# Patient Record
Sex: FEMALE | Race: OTHER | Employment: PART TIME | URBAN - METROPOLITAN AREA
[De-identification: names, ages, dates, MRNs, and addresses within clinical notes are randomized per-mention and may not be internally consistent; named-entity substitution may affect disease eponyms.]

---

## 2018-03-15 ENCOUNTER — OFFICE VISIT (OUTPATIENT)
Dept: FAMILY MEDICINE CLINIC | Age: 63
End: 2018-03-15
Payer: COMMERCIAL

## 2018-03-15 VITALS
OXYGEN SATURATION: 97 % | RESPIRATION RATE: 22 BRPM | HEART RATE: 77 BPM | SYSTOLIC BLOOD PRESSURE: 128 MMHG | HEIGHT: 60 IN | WEIGHT: 170 LBS | DIASTOLIC BLOOD PRESSURE: 82 MMHG | TEMPERATURE: 98.4 F | BODY MASS INDEX: 33.38 KG/M2

## 2018-03-15 DIAGNOSIS — R73.9 BLOOD GLUCOSE ELEVATED: ICD-10-CM

## 2018-03-15 DIAGNOSIS — R53.83 FATIGUE, UNSPECIFIED TYPE: ICD-10-CM

## 2018-03-15 DIAGNOSIS — A08.4 VIRAL GASTROENTERITIS: Primary | ICD-10-CM

## 2018-03-15 DIAGNOSIS — R11.0 NAUSEA: ICD-10-CM

## 2018-03-15 LAB — GLUCOSE BLD-MCNC: 300 MG/DL

## 2018-03-15 PROCEDURE — G8417 CALC BMI ABV UP PARAM F/U: HCPCS | Performed by: NURSE PRACTITIONER

## 2018-03-15 PROCEDURE — 3017F COLORECTAL CA SCREEN DOC REV: CPT | Performed by: NURSE PRACTITIONER

## 2018-03-15 PROCEDURE — 3014F SCREEN MAMMO DOC REV: CPT | Performed by: NURSE PRACTITIONER

## 2018-03-15 PROCEDURE — 1036F TOBACCO NON-USER: CPT | Performed by: NURSE PRACTITIONER

## 2018-03-15 PROCEDURE — G8484 FLU IMMUNIZE NO ADMIN: HCPCS | Performed by: NURSE PRACTITIONER

## 2018-03-15 PROCEDURE — 99213 OFFICE O/P EST LOW 20 MIN: CPT | Performed by: NURSE PRACTITIONER

## 2018-03-15 PROCEDURE — G8427 DOCREV CUR MEDS BY ELIG CLIN: HCPCS | Performed by: NURSE PRACTITIONER

## 2018-03-15 PROCEDURE — 82962 GLUCOSE BLOOD TEST: CPT | Performed by: NURSE PRACTITIONER

## 2018-03-15 RX ORDER — ONDANSETRON 4 MG/1
4 TABLET, FILM COATED ORAL EVERY 8 HOURS PRN
Qty: 21 TABLET | Refills: 0 | Status: SHIPPED | OUTPATIENT
Start: 2018-03-15 | End: 2018-03-22

## 2018-03-15 RX ORDER — DILTIAZEM HYDROCHLORIDE 240 MG/1
CAPSULE, EXTENDED RELEASE ORAL
Refills: 3 | COMMUNITY
Start: 2018-03-13 | End: 2018-05-15 | Stop reason: SDUPTHER

## 2018-03-16 ENCOUNTER — OFFICE VISIT (OUTPATIENT)
Dept: INTERNAL MEDICINE CLINIC | Age: 63
End: 2018-03-16
Payer: COMMERCIAL

## 2018-03-16 VITALS
HEART RATE: 72 BPM | WEIGHT: 169 LBS | DIASTOLIC BLOOD PRESSURE: 76 MMHG | HEIGHT: 60 IN | TEMPERATURE: 97.7 F | SYSTOLIC BLOOD PRESSURE: 118 MMHG | RESPIRATION RATE: 12 BRPM | BODY MASS INDEX: 33.18 KG/M2

## 2018-03-16 DIAGNOSIS — I10 ESSENTIAL HYPERTENSION: Primary | ICD-10-CM

## 2018-03-16 DIAGNOSIS — R73.9 HYPERGLYCEMIA: ICD-10-CM

## 2018-03-16 DIAGNOSIS — R07.89 CHEST PRESSURE: ICD-10-CM

## 2018-03-16 DIAGNOSIS — R53.83 FATIGUE, UNSPECIFIED TYPE: ICD-10-CM

## 2018-03-16 DIAGNOSIS — Z12.39 ENCOUNTER FOR BREAST CANCER SCREENING OTHER THAN MAMMOGRAM: ICD-10-CM

## 2018-03-16 DIAGNOSIS — E78.5 HYPERLIPIDEMIA, UNSPECIFIED HYPERLIPIDEMIA TYPE: ICD-10-CM

## 2018-03-16 DIAGNOSIS — Z12.11 SCREEN FOR COLON CANCER: ICD-10-CM

## 2018-03-16 LAB
ALBUMIN SERPL-MCNC: 4.6 G/DL (ref 3.9–4.9)
ALP BLD-CCNC: 95 U/L (ref 40–130)
ALT SERPL-CCNC: 21 U/L (ref 0–33)
ANION GAP SERPL CALCULATED.3IONS-SCNC: 13 MEQ/L (ref 7–13)
AST SERPL-CCNC: 21 U/L (ref 0–35)
BASOPHILS ABSOLUTE: 0.1 K/UL (ref 0–0.2)
BASOPHILS RELATIVE PERCENT: 0.8 %
BILIRUB SERPL-MCNC: 0.5 MG/DL (ref 0–1.2)
BUN BLDV-MCNC: 13 MG/DL (ref 8–23)
CALCIUM SERPL-MCNC: 9.5 MG/DL (ref 8.6–10.2)
CHLORIDE BLD-SCNC: 101 MEQ/L (ref 98–107)
CHOLESTEROL, TOTAL: 257 MG/DL (ref 0–199)
CO2: 27 MEQ/L (ref 22–29)
CREAT SERPL-MCNC: 0.34 MG/DL (ref 0.5–0.9)
EOSINOPHILS ABSOLUTE: 0.1 K/UL (ref 0–0.7)
EOSINOPHILS RELATIVE PERCENT: 1.1 %
GFR AFRICAN AMERICAN: >60
GFR NON-AFRICAN AMERICAN: >60
GLOBULIN: 2.5 G/DL (ref 2.3–3.5)
GLUCOSE BLD-MCNC: 121 MG/DL (ref 74–109)
GLUCOSE BLD-MCNC: 125 MG/DL
HBA1C MFR BLD: 8.2 % (ref 4.8–5.9)
HCT VFR BLD CALC: 45.2 % (ref 37–47)
HDLC SERPL-MCNC: 68 MG/DL (ref 40–59)
HEMOGLOBIN: 15.3 G/DL (ref 12–16)
LDL CHOLESTEROL CALCULATED: 153 MG/DL (ref 0–129)
LYMPHOCYTES ABSOLUTE: 2.7 K/UL (ref 1–4.8)
LYMPHOCYTES RELATIVE PERCENT: 31.5 %
MCH RBC QN AUTO: 31.9 PG (ref 27–31.3)
MCHC RBC AUTO-ENTMCNC: 33.9 % (ref 33–37)
MCV RBC AUTO: 94.2 FL (ref 82–100)
MONOCYTES ABSOLUTE: 0.5 K/UL (ref 0.2–0.8)
MONOCYTES RELATIVE PERCENT: 5.9 %
NEUTROPHILS ABSOLUTE: 5.2 K/UL (ref 1.4–6.5)
NEUTROPHILS RELATIVE PERCENT: 60.7 %
PDW BLD-RTO: 13 % (ref 11.5–14.5)
PLATELET # BLD: 235 K/UL (ref 130–400)
POTASSIUM SERPL-SCNC: 4.2 MEQ/L (ref 3.5–5.1)
RBC # BLD: 4.8 M/UL (ref 4.2–5.4)
SODIUM BLD-SCNC: 141 MEQ/L (ref 132–144)
T4 FREE: 1.24 NG/DL (ref 0.93–1.7)
TOTAL PROTEIN: 7.1 G/DL (ref 6.4–8.1)
TRIGL SERPL-MCNC: 182 MG/DL (ref 0–200)
TSH SERPL DL<=0.05 MIU/L-ACNC: 1.06 UIU/ML (ref 0.27–4.2)
WBC # BLD: 8.6 K/UL (ref 4.8–10.8)

## 2018-03-16 PROCEDURE — G8427 DOCREV CUR MEDS BY ELIG CLIN: HCPCS | Performed by: NURSE PRACTITIONER

## 2018-03-16 PROCEDURE — G8417 CALC BMI ABV UP PARAM F/U: HCPCS | Performed by: NURSE PRACTITIONER

## 2018-03-16 PROCEDURE — 3017F COLORECTAL CA SCREEN DOC REV: CPT | Performed by: NURSE PRACTITIONER

## 2018-03-16 PROCEDURE — 1036F TOBACCO NON-USER: CPT | Performed by: NURSE PRACTITIONER

## 2018-03-16 PROCEDURE — 82962 GLUCOSE BLOOD TEST: CPT | Performed by: NURSE PRACTITIONER

## 2018-03-16 PROCEDURE — 36415 COLL VENOUS BLD VENIPUNCTURE: CPT | Performed by: NURSE PRACTITIONER

## 2018-03-16 PROCEDURE — 3014F SCREEN MAMMO DOC REV: CPT | Performed by: NURSE PRACTITIONER

## 2018-03-16 PROCEDURE — 99214 OFFICE O/P EST MOD 30 MIN: CPT | Performed by: NURSE PRACTITIONER

## 2018-03-16 PROCEDURE — 93000 ELECTROCARDIOGRAM COMPLETE: CPT | Performed by: NURSE PRACTITIONER

## 2018-03-16 PROCEDURE — G8484 FLU IMMUNIZE NO ADMIN: HCPCS | Performed by: NURSE PRACTITIONER

## 2018-03-16 RX ORDER — CELECOXIB 100 MG/1
100 CAPSULE ORAL 2 TIMES DAILY
Qty: 60 CAPSULE | Refills: 0 | Status: SHIPPED | OUTPATIENT
Start: 2018-03-16 | End: 2018-04-24 | Stop reason: SDUPTHER

## 2018-03-16 ASSESSMENT — PATIENT HEALTH QUESTIONNAIRE - PHQ9
1. LITTLE INTEREST OR PLEASURE IN DOING THINGS: 0
2. FEELING DOWN, DEPRESSED OR HOPELESS: 0
SUM OF ALL RESPONSES TO PHQ9 QUESTIONS 1 & 2: 0
SUM OF ALL RESPONSES TO PHQ QUESTIONS 1-9: 0

## 2018-03-16 ASSESSMENT — ENCOUNTER SYMPTOMS
ANAL BLEEDING: 0
CONSTIPATION: 0
VOMITING: 0
FLATUS: 0
VISUAL CHANGE: 0
DIARRHEA: 1
WHEEZING: 0
CHANGE IN BOWEL HABIT: 1
SHORTNESS OF BREATH: 0
ABDOMINAL PAIN: 0
ABDOMINAL DISTENTION: 0
SWOLLEN GLANDS: 0
COUGH: 0
CHEST TIGHTNESS: 0
SORE THROAT: 0
BLOOD IN STOOL: 0
NAUSEA: 1
BLOATING: 1

## 2018-03-16 NOTE — PROGRESS NOTES
 nitroGLYCERIN (NITROSTAT) 0.4 MG SL tablet Place 0.4 mg under the tongue every 5 minutes as needed.  acetaminophen (TYLENOL ARTHRITIS PAIN) 650 MG CR tablet Take 650 mg by mouth every 8 hours as needed.  Misc. Devices (CRUTCHES-ALUMINUM) MISC 1 each by Does not apply route daily. 1 each 0    hydrochlorothiazide (MICROZIDE) 12.5 MG capsule Take 12.5 mg by mouth daily.  ranolazine (RANEXA) 1000 MG SR tablet Take 1,000 mg by mouth daily.  albuterol (PROAIR HFA) 108 (90 BASE) MCG/ACT inhaler Inhale 2 puffs into the lungs every 6 hours as needed for Wheezing. 1 Inhaler 3     No current facility-administered medications on file prior to visit. Past Surgical History:   Procedure Laterality Date     SECTION      KIDNEY STONE SURGERY       Family History   Problem Relation Age of Onset    Heart Disease Mother     Diabetes Mother     Heart Attack Father     Diabetes Sister     Diabetes Brother     Diabetes Sister     Heart Disease Sister      Social History     Social History    Marital status:      Spouse name: N/A    Number of children: N/A    Years of education: N/A     Occupational History    Not on file. Social History Main Topics    Smoking status: Never Smoker    Smokeless tobacco: Never Used    Alcohol use Not on file    Drug use: Unknown    Sexual activity: Not on file     Other Topics Concern    Not on file     Social History Narrative    No narrative on file     Allergies:  Patient has no known allergies. Review of Systems   Constitutional: Positive for chills, diaphoresis (on and off) and fatigue. Negative for fever, unexpected weight change and weight loss. HENT: Negative for congestion and sore throat. Respiratory: Negative for cough, chest tightness, shortness of breath and wheezing. Cardiovascular: Positive for chest pain (patient states that she has occasional \"discomfort\" in her chest that comes and goes.  No pain

## 2018-03-17 LAB
FOLATE: >20 NG/ML (ref 7.3–26.1)
VITAMIN B-12: 900 PG/ML (ref 232–1245)
VITAMIN D 25-HYDROXY: 16.6 NG/ML (ref 30–100)

## 2018-03-19 ENCOUNTER — TELEPHONE (OUTPATIENT)
Dept: INTERNAL MEDICINE CLINIC | Age: 63
End: 2018-03-19

## 2018-03-19 RX ORDER — ERGOCALCIFEROL 1.25 MG/1
50000 CAPSULE ORAL WEEKLY
Qty: 4 CAPSULE | Refills: 3 | Status: SHIPPED | OUTPATIENT
Start: 2018-03-19 | End: 2018-07-11 | Stop reason: SDUPTHER

## 2018-03-19 RX ORDER — ATORVASTATIN CALCIUM 10 MG/1
10 TABLET, FILM COATED ORAL DAILY
Qty: 30 TABLET | Refills: 3 | Status: SHIPPED | OUTPATIENT
Start: 2018-03-19 | End: 2018-04-09

## 2018-03-20 ENCOUNTER — TELEPHONE (OUTPATIENT)
Dept: INTERNAL MEDICINE CLINIC | Age: 63
End: 2018-03-20

## 2018-03-30 ASSESSMENT — ENCOUNTER SYMPTOMS
EYES NEGATIVE: 1
NAUSEA: 1
BLURRED VISION: 0
ABDOMINAL PAIN: 0
SHORTNESS OF BREATH: 0
DIARRHEA: 1
VOMITING: 0
COUGH: 0
CONSTIPATION: 0
CHEST TIGHTNESS: 1
WHEEZING: 0

## 2018-04-09 ENCOUNTER — OFFICE VISIT (OUTPATIENT)
Dept: INTERNAL MEDICINE CLINIC | Age: 63
End: 2018-04-09
Payer: COMMERCIAL

## 2018-04-09 VITALS
HEART RATE: 73 BPM | SYSTOLIC BLOOD PRESSURE: 110 MMHG | HEIGHT: 65 IN | DIASTOLIC BLOOD PRESSURE: 70 MMHG | OXYGEN SATURATION: 97 % | BODY MASS INDEX: 27.16 KG/M2 | TEMPERATURE: 97.6 F | WEIGHT: 163 LBS

## 2018-04-09 DIAGNOSIS — N76.0 VAGINOSIS: Primary | ICD-10-CM

## 2018-04-09 PROCEDURE — 81003 URINALYSIS AUTO W/O SCOPE: CPT | Performed by: NURSE PRACTITIONER

## 2018-04-09 PROCEDURE — 99213 OFFICE O/P EST LOW 20 MIN: CPT | Performed by: NURSE PRACTITIONER

## 2018-04-09 PROCEDURE — 3017F COLORECTAL CA SCREEN DOC REV: CPT | Performed by: NURSE PRACTITIONER

## 2018-04-09 PROCEDURE — G8427 DOCREV CUR MEDS BY ELIG CLIN: HCPCS | Performed by: NURSE PRACTITIONER

## 2018-04-09 PROCEDURE — 1036F TOBACCO NON-USER: CPT | Performed by: NURSE PRACTITIONER

## 2018-04-09 PROCEDURE — 3014F SCREEN MAMMO DOC REV: CPT | Performed by: NURSE PRACTITIONER

## 2018-04-09 PROCEDURE — G8417 CALC BMI ABV UP PARAM F/U: HCPCS | Performed by: NURSE PRACTITIONER

## 2018-04-09 RX ORDER — ROSUVASTATIN CALCIUM 20 MG/1
20 TABLET, COATED ORAL DAILY
Qty: 90 TABLET | Refills: 1 | Status: SHIPPED | OUTPATIENT
Start: 2018-04-09 | End: 2019-07-09 | Stop reason: SDUPTHER

## 2018-04-09 RX ORDER — FLUCONAZOLE 150 MG/1
150 TABLET ORAL ONCE
Qty: 2 TABLET | Refills: 0 | Status: SHIPPED | OUTPATIENT
Start: 2018-04-09 | End: 2018-04-09

## 2018-04-09 RX ORDER — NYSTATIN 100000 U/G
CREAM TOPICAL
Qty: 30 G | Refills: 0 | Status: SHIPPED | OUTPATIENT
Start: 2018-04-09 | End: 2018-08-28 | Stop reason: SDUPTHER

## 2018-04-09 ASSESSMENT — ENCOUNTER SYMPTOMS
DIARRHEA: 0
SHORTNESS OF BREATH: 0
SORE THROAT: 0
CONSTIPATION: 0
VOMITING: 0
NAUSEA: 0
WHEEZING: 0
COUGH: 0
ABDOMINAL PAIN: 0

## 2018-04-11 LAB — URINE CULTURE, ROUTINE: NORMAL

## 2018-04-12 LAB
BILIRUBIN, POC: NORMAL
BLOOD URINE, POC: NORMAL
CLARITY, POC: NORMAL
COLOR, POC: NORMAL
GLUCOSE URINE, POC: NORMAL
KETONES, POC: NORMAL
LEUKOCYTE EST, POC: NORMAL
NITRITE, POC: NORMAL
PH, POC: 5.5
PROTEIN, POC: NORMAL
SPECIFIC GRAVITY, POC: 1.03
UROBILINOGEN, POC: NORMAL

## 2018-04-18 ENCOUNTER — OFFICE VISIT (OUTPATIENT)
Dept: FAMILY MEDICINE CLINIC | Age: 63
End: 2018-04-18
Payer: COMMERCIAL

## 2018-04-18 VITALS
OXYGEN SATURATION: 98 % | BODY MASS INDEX: 31.84 KG/M2 | HEART RATE: 104 BPM | WEIGHT: 162.2 LBS | TEMPERATURE: 100.2 F | SYSTOLIC BLOOD PRESSURE: 120 MMHG | RESPIRATION RATE: 22 BRPM | HEIGHT: 60 IN | DIASTOLIC BLOOD PRESSURE: 78 MMHG

## 2018-04-18 DIAGNOSIS — H66.001 ACUTE SUPPURATIVE OTITIS MEDIA OF RIGHT EAR WITHOUT SPONTANEOUS RUPTURE OF TYMPANIC MEMBRANE, RECURRENCE NOT SPECIFIED: ICD-10-CM

## 2018-04-18 DIAGNOSIS — J40 BRONCHITIS: Primary | ICD-10-CM

## 2018-04-18 DIAGNOSIS — R50.9 FEVER, UNSPECIFIED FEVER CAUSE: ICD-10-CM

## 2018-04-18 LAB
INFLUENZA A ANTIBODY: NORMAL
INFLUENZA B ANTIBODY: NORMAL

## 2018-04-18 PROCEDURE — G8417 CALC BMI ABV UP PARAM F/U: HCPCS | Performed by: NURSE PRACTITIONER

## 2018-04-18 PROCEDURE — 1036F TOBACCO NON-USER: CPT | Performed by: NURSE PRACTITIONER

## 2018-04-18 PROCEDURE — 3014F SCREEN MAMMO DOC REV: CPT | Performed by: NURSE PRACTITIONER

## 2018-04-18 PROCEDURE — 99213 OFFICE O/P EST LOW 20 MIN: CPT | Performed by: NURSE PRACTITIONER

## 2018-04-18 PROCEDURE — G8427 DOCREV CUR MEDS BY ELIG CLIN: HCPCS | Performed by: NURSE PRACTITIONER

## 2018-04-18 PROCEDURE — 3017F COLORECTAL CA SCREEN DOC REV: CPT | Performed by: NURSE PRACTITIONER

## 2018-04-18 PROCEDURE — 87804 INFLUENZA ASSAY W/OPTIC: CPT | Performed by: NURSE PRACTITIONER

## 2018-04-18 RX ORDER — METHYLPREDNISOLONE 4 MG/1
TABLET ORAL
Qty: 1 KIT | Refills: 0 | Status: SHIPPED | OUTPATIENT
Start: 2018-04-18 | End: 2019-07-09

## 2018-04-18 RX ORDER — AMOXICILLIN AND CLAVULANATE POTASSIUM 875; 125 MG/1; MG/1
1 TABLET, FILM COATED ORAL 2 TIMES DAILY
Qty: 20 TABLET | Refills: 0 | Status: SHIPPED | OUTPATIENT
Start: 2018-04-18 | End: 2018-04-28

## 2018-04-18 RX ORDER — DEXTROMETHORPHAN HYDROBROMIDE AND PROMETHAZINE HYDROCHLORIDE 15; 6.25 MG/5ML; MG/5ML
5 SYRUP ORAL 4 TIMES DAILY PRN
Qty: 150 ML | Refills: 0 | Status: SHIPPED | OUTPATIENT
Start: 2018-04-18 | End: 2018-04-25

## 2018-04-19 ASSESSMENT — ENCOUNTER SYMPTOMS
COUGH: 1
VOMITING: 0
ABDOMINAL PAIN: 0
ANAL BLEEDING: 0
DIARRHEA: 1
SWOLLEN GLANDS: 0
SHORTNESS OF BREATH: 1
BLOOD IN STOOL: 0
ABDOMINAL DISTENTION: 0
WHEEZING: 1
RHINORRHEA: 0
CONSTIPATION: 0
SINUS PAIN: 0
NAUSEA: 0
CHEST TIGHTNESS: 0
SORE THROAT: 1

## 2018-04-24 RX ORDER — CELECOXIB 100 MG/1
CAPSULE ORAL
Qty: 60 CAPSULE | Refills: 0 | Status: SHIPPED | OUTPATIENT
Start: 2018-04-24 | End: 2018-06-14 | Stop reason: SDUPTHER

## 2018-05-15 RX ORDER — DILTIAZEM HYDROCHLORIDE 240 MG/1
CAPSULE, EXTENDED RELEASE ORAL
Qty: 30 CAPSULE | Refills: 2 | Status: SHIPPED | OUTPATIENT
Start: 2018-05-15 | End: 2018-08-20 | Stop reason: SDUPTHER

## 2018-06-14 RX ORDER — CELECOXIB 100 MG/1
CAPSULE ORAL
Qty: 60 CAPSULE | Refills: 3 | Status: SHIPPED | OUTPATIENT
Start: 2018-06-14 | End: 2019-07-09

## 2018-07-11 RX ORDER — ERGOCALCIFEROL 1.25 MG/1
CAPSULE ORAL
Qty: 4 CAPSULE | Refills: 2 | Status: SHIPPED | OUTPATIENT
Start: 2018-07-11 | End: 2018-10-24 | Stop reason: SDUPTHER

## 2018-07-27 ENCOUNTER — OFFICE VISIT (OUTPATIENT)
Dept: FAMILY MEDICINE CLINIC | Age: 63
End: 2018-07-27
Payer: COMMERCIAL

## 2018-07-27 VITALS
RESPIRATION RATE: 14 BRPM | DIASTOLIC BLOOD PRESSURE: 72 MMHG | BODY MASS INDEX: 31.41 KG/M2 | TEMPERATURE: 98.9 F | HEART RATE: 79 BPM | HEIGHT: 60 IN | WEIGHT: 160 LBS | SYSTOLIC BLOOD PRESSURE: 128 MMHG | OXYGEN SATURATION: 97 %

## 2018-07-27 DIAGNOSIS — R39.9 UTI SYMPTOMS: ICD-10-CM

## 2018-07-27 DIAGNOSIS — R39.9 UTI SYMPTOMS: Primary | ICD-10-CM

## 2018-07-27 LAB
BILIRUBIN, POC: ABNORMAL
BLOOD URINE, POC: ABNORMAL
CLARITY, POC: ABNORMAL
COLOR, POC: YELLOW
GLUCOSE URINE, POC: ABNORMAL
KETONES, POC: ABNORMAL
LEUKOCYTE EST, POC: ABNORMAL
NITRITE, POC: ABNORMAL
PH, POC: 6
PROTEIN, POC: ABNORMAL
SPECIFIC GRAVITY, POC: 1.03
UROBILINOGEN, POC: ABNORMAL

## 2018-07-27 PROCEDURE — 99213 OFFICE O/P EST LOW 20 MIN: CPT | Performed by: NURSE PRACTITIONER

## 2018-07-27 PROCEDURE — G8427 DOCREV CUR MEDS BY ELIG CLIN: HCPCS | Performed by: NURSE PRACTITIONER

## 2018-07-27 PROCEDURE — G8417 CALC BMI ABV UP PARAM F/U: HCPCS | Performed by: NURSE PRACTITIONER

## 2018-07-27 PROCEDURE — 3017F COLORECTAL CA SCREEN DOC REV: CPT | Performed by: NURSE PRACTITIONER

## 2018-07-27 PROCEDURE — 1036F TOBACCO NON-USER: CPT | Performed by: NURSE PRACTITIONER

## 2018-07-27 PROCEDURE — 81003 URINALYSIS AUTO W/O SCOPE: CPT | Performed by: NURSE PRACTITIONER

## 2018-07-27 RX ORDER — NYSTATIN 100000 U/G
CREAM TOPICAL
Qty: 30 G | Refills: 0 | Status: CANCELLED | OUTPATIENT
Start: 2018-07-27

## 2018-07-27 RX ORDER — NITROFURANTOIN 25; 75 MG/1; MG/1
100 CAPSULE ORAL 2 TIMES DAILY
Qty: 14 CAPSULE | Refills: 0 | Status: SHIPPED | OUTPATIENT
Start: 2018-07-27 | End: 2018-08-03

## 2018-07-27 RX ORDER — PHENAZOPYRIDINE HYDROCHLORIDE 200 MG/1
200 TABLET, FILM COATED ORAL 3 TIMES DAILY PRN
Qty: 15 TABLET | Refills: 0 | Status: SHIPPED | OUTPATIENT
Start: 2018-07-27 | End: 2018-08-01

## 2018-07-27 ASSESSMENT — ENCOUNTER SYMPTOMS
DIARRHEA: 0
SHORTNESS OF BREATH: 0
NAUSEA: 1
SINUS PRESSURE: 0
COUGH: 0
EYE PAIN: 0
TROUBLE SWALLOWING: 0
ABDOMINAL PAIN: 0
EYE DISCHARGE: 0
VOMITING: 0
EYE ITCHING: 0
EYE REDNESS: 0
PHOTOPHOBIA: 0
RHINORRHEA: 0

## 2018-07-27 NOTE — PROGRESS NOTES
capsule TAKE 1 CAPSULE BY MOUTH ONCE PER WEEK 4 capsule 2    celecoxib (CELEBREX) 100 MG capsule TAKE ONE CAPSULE BY MOUTH TWO TIMES A DAY 60 capsule 3    diltiazem (TIAZAC) 240 MG extended release capsule TAKE ONE CAPSULE BY MOUTH DAILY IN THE MORNING 30 capsule 2    methylPREDNISolone (MEDROL, CARIN,) 4 MG tablet Take by mouth. 1 kit 0    nystatin (MYCOSTATIN) 733201 UNIT/GM cream Apply topically 2 times daily. 30 g 0    rosuvastatin (CRESTOR) 20 MG tablet Take 1 tablet by mouth daily 90 tablet 1    aspirin 81 MG tablet Take 81 mg by mouth daily.  diltiazem (CARDIZEM CD) 180 MG ER capsule Take 240 mg by mouth daily       nitroGLYCERIN (NITROSTAT) 0.4 MG SL tablet Place 0.4 mg under the tongue every 5 minutes as needed.  acetaminophen (TYLENOL ARTHRITIS PAIN) 650 MG CR tablet Take 650 mg by mouth every 8 hours as needed. No current facility-administered medications for this visit. Review of Systems   Constitutional: Positive for chills, diaphoresis and fatigue. Negative for activity change, appetite change and fever. HENT: Negative for congestion, ear discharge, ear pain, postnasal drip, rhinorrhea, sinus pressure and trouble swallowing. Eyes: Negative for photophobia, pain, discharge, redness and itching. Respiratory: Negative for cough and shortness of breath. Cardiovascular: Negative for chest pain. Gastrointestinal: Positive for nausea. Negative for abdominal pain, diarrhea and vomiting. Genitourinary: Positive for decreased urine volume, flank pain (More on the left than the right), frequency and urgency. Negative for dyspareunia, dysuria, genital sores, hematuria, hesitancy, menstrual problem, pelvic pain, vaginal bleeding, vaginal discharge and vaginal pain. Vaginal itching/irritation   Musculoskeletal: Negative for arthralgias and myalgias. Skin: Negative for rash. Allergic/Immunologic: Negative for environmental allergies.    Hematological: Negative

## 2018-07-29 LAB — URINE CULTURE, ROUTINE: NORMAL

## 2018-08-20 RX ORDER — DILTIAZEM HYDROCHLORIDE 240 MG/1
CAPSULE, EXTENDED RELEASE ORAL
Qty: 30 CAPSULE | Refills: 1 | Status: SHIPPED | OUTPATIENT
Start: 2018-08-20 | End: 2018-10-30 | Stop reason: SDUPTHER

## 2018-08-20 NOTE — TELEPHONE ENCOUNTER
requesting medication refill. Rx requested:  Requested Prescriptions     Pending Prescriptions Disp Refills    diltiazem (TIAZAC) 240 MG extended release capsule [Pharmacy Med Name: DilTIAZem HCl ER Beads Oral Capsule Extended Release 24 Hour 240 MG] 30 capsule 1     Sig: TAKE ONE CAPSULE BY MOUTH EVERY DAY IN THE MORNING       Last Office Visit:   4/9/2018      Next Visit Date:  No future appointments.

## 2018-08-28 ENCOUNTER — OFFICE VISIT (OUTPATIENT)
Dept: INTERNAL MEDICINE CLINIC | Age: 63
End: 2018-08-28
Payer: COMMERCIAL

## 2018-08-28 VITALS
TEMPERATURE: 98.4 F | HEART RATE: 93 BPM | RESPIRATION RATE: 20 BRPM | HEIGHT: 65 IN | SYSTOLIC BLOOD PRESSURE: 128 MMHG | WEIGHT: 157.8 LBS | DIASTOLIC BLOOD PRESSURE: 80 MMHG | BODY MASS INDEX: 26.29 KG/M2 | OXYGEN SATURATION: 97 %

## 2018-08-28 DIAGNOSIS — E78.5 HYPERLIPIDEMIA, UNSPECIFIED HYPERLIPIDEMIA TYPE: ICD-10-CM

## 2018-08-28 DIAGNOSIS — E55.9 VITAMIN D DEFICIENCY: ICD-10-CM

## 2018-08-28 DIAGNOSIS — R10.11 RIGHT UPPER QUADRANT ABDOMINAL PAIN: Primary | ICD-10-CM

## 2018-08-28 DIAGNOSIS — Z11.4 ENCOUNTER FOR SCREENING FOR HIV: ICD-10-CM

## 2018-08-28 DIAGNOSIS — Z12.11 SCREENING FOR COLON CANCER: ICD-10-CM

## 2018-08-28 DIAGNOSIS — Z11.59 NEED FOR HEPATITIS C SCREENING TEST: ICD-10-CM

## 2018-08-28 DIAGNOSIS — N20.0 RECURRENT KIDNEY STONES: ICD-10-CM

## 2018-08-28 DIAGNOSIS — I10 ESSENTIAL HYPERTENSION: ICD-10-CM

## 2018-08-28 DIAGNOSIS — B37.31 VAGINAL YEAST INFECTION: ICD-10-CM

## 2018-08-28 DIAGNOSIS — Z12.39 BREAST CANCER SCREENING: ICD-10-CM

## 2018-08-28 DIAGNOSIS — E11.9 TYPE 2 DIABETES MELLITUS WITHOUT COMPLICATION, WITHOUT LONG-TERM CURRENT USE OF INSULIN (HCC): ICD-10-CM

## 2018-08-28 DIAGNOSIS — K21.9 GASTROESOPHAGEAL REFLUX DISEASE WITHOUT ESOPHAGITIS: ICD-10-CM

## 2018-08-28 PROCEDURE — 2022F DILAT RTA XM EVC RTNOPTHY: CPT | Performed by: NURSE PRACTITIONER

## 2018-08-28 PROCEDURE — 3017F COLORECTAL CA SCREEN DOC REV: CPT | Performed by: NURSE PRACTITIONER

## 2018-08-28 PROCEDURE — 99214 OFFICE O/P EST MOD 30 MIN: CPT | Performed by: NURSE PRACTITIONER

## 2018-08-28 PROCEDURE — G8427 DOCREV CUR MEDS BY ELIG CLIN: HCPCS | Performed by: NURSE PRACTITIONER

## 2018-08-28 PROCEDURE — G8417 CALC BMI ABV UP PARAM F/U: HCPCS | Performed by: NURSE PRACTITIONER

## 2018-08-28 PROCEDURE — 1036F TOBACCO NON-USER: CPT | Performed by: NURSE PRACTITIONER

## 2018-08-28 PROCEDURE — 3045F PR MOST RECENT HEMOGLOBIN A1C LEVEL 7.0-9.0%: CPT | Performed by: NURSE PRACTITIONER

## 2018-08-28 RX ORDER — NYSTATIN 100000 U/G
CREAM TOPICAL
Qty: 30 G | Refills: 1 | Status: SHIPPED | OUTPATIENT
Start: 2018-08-28 | End: 2019-07-09 | Stop reason: SDUPTHER

## 2018-08-28 RX ORDER — OMEPRAZOLE 40 MG/1
40 CAPSULE, DELAYED RELEASE ORAL DAILY
Qty: 30 CAPSULE | Refills: 3 | Status: SHIPPED | OUTPATIENT
Start: 2018-08-28 | End: 2018-10-24

## 2018-08-28 RX ORDER — FLUCONAZOLE 150 MG/1
150 TABLET ORAL ONCE
Qty: 2 TABLET | Refills: 0 | Status: SHIPPED | OUTPATIENT
Start: 2018-08-28 | End: 2018-08-28

## 2018-08-28 ASSESSMENT — ENCOUNTER SYMPTOMS
FLATUS: 0
WHEEZING: 0
CONSTIPATION: 0
EYES NEGATIVE: 1
NAUSEA: 1
DIARRHEA: 1
SINUS PRESSURE: 0
ABDOMINAL PAIN: 1
CHEST TIGHTNESS: 0
COUGH: 0
SHORTNESS OF BREATH: 0
HEMATOCHEZIA: 0
BELCHING: 0
ALLERGIC/IMMUNOLOGIC NEGATIVE: 1
SORE THROAT: 0
TROUBLE SWALLOWING: 0
BACK PAIN: 0
VOMITING: 0

## 2018-08-28 NOTE — PROGRESS NOTES
Subjective:      Patient ID: Raimundo Burnett is a 65 y.o. female who presents today for:  Chief Complaint   Patient presents with    Abdominal Pain     nausea, pain in middle of upper stomach and diarrhea for 5 days straight began 8/20/18    Dizziness     began with the abdominal pain and it has been off and on over the past week    Vaginal Itching     began at the same time as the UTI - requesting refill of nystatin cream       Abdominal Pain   This is a new problem. The current episode started in the past 7 days. The onset quality is gradual. The problem has been waxing and waning. The pain is located in the RUQ and right flank. The pain is moderate. The quality of the pain is aching and cramping. The abdominal pain does not radiate. Associated symptoms include diarrhea and nausea. Pertinent negatives include no anorexia, arthralgias, belching, constipation, dysuria, fever, flatus, frequency, headaches, hematochezia, hematuria, melena, myalgias, vomiting or weight loss. The pain is relieved by eating. She has tried nothing for the symptoms. Vaginal Itching   The patient's primary symptoms include genital itching. The patient's pertinent negatives include no genital lesions, genital odor, genital rash, missed menses, pelvic pain, vaginal bleeding or vaginal discharge. This is a new problem. The current episode started in the past 7 days. The problem occurs daily. The problem has been waxing and waning. The patient is experiencing no pain. She is not pregnant. Associated symptoms include abdominal pain, diarrhea, flank pain, nausea and rash. Pertinent negatives include no anorexia, back pain, chills, constipation, discolored urine, dysuria, fever, frequency, headaches, hematuria, joint pain, joint swelling, painful intercourse, sore throat, urgency or vomiting. The symptoms are aggravated by tactile pressure. She has tried nothing for the symptoms.        Past Medical History:   Diagnosis Date    Atrial heart sounds. Pulmonary/Chest: Effort normal and breath sounds normal. No respiratory distress. She has no wheezes. Abdominal: Soft. Normal appearance and bowel sounds are normal. She exhibits no distension. There is tenderness in the right upper quadrant. There is no rigidity, no rebound and no guarding. Musculoskeletal: Normal range of motion. She exhibits no edema or tenderness. Lymphadenopathy:     She has no cervical adenopathy. Neurological: She is alert and oriented to person, place, and time. No cranial nerve deficit. Coordination normal.   Skin: Skin is warm. She is not diaphoretic. Psychiatric: She has a normal mood and affect. Her behavior is normal.       Assessment & Plan:      Diagnosis Orders   1. Right upper quadrant abdominal pain  US ABDOMEN COMPLETE   2. Gastroesophageal reflux disease without esophagitis  omeprazole (PRILOSEC) 40 MG delayed release capsule   3. Recurrent kidney stones  US ABDOMEN COMPLETE    Referral To Nephrology - ([facility]) Luiza Olsen DO   4. Type 2 diabetes mellitus without complication, without long-term current use of insulin ([facility])  POCT glycosylated hemoglobin (Hb A1C)    blood glucose monitor kit and supplies    CBC Auto Differential    Comprehensive Metabolic Panel    Lipid Panel    Microalbumin / Creatinine Urine Ratio   5. Essential hypertension  CBC Auto Differential    Comprehensive Metabolic Panel    Lipid Panel   6. Hyperlipidemia, unspecified hyperlipidemia type  CBC Auto Differential    Comprehensive Metabolic Panel    Lipid Panel   7. Vaginal yeast infection  fluconazole (DIFLUCAN) 150 MG tablet    nystatin (MYCOSTATIN) 612259 UNIT/GM cream   8. Vitamin D deficiency  Vitamin D 25 Hydroxy   9. Screening for colon cancer  POCT Fecal Immunochemical Test (Fit)   10. Breast cancer screening  DREW DIGITAL SCREEN W CAD BILATERAL   11. Encounter for screening for HIV  Hiv-1,-2 W/Reflex To Hiv-1 Western Blot   12.  Need for hepatitis C screening test  Hepatitis C Antibody     Orders Placed This Encounter   Procedures    DREW DIGITAL SCREEN W CAD BILATERAL     Standing Status:   Future     Standing Expiration Date:   10/28/2019     Order Specific Question:   Reason for exam:     Answer:   screen    US ABDOMEN COMPLETE     Standing Status:   Future     Standing Expiration Date:   8/28/2019     Order Specific Question:   Reason for exam:     Answer:   abdominal pain    CBC Auto Differential     Standing Status:   Future     Standing Expiration Date:   8/28/2019    Comprehensive Metabolic Panel     Standing Status:   Future     Standing Expiration Date:   8/28/2019    Lipid Panel     Standing Status:   Future     Standing Expiration Date:   8/28/2019     Order Specific Question:   Is Patient Fasting?/# of Hours     Answer:   8    Microalbumin / Creatinine Urine Ratio     Standing Status:   Future     Standing Expiration Date:   8/28/2019    Vitamin D 25 Hydroxy     Standing Status:   Future     Standing Expiration Date:   8/28/2019    Hiv-1,-2 W/Reflex To Hiv-1 Western Blot     Standing Status:   Future     Standing Expiration Date:   8/28/2019    Hepatitis C Antibody     Standing Status:   Future     Standing Expiration Date:   8/28/2019    Referral To Nephrology - ([facility]) Pedrito Maldonado DO     Referral Priority:   Routine     Referral Type:   Eval and Treat     Referral Reason:   Specialty Services Required     Referred to Provider:   Brown Ramsey DO     Requested Specialty:   Nephrology     Number of Visits Requested:   1    POCT glycosylated hemoglobin (Hb A1C)    POCT Fecal Immunochemical Test (Fit)     Standing Status:   Future     Standing Expiration Date:   8/28/2019     Orders Placed This Encounter   Medications    omeprazole (PRILOSEC) 40 MG delayed release capsule     Sig: Take 1 capsule by mouth daily     Dispense:  30 capsule     Refill:  3    blood glucose monitor kit and supplies     Sig: Test 2 times a day & as needed for symptoms of irregular blood glucose. Lancets and test strips to be included     Dispense:  1 kit     Refill:  0    fluconazole (DIFLUCAN) 150 MG tablet     Sig: Take 1 tablet by mouth once for 1 dose Repeat done in 72 hours if needed     Dispense:  2 tablet     Refill:  0    nystatin (MYCOSTATIN) 768869 UNIT/GM cream     Sig: Apply topically 2 times daily. Dispense:  30 g     Refill:  1     Medications Discontinued During This Encounter   Medication Reason    nystatin (MYCOSTATIN) 569229 UNIT/GM cream REORDER     Return in about 3 months (around 11/28/2018). Reviewed with the patient: current clinical status, medications, activities and diet. Side effects, adverse effects of the medication prescribed today, as well as treatment plan/ rationale and result expectations have been discussed with the patient who expresses understanding and desires to proceed. Pt instructions reviewed and given to patient.     Close follow up to evaluate treatment results and for coordination of care. I have reviewed the patient's medical history in detail and updated the computerized patient record.     Evan Serrano, APRN - CNP

## 2018-08-31 DIAGNOSIS — I10 ESSENTIAL HYPERTENSION: ICD-10-CM

## 2018-08-31 DIAGNOSIS — E55.9 VITAMIN D DEFICIENCY: ICD-10-CM

## 2018-08-31 DIAGNOSIS — E78.5 HYPERLIPIDEMIA, UNSPECIFIED HYPERLIPIDEMIA TYPE: ICD-10-CM

## 2018-08-31 DIAGNOSIS — E11.9 TYPE 2 DIABETES MELLITUS WITHOUT COMPLICATION, WITHOUT LONG-TERM CURRENT USE OF INSULIN (HCC): ICD-10-CM

## 2018-08-31 DIAGNOSIS — Z11.4 ENCOUNTER FOR SCREENING FOR HIV: ICD-10-CM

## 2018-08-31 DIAGNOSIS — Z11.59 NEED FOR HEPATITIS C SCREENING TEST: ICD-10-CM

## 2018-08-31 LAB
ALBUMIN SERPL-MCNC: 4.4 G/DL (ref 3.9–4.9)
ALP BLD-CCNC: 74 U/L (ref 40–130)
ALT SERPL-CCNC: 17 U/L (ref 0–33)
ANION GAP SERPL CALCULATED.3IONS-SCNC: 13 MEQ/L (ref 7–13)
AST SERPL-CCNC: 17 U/L (ref 0–35)
BASOPHILS ABSOLUTE: 0.1 K/UL (ref 0–0.2)
BASOPHILS RELATIVE PERCENT: 0.9 %
BILIRUB SERPL-MCNC: 0.4 MG/DL (ref 0–1.2)
BUN BLDV-MCNC: 15 MG/DL (ref 8–23)
CALCIUM SERPL-MCNC: 9.2 MG/DL (ref 8.6–10.2)
CHLORIDE BLD-SCNC: 102 MEQ/L (ref 98–107)
CHOLESTEROL, TOTAL: 199 MG/DL (ref 0–199)
CO2: 26 MEQ/L (ref 22–29)
CREAT SERPL-MCNC: 0.36 MG/DL (ref 0.5–0.9)
CREATININE URINE: 80.8 MG/DL
EOSINOPHILS ABSOLUTE: 0.4 K/UL (ref 0–0.7)
EOSINOPHILS RELATIVE PERCENT: 6.8 %
GFR AFRICAN AMERICAN: >60
GFR NON-AFRICAN AMERICAN: >60
GLOBULIN: 2.6 G/DL (ref 2.3–3.5)
GLUCOSE BLD-MCNC: 136 MG/DL (ref 74–109)
HCT VFR BLD CALC: 43.7 % (ref 37–47)
HDLC SERPL-MCNC: 72 MG/DL (ref 40–59)
HEMOGLOBIN: 15 G/DL (ref 12–16)
HEPATITIS C ANTIBODY INTERPRETATION: NORMAL
LDL CHOLESTEROL CALCULATED: 111 MG/DL (ref 0–129)
LYMPHOCYTES ABSOLUTE: 2.1 K/UL (ref 1–4.8)
LYMPHOCYTES RELATIVE PERCENT: 33.9 %
MCH RBC QN AUTO: 32.2 PG (ref 27–31.3)
MCHC RBC AUTO-ENTMCNC: 34.4 % (ref 33–37)
MCV RBC AUTO: 93.4 FL (ref 82–100)
MICROALBUMIN UR-MCNC: <1.2 MG/DL
MICROALBUMIN/CREAT UR-RTO: NORMAL MG/G (ref 0–30)
MONOCYTES ABSOLUTE: 0.5 K/UL (ref 0.2–0.8)
MONOCYTES RELATIVE PERCENT: 8.1 %
NEUTROPHILS ABSOLUTE: 3.2 K/UL (ref 1.4–6.5)
NEUTROPHILS RELATIVE PERCENT: 50.3 %
PDW BLD-RTO: 12.3 % (ref 11.5–14.5)
PLATELET # BLD: 227 K/UL (ref 130–400)
POTASSIUM SERPL-SCNC: 4.3 MEQ/L (ref 3.5–5.1)
RBC # BLD: 4.68 M/UL (ref 4.2–5.4)
SODIUM BLD-SCNC: 141 MEQ/L (ref 132–144)
TOTAL PROTEIN: 7 G/DL (ref 6.4–8.1)
TRIGL SERPL-MCNC: 81 MG/DL (ref 0–200)
VITAMIN D 25-HYDROXY: 31.5 NG/ML (ref 30–100)
WBC # BLD: 6.3 K/UL (ref 4.8–10.8)

## 2018-09-02 LAB — HIV-1 AND HIV-2 ANTIBODIES: NEGATIVE

## 2018-09-10 ENCOUNTER — HOSPITAL ENCOUNTER (EMERGENCY)
Age: 63
Discharge: HOME | End: 2018-09-10
Payer: COMMERCIAL

## 2018-09-10 ENCOUNTER — APPOINTMENT (OUTPATIENT)
Dept: GENERAL RADIOLOGY | Age: 63
End: 2018-09-10
Payer: COMMERCIAL

## 2018-09-10 VITALS
WEIGHT: 160 LBS | RESPIRATION RATE: 16 BRPM | DIASTOLIC BLOOD PRESSURE: 61 MMHG | HEIGHT: 60 IN | TEMPERATURE: 97.6 F | HEART RATE: 67 BPM | BODY MASS INDEX: 31.41 KG/M2 | OXYGEN SATURATION: 98 % | SYSTOLIC BLOOD PRESSURE: 108 MMHG

## 2018-09-10 DIAGNOSIS — S76.311A STRAIN OF RIGHT HAMSTRING: Primary | ICD-10-CM

## 2018-09-10 LAB
ANION GAP SERPL CALCULATED.3IONS-SCNC: 16 MEQ/L (ref 7–13)
BASOPHILS ABSOLUTE: 0.1 K/UL (ref 0–0.2)
BASOPHILS RELATIVE PERCENT: 0.9 %
BUN BLDV-MCNC: 13 MG/DL (ref 8–23)
CALCIUM SERPL-MCNC: 9 MG/DL (ref 8.6–10.2)
CHLORIDE BLD-SCNC: 101 MEQ/L (ref 98–107)
CHP ED QC CHECK: NORMAL
CO2: 19 MEQ/L (ref 22–29)
CREAT SERPL-MCNC: 0.56 MG/DL (ref 0.5–0.9)
EOSINOPHILS ABSOLUTE: 0.2 K/UL (ref 0–0.7)
EOSINOPHILS RELATIVE PERCENT: 2 %
GFR AFRICAN AMERICAN: >60
GFR NON-AFRICAN AMERICAN: >60
GLUCOSE BLD-MCNC: 224 MG/DL (ref 74–109)
GLUCOSE BLD-MCNC: 303 MG/DL
GLUCOSE BLD-MCNC: 303 MG/DL (ref 60–115)
HCT VFR BLD CALC: 37.7 % (ref 37–47)
HEMOGLOBIN: 12.9 G/DL (ref 12–16)
LYMPHOCYTES ABSOLUTE: 1.8 K/UL (ref 1–4.8)
LYMPHOCYTES RELATIVE PERCENT: 19.5 %
MCH RBC QN AUTO: 32.3 PG (ref 27–31.3)
MCHC RBC AUTO-ENTMCNC: 34.3 % (ref 33–37)
MCV RBC AUTO: 94.2 FL (ref 82–100)
MONOCYTES ABSOLUTE: 0.5 K/UL (ref 0.2–0.8)
MONOCYTES RELATIVE PERCENT: 5.8 %
NEUTROPHILS ABSOLUTE: 6.6 K/UL (ref 1.4–6.5)
NEUTROPHILS RELATIVE PERCENT: 71.8 %
PDW BLD-RTO: 12.9 % (ref 11.5–14.5)
PERFORMED ON: ABNORMAL
PLATELET # BLD: 215 K/UL (ref 130–400)
POTASSIUM SERPL-SCNC: 4.8 MEQ/L (ref 3.5–5.1)
RBC # BLD: 4.01 M/UL (ref 4.2–5.4)
REJECTED TEST: NORMAL
SODIUM BLD-SCNC: 136 MEQ/L (ref 132–144)
WBC # BLD: 9.2 K/UL (ref 4.8–10.8)

## 2018-09-10 PROCEDURE — 96374 THER/PROPH/DIAG INJ IV PUSH: CPT

## 2018-09-10 PROCEDURE — 85025 COMPLETE CBC W/AUTO DIFF WBC: CPT

## 2018-09-10 PROCEDURE — 36415 COLL VENOUS BLD VENIPUNCTURE: CPT

## 2018-09-10 PROCEDURE — 73562 X-RAY EXAM OF KNEE 3: CPT

## 2018-09-10 PROCEDURE — 99284 EMERGENCY DEPT VISIT MOD MDM: CPT

## 2018-09-10 PROCEDURE — 2580000003 HC RX 258: Performed by: PERSONAL EMERGENCY RESPONSE ATTENDANT

## 2018-09-10 PROCEDURE — 6370000000 HC RX 637 (ALT 250 FOR IP): Performed by: PERSONAL EMERGENCY RESPONSE ATTENDANT

## 2018-09-10 PROCEDURE — 80048 BASIC METABOLIC PNL TOTAL CA: CPT

## 2018-09-10 PROCEDURE — 6360000002 HC RX W HCPCS: Performed by: PERSONAL EMERGENCY RESPONSE ATTENDANT

## 2018-09-10 RX ORDER — KETOROLAC TROMETHAMINE 30 MG/ML
30 INJECTION, SOLUTION INTRAMUSCULAR; INTRAVENOUS ONCE
Status: COMPLETED | OUTPATIENT
Start: 2018-09-10 | End: 2018-09-10

## 2018-09-10 RX ORDER — 0.9 % SODIUM CHLORIDE 0.9 %
1000 INTRAVENOUS SOLUTION INTRAVENOUS ONCE
Status: COMPLETED | OUTPATIENT
Start: 2018-09-10 | End: 2018-09-10

## 2018-09-10 RX ORDER — TRAMADOL HYDROCHLORIDE 50 MG/1
50 TABLET ORAL ONCE
Status: COMPLETED | OUTPATIENT
Start: 2018-09-10 | End: 2018-09-10

## 2018-09-10 RX ORDER — CYCLOBENZAPRINE HCL 5 MG
5 TABLET ORAL 3 TIMES DAILY PRN
Qty: 6 TABLET | Refills: 0 | Status: SHIPPED | OUTPATIENT
Start: 2018-09-10 | End: 2018-09-20

## 2018-09-10 RX ADMIN — KETOROLAC TROMETHAMINE 30 MG: 30 INJECTION, SOLUTION INTRAMUSCULAR at 19:53

## 2018-09-10 RX ADMIN — SODIUM CHLORIDE 1000 ML: 9 INJECTION, SOLUTION INTRAVENOUS at 19:54

## 2018-09-10 RX ADMIN — TRAMADOL HYDROCHLORIDE 50 MG: 50 TABLET, FILM COATED ORAL at 21:15

## 2018-09-10 ASSESSMENT — ENCOUNTER SYMPTOMS
NAUSEA: 0
RHINORRHEA: 0
BLOOD IN STOOL: 0
VOMITING: 0
DIARRHEA: 0
ABDOMINAL PAIN: 0
COLOR CHANGE: 0
SHORTNESS OF BREATH: 0
SORE THROAT: 0
COUGH: 0

## 2018-09-10 ASSESSMENT — PAIN SCALES - GENERAL
PAINLEVEL_OUTOF10: 10

## 2018-09-10 ASSESSMENT — PAIN DESCRIPTION - ORIENTATION: ORIENTATION: RIGHT;POSTERIOR

## 2018-09-10 ASSESSMENT — PAIN DESCRIPTION - LOCATION: LOCATION: KNEE

## 2018-09-10 ASSESSMENT — PAIN DESCRIPTION - DESCRIPTORS: DESCRIPTORS: ACHING

## 2018-09-10 ASSESSMENT — PAIN DESCRIPTION - PAIN TYPE: TYPE: ACUTE PAIN

## 2018-09-10 NOTE — ED PROVIDER NOTES
Height Weight   (!) 74/51 97.6 °F (36.4 °C) Oral 61 18 97 % 5' (1.524 m) 160 lb (72.6 kg)       Physical Exam   Constitutional: She is oriented to person, place, and time. She appears well-developed and well-nourished. HENT:   Head: Normocephalic and atraumatic. Mouth/Throat: Oropharynx is clear and moist.   Eyes: Pupils are equal, round, and reactive to light. Conjunctivae and EOM are normal.   Neck: Normal range of motion. Neck supple. No tracheal deviation present. Cardiovascular: Normal heart sounds and intact distal pulses. Pulmonary/Chest: Effort normal and breath sounds normal. No stridor. No respiratory distress. Abdominal: Soft. Bowel sounds are normal. She exhibits no distension and no mass. There is no tenderness. There is no rebound and no guarding. Musculoskeletal: Normal range of motion. She exhibits tenderness. She exhibits no edema. Patient has moderate tenderness to palpation on posterior knee and posterior hamstring. MSP intact distally. No shortening, no hip or pelvic pain/instability   Neurological: She is alert and oriented to person, place, and time. She has normal reflexes. Skin: Skin is warm and dry. No rash noted. Psychiatric: She has a normal mood and affect.  Her behavior is normal. Judgment and thought content normal.       DIAGNOSTIC RESULTS     EKG: All EKG's are interpreted by the Emergency Department Physician who either signs or Co-signs this chart in the absence of a cardiologist.        RADIOLOGY:   Non-plain film images such as CT, Ultrasound and MRI are read by the radiologist. Plain radiographic images are visualized and preliminarily interpreted by the emergency physician with the below findings:    Interpretation per the Radiologist below, if available at the time of this note:    XR KNEE RIGHT (3 VIEWS)    (Results Pending)           LABS:  Labs Reviewed   BASIC METABOLIC PANEL - Abnormal; Notable for the following:        Result Value    CO2 19 (*) Anion Gap 16 (*)     Glucose 224 (*)     All other components within normal limits   CBC WITH AUTO DIFFERENTIAL - Abnormal; Notable for the following:     RBC 4.01 (*)     MCH 32.3 (*)     Neutrophils # 6.6 (*)     All other components within normal limits    Narrative:     phleb to redraw   POCT GLUCOSE - Abnormal; Notable for the following:     POC Glucose 303 (*)     All other components within normal limits   POCT GLUCOSE - Normal   SPECIMEN REJECTION       All other labs were within normal range or not returned as of this dictation. EMERGENCY DEPARTMENT COURSE and DIFFERENTIAL DIAGNOSIS/MDM:   Vitals:    Vitals:    09/10/18 1928 09/10/18 1948 09/10/18 2030 09/10/18 2116   BP: 92/61 94/62 115/60 108/61   Pulse: 65  64 67   Resp:   16    Temp:       TempSrc:       SpO2:   98%    Weight:       Height:             MDM    X-ray shows no acute fractures. Patient's lab work is unremarkable. Patient did arrive slightly hypotensive and was given 1 L IV fluids and Toradol and tramadol for pain. Patient has denied crutches at this time. Standard anticipatory guidance given to patient upon discharge. Have given them a specific time frame in which to follow-up and who to follow-up with. I have also advised them that they should return to the emergency department if they get worse, or not getting better or develop any new or concerning symptoms. Patient demonstrates understanding. CRITICAL CARE TIME   Total Critical Care time was 0 minutes, excluding separately reportable procedures. There was a high probability of clinically significant/life threatening deterioration in the patient's condition which required my urgent intervention. Procedures    FINAL IMPRESSION      1.  Strain of right hamstring          DISPOSITION/PLAN   DISPOSITION Decision To Discharge 09/10/2018 09:15:59 PM      PATIENT REFERRED TO:  Kobe Busby MD  [facility]  [phone]    In 1 week        DISCHARGE MEDICATIONS:  New Prescriptions    CYCLOBENZAPRINE (FLEXERIL) 5 MG TABLET    Take 1 tablet by mouth 3 times daily as needed for Muscle spasms          (Please note that portions of this note were completed with a voice recognition program.  Efforts were made to edit the dictations but occasionally words are mis-transcribed. )    ANYI Herrera (electronically signed)  Emergency Physician [address] [place] [address], [facility] Manan Vickers  09/10/18 6840

## 2018-10-24 ENCOUNTER — OFFICE VISIT (OUTPATIENT)
Dept: FAMILY MEDICINE CLINIC | Age: 63
End: 2018-10-24
Payer: COMMERCIAL

## 2018-10-24 VITALS
WEIGHT: 160.6 LBS | BODY MASS INDEX: 31.53 KG/M2 | TEMPERATURE: 98.5 F | OXYGEN SATURATION: 98 % | HEIGHT: 60 IN | DIASTOLIC BLOOD PRESSURE: 72 MMHG | SYSTOLIC BLOOD PRESSURE: 124 MMHG | HEART RATE: 99 BPM

## 2018-10-24 DIAGNOSIS — J06.9 ACUTE URI: Primary | ICD-10-CM

## 2018-10-24 PROCEDURE — G8484 FLU IMMUNIZE NO ADMIN: HCPCS | Performed by: NURSE PRACTITIONER

## 2018-10-24 PROCEDURE — G8427 DOCREV CUR MEDS BY ELIG CLIN: HCPCS | Performed by: NURSE PRACTITIONER

## 2018-10-24 PROCEDURE — 1036F TOBACCO NON-USER: CPT | Performed by: NURSE PRACTITIONER

## 2018-10-24 PROCEDURE — 3017F COLORECTAL CA SCREEN DOC REV: CPT | Performed by: NURSE PRACTITIONER

## 2018-10-24 PROCEDURE — 99213 OFFICE O/P EST LOW 20 MIN: CPT | Performed by: NURSE PRACTITIONER

## 2018-10-24 PROCEDURE — G8417 CALC BMI ABV UP PARAM F/U: HCPCS | Performed by: NURSE PRACTITIONER

## 2018-10-24 RX ORDER — DEXTROMETHORPHAN HYDROBROMIDE AND PROMETHAZINE HYDROCHLORIDE 15; 6.25 MG/5ML; MG/5ML
5 SYRUP ORAL 4 TIMES DAILY PRN
Qty: 180 ML | Refills: 0 | Status: SHIPPED | OUTPATIENT
Start: 2018-10-24 | End: 2018-10-29

## 2018-10-24 RX ORDER — ERGOCALCIFEROL 1.25 MG/1
CAPSULE ORAL
Qty: 4 CAPSULE | Refills: 3 | Status: SHIPPED | OUTPATIENT
Start: 2018-10-24 | End: 2019-07-09 | Stop reason: SDUPTHER

## 2018-10-24 RX ORDER — AZITHROMYCIN 250 MG/1
TABLET, FILM COATED ORAL
Qty: 6 TABLET | Refills: 0 | Status: SHIPPED | OUTPATIENT
Start: 2018-10-24 | End: 2018-10-29

## 2018-10-24 RX ORDER — CYCLOBENZAPRINE HCL 10 MG
TABLET ORAL
Refills: 1 | COMMUNITY
Start: 2018-10-10 | End: 2019-07-09 | Stop reason: SDUPTHER

## 2018-10-24 RX ORDER — NAPROXEN 500 MG/1
TABLET ORAL
Refills: 0 | COMMUNITY
Start: 2018-10-10 | End: 2019-07-09

## 2018-10-24 ASSESSMENT — ENCOUNTER SYMPTOMS
EYE REDNESS: 0
STRIDOR: 0
DIARRHEA: 0
ABDOMINAL PAIN: 0
EYE PAIN: 0
COUGH: 1
EYE DISCHARGE: 0
WHEEZING: 1
SINUS PAIN: 0
SINUS PRESSURE: 0
SHORTNESS OF BREATH: 0
VOMITING: 0
SWOLLEN GLANDS: 0
CHEST TIGHTNESS: 0
RHINORRHEA: 0
TROUBLE SWALLOWING: 0
FACIAL SWELLING: 0
PHOTOPHOBIA: 0
EYE ITCHING: 0
SORE THROAT: 1
NAUSEA: 1

## 2018-10-24 NOTE — PROGRESS NOTES
vitals reviewed. POC Testing Today: No results found for this visit on 10/24/18. Assessment & Plan    Diagnosis Orders   1. Acute URI  azithromycin (ZITHROMAX) 250 MG tablet    promethazine-dextromethorphan (PROMETHAZINE-DM) 6.25-15 MG/5ML syrup       No orders of the defined types were placed in this encounter. Antibiotic Instructions: Complete the full course of antibiotics as ordered. Take each dose with a small snack or meal to lessen potential GI upset. To prevent antibiotic resistance, please take medication as ordered and for the full duration even if you start to feel better. Consider intake of yogurt or probiotic during antibiotic use and for a few days after to help reduce the risk of developing a secondary infection. Separate the yogurt and antibiotic by at least 1 hour. Avoid alcohol while taking antibiotics. Discussed that the cough medicine is to be taken at home only. May not drive or operate machinery while taking it. Cosimo Pronto verbalized understanding. Return if symptoms worsen or fail to improve, for follow-up with PCP. Side effects and adverse effects of any medication prescribed today, as well as treatment plan/rationale,follow-up care, and result expectations have been discussed with the patient. Expresses understanding and desires to proceed with treatment plan. Discussed signs and symptoms which require immediate follow-up in ED/call to 911. Understanding verbalized. I have reviewed and updated the electronic medical record.     EJ Reynolds NP

## 2018-10-30 RX ORDER — DILTIAZEM HYDROCHLORIDE 240 MG/1
CAPSULE, EXTENDED RELEASE ORAL
Qty: 30 CAPSULE | Refills: 0 | Status: SHIPPED | OUTPATIENT
Start: 2018-10-30 | End: 2018-12-10 | Stop reason: SDUPTHER

## 2018-12-10 RX ORDER — DILTIAZEM HYDROCHLORIDE 240 MG/1
CAPSULE, EXTENDED RELEASE ORAL
Qty: 30 CAPSULE | Refills: 5 | Status: SHIPPED | OUTPATIENT
Start: 2018-12-10 | End: 2019-06-17 | Stop reason: SDUPTHER

## 2018-12-10 RX ORDER — DILTIAZEM HYDROCHLORIDE 240 MG/1
CAPSULE, EXTENDED RELEASE ORAL
Qty: 30 CAPSULE | Refills: 3 | Status: SHIPPED | OUTPATIENT
Start: 2018-12-10 | End: 2019-06-18 | Stop reason: SDUPTHER

## 2019-06-18 RX ORDER — DILTIAZEM HYDROCHLORIDE 240 MG/1
CAPSULE, EXTENDED RELEASE ORAL
Qty: 30 CAPSULE | Refills: 4 | Status: SHIPPED | OUTPATIENT
Start: 2019-06-18 | End: 2019-11-20 | Stop reason: SDUPTHER

## 2019-07-09 ENCOUNTER — OFFICE VISIT (OUTPATIENT)
Dept: FAMILY MEDICINE CLINIC | Age: 64
End: 2019-07-09
Payer: COMMERCIAL

## 2019-07-09 VITALS
SYSTOLIC BLOOD PRESSURE: 126 MMHG | TEMPERATURE: 98.2 F | RESPIRATION RATE: 14 BRPM | HEART RATE: 84 BPM | WEIGHT: 162 LBS | HEIGHT: 60 IN | DIASTOLIC BLOOD PRESSURE: 88 MMHG | OXYGEN SATURATION: 98 % | BODY MASS INDEX: 31.8 KG/M2

## 2019-07-09 DIAGNOSIS — E78.00 HYPERCHOLESTEREMIA: ICD-10-CM

## 2019-07-09 DIAGNOSIS — M79.604 RIGHT LEG PAIN: ICD-10-CM

## 2019-07-09 DIAGNOSIS — B37.31 VAGINAL YEAST INFECTION: ICD-10-CM

## 2019-07-09 DIAGNOSIS — E11.8 TYPE 2 DIABETES MELLITUS WITH COMPLICATION, WITHOUT LONG-TERM CURRENT USE OF INSULIN (HCC): Primary | ICD-10-CM

## 2019-07-09 DIAGNOSIS — G62.9 NEUROPATHY: ICD-10-CM

## 2019-07-09 LAB — HBA1C MFR BLD: 7.7 %

## 2019-07-09 PROCEDURE — 99214 OFFICE O/P EST MOD 30 MIN: CPT | Performed by: NURSE PRACTITIONER

## 2019-07-09 PROCEDURE — 83036 HEMOGLOBIN GLYCOSYLATED A1C: CPT | Performed by: NURSE PRACTITIONER

## 2019-07-09 RX ORDER — CELECOXIB 100 MG/1
100 CAPSULE ORAL 2 TIMES DAILY
Qty: 60 CAPSULE | Refills: 0 | Status: SHIPPED | OUTPATIENT
Start: 2019-07-09 | End: 2019-08-21 | Stop reason: SDUPTHER

## 2019-07-09 RX ORDER — CYCLOBENZAPRINE HCL 10 MG
10 TABLET ORAL 3 TIMES DAILY PRN
Qty: 30 TABLET | Refills: 1 | Status: SHIPPED | OUTPATIENT
Start: 2019-07-09 | End: 2020-10-01

## 2019-07-09 RX ORDER — SENNOSIDES 8.6 MG
650 CAPSULE ORAL EVERY 8 HOURS PRN
Qty: 60 TABLET | Status: CANCELLED | OUTPATIENT
Start: 2019-07-09

## 2019-07-09 RX ORDER — GABAPENTIN 300 MG/1
300 CAPSULE ORAL NIGHTLY
Qty: 90 CAPSULE | Refills: 1 | Status: SHIPPED | OUTPATIENT
Start: 2019-07-09 | End: 2020-10-01

## 2019-07-09 RX ORDER — ERGOCALCIFEROL 1.25 MG/1
CAPSULE ORAL
Qty: 12 CAPSULE | Refills: 3 | Status: SHIPPED | OUTPATIENT
Start: 2019-07-09 | End: 2019-11-20 | Stop reason: SDUPTHER

## 2019-07-09 RX ORDER — ROSUVASTATIN CALCIUM 20 MG/1
20 TABLET, COATED ORAL DAILY
Qty: 90 TABLET | Refills: 3 | Status: SHIPPED | OUTPATIENT
Start: 2019-07-09 | End: 2020-10-14 | Stop reason: SDUPTHER

## 2019-07-09 RX ORDER — NYSTATIN 100000 U/G
CREAM TOPICAL
Qty: 30 G | Refills: 1 | Status: SHIPPED | OUTPATIENT
Start: 2019-07-09 | End: 2020-09-01 | Stop reason: SDUPTHER

## 2019-07-09 ASSESSMENT — PATIENT HEALTH QUESTIONNAIRE - PHQ9
2. FEELING DOWN, DEPRESSED OR HOPELESS: 0
SUM OF ALL RESPONSES TO PHQ QUESTIONS 1-9: 0
SUM OF ALL RESPONSES TO PHQ QUESTIONS 1-9: 0
1. LITTLE INTEREST OR PLEASURE IN DOING THINGS: 0
SUM OF ALL RESPONSES TO PHQ9 QUESTIONS 1 & 2: 0
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
## 2019-07-28 ASSESSMENT — ENCOUNTER SYMPTOMS
DIARRHEA: 0
SHORTNESS OF BREATH: 0
VISUAL CHANGE: 0
BACK PAIN: 0
SORE THROAT: 0
ABDOMINAL PAIN: 0
COUGH: 0
VOMITING: 0
EYES NEGATIVE: 1
COLOR CHANGE: 0
CONSTIPATION: 0
STRIDOR: 0
BLURRED VISION: 0
NAUSEA: 0

## 2019-08-19 ENCOUNTER — TELEPHONE (OUTPATIENT)
Dept: FAMILY MEDICINE CLINIC | Age: 64
End: 2019-08-19

## 2019-09-03 ENCOUNTER — TELEPHONE (OUTPATIENT)
Dept: FAMILY MEDICINE CLINIC | Age: 64
End: 2019-09-03

## 2019-11-18 ENCOUNTER — OFFICE VISIT (OUTPATIENT)
Dept: FAMILY MEDICINE CLINIC | Age: 64
End: 2019-11-18
Payer: COMMERCIAL

## 2019-11-18 ENCOUNTER — HOSPITAL ENCOUNTER (OUTPATIENT)
Dept: GENERAL RADIOLOGY | Age: 64
Discharge: HOME | End: 2019-11-20
Payer: COMMERCIAL

## 2019-11-18 VITALS
TEMPERATURE: 98.1 F | BODY MASS INDEX: 32 KG/M2 | WEIGHT: 163 LBS | DIASTOLIC BLOOD PRESSURE: 70 MMHG | SYSTOLIC BLOOD PRESSURE: 118 MMHG | OXYGEN SATURATION: 98 % | HEART RATE: 81 BPM | RESPIRATION RATE: 16 BRPM | HEIGHT: 60 IN

## 2019-11-18 DIAGNOSIS — R50.9 FEVER AND CHILLS: ICD-10-CM

## 2019-11-18 DIAGNOSIS — R06.02 SHORTNESS OF BREATH ON EXERTION: ICD-10-CM

## 2019-11-18 DIAGNOSIS — J06.9 VIRAL URI: Primary | ICD-10-CM

## 2019-11-18 DIAGNOSIS — R00.2 PALPITATIONS: ICD-10-CM

## 2019-11-18 DIAGNOSIS — R53.83 FATIGUE, UNSPECIFIED TYPE: ICD-10-CM

## 2019-11-18 LAB
INFLUENZA A ANTIBODY: NORMAL
INFLUENZA B ANTIBODY: NORMAL

## 2019-11-18 PROCEDURE — 71111 X-RAY EXAM RIBS/CHEST4/> VWS: CPT

## 2019-11-18 PROCEDURE — 99213 OFFICE O/P EST LOW 20 MIN: CPT | Performed by: NURSE PRACTITIONER

## 2019-11-18 PROCEDURE — 87804 INFLUENZA ASSAY W/OPTIC: CPT | Performed by: NURSE PRACTITIONER

## 2019-11-18 PROCEDURE — 93000 ELECTROCARDIOGRAM COMPLETE: CPT | Performed by: NURSE PRACTITIONER

## 2019-11-18 PROCEDURE — 71046 X-RAY EXAM CHEST 2 VIEWS: CPT

## 2019-11-19 ASSESSMENT — ENCOUNTER SYMPTOMS
TROUBLE SWALLOWING: 0
COUGH: 0
EYE PAIN: 0
VOMITING: 0
DIARRHEA: 1
EYE DISCHARGE: 0
COLOR CHANGE: 0
SINUS PAIN: 0
SHORTNESS OF BREATH: 1
NAUSEA: 0
SORE THROAT: 0
WHEEZING: 0
RHINORRHEA: 1
SINUS PRESSURE: 0

## 2019-11-20 RX ORDER — ERGOCALCIFEROL 1.25 MG/1
CAPSULE ORAL
Qty: 12 CAPSULE | Refills: 3 | Status: SHIPPED | OUTPATIENT
Start: 2019-11-20 | End: 2020-10-14 | Stop reason: SDUPTHER

## 2019-11-20 RX ORDER — DILTIAZEM HYDROCHLORIDE 240 MG/1
240 CAPSULE, EXTENDED RELEASE ORAL DAILY
Qty: 30 CAPSULE | Refills: 4 | Status: SHIPPED | OUTPATIENT
Start: 2019-11-20 | End: 2020-04-17

## 2020-01-13 NOTE — TELEPHONE ENCOUNTER
PT requesting medication refill. Rx requested:  Requested Prescriptions     Pending Prescriptions Disp Refills    metFORMIN (GLUCOPHAGE) 500 MG tablet 60 tablet 5     Sig: TAKE ONE TABLET BY MOUTH TWICE A DAY WITH MEALS       Last Office Visit:   Visit date not found    Next Visit Date:  No future appointments.

## 2020-03-30 ENCOUNTER — HOSPITAL ENCOUNTER (EMERGENCY)
Age: 65
Discharge: HOME | End: 2020-03-30
Payer: COMMERCIAL

## 2020-03-30 VITALS
OXYGEN SATURATION: 97 % | BODY MASS INDEX: 31.41 KG/M2 | WEIGHT: 160 LBS | DIASTOLIC BLOOD PRESSURE: 80 MMHG | HEIGHT: 60 IN | TEMPERATURE: 97.3 F | SYSTOLIC BLOOD PRESSURE: 142 MMHG | RESPIRATION RATE: 20 BRPM | HEART RATE: 98 BPM

## 2020-03-30 PROCEDURE — 99282 EMERGENCY DEPT VISIT SF MDM: CPT

## 2020-03-30 PROCEDURE — 6370000000 HC RX 637 (ALT 250 FOR IP): Performed by: NURSE PRACTITIONER

## 2020-03-30 RX ORDER — CETIRIZINE HYDROCHLORIDE 10 MG/1
10 TABLET ORAL ONCE
Status: COMPLETED | OUTPATIENT
Start: 2020-03-30 | End: 2020-03-30

## 2020-03-30 RX ORDER — CETIRIZINE HYDROCHLORIDE 10 MG/1
10 TABLET ORAL DAILY
Qty: 10 TABLET | Refills: 0 | Status: SHIPPED | OUTPATIENT
Start: 2020-03-30 | End: 2020-10-14

## 2020-03-30 RX ADMIN — CETIRIZINE HYDROCHLORIDE 10 MG: 10 TABLET, FILM COATED ORAL at 13:33

## 2020-03-30 SDOH — HEALTH STABILITY: MENTAL HEALTH: HOW OFTEN DO YOU HAVE A DRINK CONTAINING ALCOHOL?: NEVER

## 2020-03-30 ASSESSMENT — ENCOUNTER SYMPTOMS
ABDOMINAL PAIN: 0
BACK PAIN: 0
RHINORRHEA: 0
BLOOD IN STOOL: 0
EYE PAIN: 0
WHEEZING: 0
CONSTIPATION: 0
EYE REDNESS: 0
TROUBLE SWALLOWING: 0
VOMITING: 0
SHORTNESS OF BREATH: 0
COLOR CHANGE: 0
NAUSEA: 0
SORE THROAT: 0
DIARRHEA: 0
COUGH: 0
EYE DISCHARGE: 0

## 2020-03-30 NOTE — ED PROVIDER NOTES
History Narrative    None       SCREENINGS             PHYSICAL EXAM    (up to 7 for level 4, 8 or more for level 5)     ED Triage Vitals [03/30/20 1313]   BP Temp Temp Source Pulse Resp SpO2 Height Weight   (!) 142/80 97.3 °F (36.3 °C) Oral 98 20 97 % 5' (1.524 m) 160 lb (72.6 kg)       Physical Exam  Vitals signs and nursing note reviewed. Constitutional:       General: She is not in acute distress. Appearance: She is well-developed. She is not diaphoretic. HENT:      Head: Normocephalic and atraumatic. Nose: Nose normal.   Eyes:      Conjunctiva/sclera: Conjunctivae normal.      Pupils: Pupils are equal, round, and reactive to light. Neck:      Musculoskeletal: Normal range of motion and neck supple. Cardiovascular:      Rate and Rhythm: Normal rate and regular rhythm. Heart sounds: Normal heart sounds. Pulmonary:      Effort: Pulmonary effort is normal. No respiratory distress. Breath sounds: Normal breath sounds. Abdominal:      General: Bowel sounds are normal.      Palpations: Abdomen is soft. Tenderness: There is no abdominal tenderness. Skin:     General: Skin is warm and dry. Capillary Refill: Capillary refill takes less than 2 seconds. Findings: No rash. Neurological:      Mental Status: She is alert and oriented to person, place, and time. Cranial Nerves: No cranial nerve deficit.    Psychiatric:         Behavior: Behavior normal.         RESULTS     EKG: All EKG's are interpreted by the Emergency Department Physician who either signs or Co-signsthis chart in the absence of a cardiologist.        RADIOLOGY:   Non-plain filmimages such as CT, Ultrasound and MRI are read by the radiologist. Plain radiographic images are visualized and preliminarily interpreted by the emergency physician with the below findings:        Interpretation per the Radiologist below, if available at the time ofthis note:    No orders to display         ED BEDSIDE were made to edit the dictations but occasionally words are mis-transcribed.)    EJ Tirado CNP (electronically signed)  Attending Emergency Physician         [facility], APRN - CNP  03/30/20 1918

## 2020-04-17 RX ORDER — DILTIAZEM HYDROCHLORIDE 240 MG/1
CAPSULE, EXTENDED RELEASE ORAL
Qty: 30 CAPSULE | Refills: 0 | Status: SHIPPED | OUTPATIENT
Start: 2020-04-17 | End: 2020-05-23 | Stop reason: SDUPTHER

## 2020-05-23 RX ORDER — DILTIAZEM HYDROCHLORIDE 240 MG/1
240 CAPSULE, EXTENDED RELEASE ORAL DAILY
Qty: 90 CAPSULE | Refills: 0 | Status: SHIPPED | OUTPATIENT
Start: 2020-05-23 | End: 2020-08-21

## 2020-05-26 RX ORDER — DILTIAZEM HYDROCHLORIDE 240 MG/1
CAPSULE, EXTENDED RELEASE ORAL
Qty: 90 CAPSULE | Refills: 0 | Status: SHIPPED | OUTPATIENT
Start: 2020-05-26 | End: 2020-11-16

## 2020-08-13 ENCOUNTER — TELEMEDICINE (OUTPATIENT)
Dept: FAMILY MEDICINE CLINIC | Age: 65
End: 2020-08-13
Payer: COMMERCIAL

## 2020-08-13 PROCEDURE — 99442 PR PHYS/QHP TELEPHONE EVALUATION 11-20 MIN: CPT | Performed by: PHYSICIAN ASSISTANT

## 2020-08-13 NOTE — PROGRESS NOTES
2020     Venkata Murillo (:  1955) is a 62 y.o. female, here for evaluation of the following medical concerns:    HPI  Telemedicine telephone visit due to concern for exposure to [facility] (coronavirus). Patient is aware this is a billable visit. Patient concern for UTI    Review of Systems   Genitourinary: Positive for dysuria. All other systems reviewed and are negative. Prior to Visit Medications    Medication Sig Taking? Authorizing Provider   dilTIAZem (TIAZAC) 240 MG extended release capsule TAKE ONE CAPSULE BY MOUTH EVERY DAY  Lyssa Cross MD   dilTIAZem CANALES [facility]) 240 MG extended release capsule Take 1 capsule by mouth daily  EJ Vargas CNP   metoprolol tartrate (LOPRESSOR) 25 MG tablet Take 25 mg by mouth daily  Historical Provider, MD   cetirizine (ZYRTEC ALLERGY) 10 MG tablet Take 1 tablet by mouth daily  EJ Gresham CNP   metFORMIN (GLUCOPHAGE) 500 MG tablet TAKE ONE TABLET BY MOUTH TWICE A DAY WITH MEALS  Lyssa Cross MD   vitamin D (ERGOCALCIFEROL) 1.25 MG (19361 UT) CAPS capsule TAKE ONE CAPSULE BY MOUTH ONCE A WEEK  Lyssa Cross MD   celecoxib (CELEBREX) 100 MG capsule TAKE ONE CAPSULE BY MOUTH TWO TIMES A DAY  EJ Vargas CNP   rosuvastatin (CRESTOR) 20 MG tablet Take 1 tablet by mouth daily  EJ Vargas - CNP   nystatin (MYCOSTATIN) 744187 UNIT/GM cream Apply topically 2 times daily. Jose Raulregwendolyna EJ Ramirez CNP   cyclobenzaprine (FLEXERIL) 10 MG tablet Take 1 tablet by mouth 3 times daily as needed for Muscle spasms  EJ Vargas - CNP   gabapentin (NEURONTIN) 300 MG capsule Take 1 capsule by mouth nightly for 180 days. Intended supply: 90 days  EJ Vargas CNP   blood glucose monitor kit and supplies Test 2 times a day & as needed for symptoms of irregular blood glucose. Lancets and test strips to be included  EJ Vargas CNP   aspirin 81 MG tablet Take 81 mg by mouth daily.     Historical Provider, MD   nitroGLYCERIN (NITROSTAT) 0.4 MG SL tablet Place 0.4 mg under the tongue every 5 minutes as needed. Historical Provider, MD   acetaminophen (TYLENOL ARTHRITIS PAIN) 650 MG CR tablet Take 650 mg by mouth every 8 hours as needed. Historical Provider, MD        Social History     Tobacco Use    Smoking status: Never Smoker    Smokeless tobacco: Never Used   Substance Use Topics    Alcohol use: Never     Frequency: Never        There were no vitals filed for this visit. Estimated body mass index is 31.25 kg/m² as calculated from the following:    Height as of 3/30/20: 5' (1.524 m). Weight as of 3/30/20: 160 lb (72.6 kg). Physical Exam  Pulmonary:      Effort: Pulmonary effort is normal. No respiratory distress. Breath sounds: No wheezing. Neurological:      Mental Status: She is oriented to person, place, and time. Psychiatric:         Mood and Affect: Mood normal.         Behavior: Behavior normal.         Thought Content: Thought content normal.         Judgment: Judgment normal.              Assessment & Plan    Diagnosis Orders   1. Dysuria  Urine Reflex to Culture         Orders Placed This Encounter   Procedures    Urine Reflex to Culture     Standing Status:   Future     Standing Expiration Date:   8/13/2021     Order Specific Question:   SPECIFY(EX-CATH,MIDSTREAM,CYSTO,ETC)? Answer:   mid stream     No orders of the defined types were placed in this encounter. There are no discontinued medications. No follow-ups on file. 11-20 min visit      No follow-ups on file. An electronic signature was used to authenticate this note.     --Arleen Artis PA-C on 8/13/2020 at 11:17 AM

## 2020-08-19 DIAGNOSIS — R30.0 DYSURIA: ICD-10-CM

## 2020-08-19 LAB
BACTERIA: NEGATIVE /HPF
BILIRUBIN URINE: NEGATIVE
BLOOD, URINE: NEGATIVE
CLARITY: CLEAR
COLOR: YELLOW
EPITHELIAL CELLS, UA: ABNORMAL /HPF (ref 0–5)
GLUCOSE URINE: 100 MG/DL
HYALINE CASTS: ABNORMAL /HPF (ref 0–5)
KETONES, URINE: NEGATIVE MG/DL
LEUKOCYTE ESTERASE, URINE: ABNORMAL
NITRITE, URINE: NEGATIVE
PH UA: 6 (ref 5–9)
PROTEIN UA: NEGATIVE MG/DL
RBC UA: ABNORMAL /HPF (ref 0–5)
SPECIFIC GRAVITY UA: 1.02 (ref 1–1.03)
URINE REFLEX TO CULTURE: ABNORMAL
UROBILINOGEN, URINE: 0.2 E.U./DL
WBC UA: ABNORMAL /HPF (ref 0–5)

## 2020-08-21 RX ORDER — DILTIAZEM HYDROCHLORIDE 240 MG/1
CAPSULE, EXTENDED RELEASE ORAL
Qty: 90 CAPSULE | Refills: 0 | Status: SHIPPED | OUTPATIENT
Start: 2020-08-21 | End: 2020-09-01

## 2020-08-30 ENCOUNTER — OFFICE VISIT (OUTPATIENT)
Dept: FAMILY MEDICINE CLINIC | Age: 65
End: 2020-08-30
Payer: COMMERCIAL

## 2020-08-30 VITALS
WEIGHT: 160 LBS | SYSTOLIC BLOOD PRESSURE: 108 MMHG | HEIGHT: 60 IN | OXYGEN SATURATION: 98 % | TEMPERATURE: 97.3 F | BODY MASS INDEX: 31.41 KG/M2 | HEART RATE: 84 BPM | DIASTOLIC BLOOD PRESSURE: 70 MMHG

## 2020-08-30 PROBLEM — K82.9 GALLBLADDER DISEASE: Status: ACTIVE | Noted: 2020-08-30

## 2020-08-30 PROBLEM — R07.89 OTHER CHEST PAIN: Status: ACTIVE | Noted: 2020-08-30

## 2020-08-30 PROBLEM — N28.1 CYST OF KIDNEY, ACQUIRED: Status: ACTIVE | Noted: 2017-10-30

## 2020-08-30 PROBLEM — Z87.440 PERSONAL HISTORY OF URINARY (TRACT) INFECTIONS: Status: ACTIVE | Noted: 2017-10-30

## 2020-08-30 PROBLEM — M25.471 EFFUSION, RIGHT ANKLE: Status: ACTIVE | Noted: 2017-10-30

## 2020-08-30 PROBLEM — I25.10 CORONARY ATHEROSCLEROSIS: Status: ACTIVE | Noted: 2017-10-30

## 2020-08-30 PROBLEM — Z87.442 PERSONAL HISTORY OF URINARY CALCULI: Status: ACTIVE | Noted: 2017-10-30

## 2020-08-30 PROBLEM — R35.0 FREQUENCY OF MICTURITION: Status: ACTIVE | Noted: 2017-10-30

## 2020-08-30 PROBLEM — R30.9 PAINFUL MICTURITION, UNSPECIFIED: Status: ACTIVE | Noted: 2017-10-30

## 2020-08-30 PROBLEM — M25.472 EFFUSION, LEFT ANKLE: Status: ACTIVE | Noted: 2017-10-30

## 2020-08-30 PROBLEM — I83.813 VARICOSE VEINS OF BILATERAL LOWER EXTREMITIES WITH PAIN: Status: ACTIVE | Noted: 2017-10-30

## 2020-08-30 PROBLEM — Z79.82 LONG TERM (CURRENT) USE OF ASPIRIN: Status: ACTIVE | Noted: 2017-10-30

## 2020-08-30 LAB
BILIRUBIN, POC: NORMAL
BLOOD URINE, POC: NORMAL
CHP ED QC CHECK: NORMAL
CLARITY, POC: CLEAR
COLOR, POC: YELLOW
GLUCOSE BLD-MCNC: 199 MG/DL
GLUCOSE URINE, POC: NORMAL
KETONES, POC: NORMAL
LEUKOCYTE EST, POC: NORMAL
NITRITE, POC: NORMAL
PH, POC: 6
PROTEIN, POC: NORMAL
SPECIFIC GRAVITY, POC: 1.02
UROBILINOGEN, POC: 0.2

## 2020-08-30 PROCEDURE — 96372 THER/PROPH/DIAG INJ SC/IM: CPT | Performed by: NURSE PRACTITIONER

## 2020-08-30 PROCEDURE — 82962 GLUCOSE BLOOD TEST: CPT | Performed by: NURSE PRACTITIONER

## 2020-08-30 PROCEDURE — 81002 URINALYSIS NONAUTO W/O SCOPE: CPT | Performed by: NURSE PRACTITIONER

## 2020-08-30 PROCEDURE — 99214 OFFICE O/P EST MOD 30 MIN: CPT | Performed by: NURSE PRACTITIONER

## 2020-08-30 RX ORDER — PANTOPRAZOLE SODIUM 40 MG/1
40 TABLET, DELAYED RELEASE ORAL
Qty: 30 TABLET | Refills: 0 | Status: SHIPPED | OUTPATIENT
Start: 2020-08-30 | End: 2020-10-01

## 2020-08-30 RX ORDER — PROMETHAZINE HYDROCHLORIDE 25 MG/ML
6.25 INJECTION, SOLUTION INTRAMUSCULAR; INTRAVENOUS ONCE
Status: COMPLETED | OUTPATIENT
Start: 2020-08-30 | End: 2020-08-30

## 2020-08-30 RX ORDER — PROMETHAZINE HYDROCHLORIDE 25 MG/ML
25 INJECTION, SOLUTION INTRAMUSCULAR; INTRAVENOUS ONCE
Qty: 1 ML | Refills: 0
Start: 2020-08-30 | End: 2020-08-30 | Stop reason: CLARIF

## 2020-08-30 RX ORDER — ONDANSETRON 4 MG/1
4 TABLET, ORALLY DISINTEGRATING ORAL EVERY 8 HOURS PRN
Qty: 10 TABLET | Refills: 0 | Status: SHIPPED | OUTPATIENT
Start: 2020-08-30 | End: 2020-09-09

## 2020-08-30 RX ADMIN — PROMETHAZINE HYDROCHLORIDE 6.25 MG: 25 INJECTION, SOLUTION INTRAMUSCULAR; INTRAVENOUS at 15:18

## 2020-08-30 ASSESSMENT — ENCOUNTER SYMPTOMS
SORE THROAT: 0
COUGH: 0
SHORTNESS OF BREATH: 0
ABDOMINAL DISTENTION: 1
ABDOMINAL PAIN: 1
RHINORRHEA: 0
DIARRHEA: 1
NAUSEA: 1
VOMITING: 1

## 2020-08-30 NOTE — LETTER
[facility]  [address] [address]  Phone: [phone]  Fax: [phone] [facility], APRN - CNP        August 30, 2020     Patient: Kade Miranda   YOB: 1955   Date of Visit: 8/30/2020       To Whom it May Concern:    Kade Miranda was seen in my clinic on 8/30/2020. She may return to work on 9/2. If you have any questions or concerns, please don't hesitate to call.     Sincerely,         Jayashree Granados, EJ - CNP

## 2020-08-30 NOTE — PATIENT INSTRUCTIONS
Patient Education        Nausea and Vomiting: Care Instructions  Your Care Instructions     When you are nauseated, you may feel weak and sweaty and notice a lot of saliva in your mouth. Nausea often leads to vomiting. Most of the time you do not need to worry about nausea and vomiting, but they can be signs of other illnesses. Two common causes of nausea and vomiting are stomach flu and food poisoning. Nausea and vomiting from viral stomach flu will usually start to improve within 24 hours. Nausea and vomiting from food poisoning may last from 12 to 48 hours. The doctor has checked you carefully, but problems can develop later. If you notice any problems or new symptoms, get medical treatment right away. Follow-up care is a key part of your treatment and safety. Be sure to make and go to all appointments, and call your doctor if you are having problems. It's also a good idea to know your test results and keep a list of the medicines you take. How can you care for yourself at home? · To prevent dehydration, drink plenty of fluids, enough so that your urine is light yellow or clear like water. Choose water and other caffeine-free clear liquids until you feel better. If you have kidney, heart, or liver disease and have to limit fluids, talk with your doctor before you increase the amount of fluids you drink. · Rest in bed until you feel better. · When you are able to eat, try clear soups, mild foods, and liquids until all symptoms are gone for 12 to 48 hours. Other good choices include dry toast, crackers, cooked cereal, and gelatin dessert, such as Jell-O. When should you call for help? [id number] anytime you think you may need emergency care. For example, call if:  · You passed out (lost consciousness). Call your doctor now or seek immediate medical care if:  · You have symptoms of dehydration, such as:  ? Dry eyes and a dry mouth. ? Passing only a little dark urine. ?  Feeling thirstier than meats.  ? Eat no more than 5 to 6½ ounces of meat a day. ? Cut off all fat you can see. ? Eat chicken and turkey without the skin. ? Many types of fish, such as salmon, lake trout, tuna, and herring, provide healthy omega-3 fat. But, avoid fish canned in oil, such as sardines in olive oil. ? Bake, broil, or grill meats, poultry, or fish instead of frying them in butter or fat. · Drink or eat nonfat or low-fat milk, yogurt, cheese, or other milk products each day. ? Read the labels on cheeses, and choose those with less than 5 grams of fat an ounce. ? Try fat-free sour cream, cream cheese, or yogurt. ? Avoid cream soups and cream sauces on pasta. ? Eat low-fat ice cream, frozen yogurt, or sorbet. Avoid regular ice cream.  · Eat whole-grain cereals, breads, crackers, rice, or pasta. Avoid high-fat foods such as croissants, scones, biscuits, waffles, doughnuts, muffins, granola, and high-fat breads. · Flavor your foods with herbs and spices (such as basil, tarragon, or mint), fat-free sauces, or lemon juice instead of butter. You can also use butter substitutes, fat-free mayonnaise, or fat-free dressing. · Try applesauce, prune puree, or mashed bananas to replace some or all of the fat when you bake. · Limit fats and oils, such as butter, margarine, mayonnaise, and salad dressing, to no more than 1 tablespoon a meal.  · Avoid high-fat foods, such as:  ? Chocolate, whole milk, ice cream, and processed cheese. ? Fried or buttered foods. ? Sausage, salami, and hardy. ? Cinnamon rolls, cakes, pies, cookies, and other pastries. ? Prepared snack foods, such as potato chips, nut and granola bars, and mixed nuts. ? Coconut and avocado. · Learn how to read food labels for serving sizes and ingredients. Fast-food and convenience-food meals often have lots of fat. Where can you learn more? Go to https://chpeannemarieeb.[facility]. org and sign in to your [employer] account.  Enter C492 in the [address] Information box to learn more about \"Low-Fat Diet for Gallbladder Disease: Care Instructions. \"     If you do not have an account, please click on the \"Sign Up Now\" link. Current as of: August 22, 2019               Content Version: 12.5  © 7816-0954 Healthwise, Incorporated. Care instructions adapted under license by [facility] ([facility]). If you have questions about a medical condition or this instruction, always ask your healthcare professional. [address] any warranty or liability for your use of this information.

## 2020-08-30 NOTE — PROGRESS NOTES
Subjective:      Patient ID: Iwona Chu is a 62 y.o. female who presents today for:  Chief Complaint   Patient presents with    Nausea & Vomiting     Patient here today with nasea, vommitting, diziness, body aches started yesturday        HPI       Since July GB-attacks more frequently  Suppose to have GB surgery soon-has an appt with PCP Tues to get clearance  Yesterday since 3am throwing up, dizziness, back pain, center chest pain, acid reflux, abd pain  These are similar symptoms Marisabel had but they are getting worse  Hard to eat now  Yesterday ate a steak sandwich and haven't been able to eat since then  A lot of pain-have pain medication at home   Diarrhea as well  A little headache yesterday  First emisis-food  2nd time white liquid  A lot of acid reflux-luiz  Had a UTI and took medication from an [address] [place] [address] visit, still having dysuria  She wants her sugar checked    Past Medical History:   Diagnosis Date    [facility]rial fibrillation ([facility] [address]     Congenital heart disease     Diabetes mellitus ([facility]     Hyperlipidemia     Hypertension      Past Surgical History:   Procedure Laterality Date     SECTION      KIDNEY STONE SURGERY       Social History     Socioeconomic History    Marital status:      Spouse name: Not on file    Number of children: Not on file    Years of education: Not on file    Highest education level: Not on file   Occupational History    Not on file   Social Needs    Financial resource strain: Not on file    Food insecurity     Worry: Not on file     Inability: Not on file    Transportation needs     Medical: Not on file     Non-medical: Not on file   Tobacco Use    Smoking status: Never Smoker    Smokeless tobacco: Never Used   Substance and Sexual Activity    Alcohol use: Never     Frequency: Never    Drug use: No    Sexual activity: Not on file   Lifestyle    Physical activity     Days per week: Not on file     Minutes per session: Not on file    Stress: Not on file   Relationships    Social connections     Talks on phone: Not on file     Gets together: Not on file     Attends Spiritism service: Not on file     Active member of club or organization: Not on file     Attends meetings of clubs or organizations: Not on file     Relationship status: Not on file    Intimate partner violence     Fear of current or ex partner: Not on file     Emotionally abused: Not on file     Physically abused: Not on file     Forced sexual activity: Not on file   Other Topics Concern    Not on file   Social History Narrative    Not on file     Family History   Problem Relation Age of Onset    Heart Disease Mother     Diabetes Mother     Heart Attack Father     Diabetes Sister     Diabetes Brother     Diabetes Sister     Heart Disease Sister      No Known Allergies  Current Outpatient Medications   Medication Sig Dispense Refill    ondansetron (ZOFRAN-ODT) 4 MG disintegrating tablet Take 1 tablet by mouth every 8 hours as needed for Nausea or Vomiting 10 tablet 0    pantoprazole (PROTONIX) 40 MG tablet Take 1 tablet by mouth every morning (before breakfast) 30 tablet 0    dilTIAZem (TIAZAC) 240 MG extended release capsule TAKE ONE CAPSULE BY MOUTH EVERY DAY 90 capsule 0    dilTIAZem (TIAZAC) 240 MG extended release capsule TAKE ONE CAPSULE BY MOUTH EVERY DAY 90 capsule 0    metoprolol tartrate (LOPRESSOR) 25 MG tablet Take 25 mg by mouth daily      cetirizine (ZYRTEC ALLERGY) 10 MG tablet Take 1 tablet by mouth daily 10 tablet 0    metFORMIN (GLUCOPHAGE) 500 MG tablet TAKE ONE TABLET BY MOUTH TWICE A DAY WITH MEALS 60 tablet 5    vitamin D (ERGOCALCIFEROL) 1.25 MG (89452 UT) CAPS capsule TAKE ONE CAPSULE BY MOUTH ONCE A WEEK 12 capsule 3    celecoxib (CELEBREX) 100 MG capsule TAKE ONE CAPSULE BY MOUTH TWO TIMES A  capsule 3    rosuvastatin (CRESTOR) 20 MG tablet Take 1 tablet by mouth daily 90 tablet 3    nystatin (MYCOSTATIN) 722544 UNIT/GM cream Mucous membranes are moist.   Eyes:      General: Lids are normal.         Right eye: No foreign body. Left eye: No foreign body. Extraocular Movements: Extraocular movements intact. Conjunctiva/sclera: Conjunctivae normal.   Neck:      Musculoskeletal: Normal range of motion. Cardiovascular:      Rate and Rhythm: Normal rate and regular rhythm. Heart sounds: Normal heart sounds. Pulmonary:      Effort: Pulmonary effort is normal. No tachypnea, accessory muscle usage or respiratory distress. Breath sounds: Normal breath sounds. No wheezing or rhonchi. Abdominal:      General: Abdomen is flat. Bowel sounds are normal.      Palpations: Abdomen is soft. There is no mass. Tenderness: There is abdominal tenderness in the right upper quadrant, right lower quadrant, epigastric area, left upper quadrant and left lower quadrant. There is no guarding or rebound. Comments: Abd pain all over. Epigastric and RUQ worse   Musculoskeletal: Normal range of motion. Lymphadenopathy:      Cervical: No cervical adenopathy. Upper Body:      Right upper body: No supraclavicular adenopathy. Left upper body: No supraclavicular adenopathy. Skin:     General: Skin is warm and dry. Capillary Refill: Capillary refill takes less than 2 seconds. Neurological:      General: No focal deficit present. Mental Status: She is alert and oriented to person, place, and time. Cranial Nerves: Cranial nerves are intact. Gait: Gait is intact. Psychiatric:         Mood and Affect: Mood normal.         Speech: Speech normal.         Behavior: Behavior normal. Behavior is cooperative. Thought Content: Thought content normal.         Judgment: Judgment normal.         Assessment:       Diagnosis Orders   1.  Nausea  POCT Glucose    ondansetron (ZOFRAN-ODT) 4 MG disintegrating tablet    promethazine (PHENERGAN) injection 6.25 mg    DISCONTINUED: promethazine (PHENERGAN) 25 MG/ML injection   2. Gastroesophageal reflux disease, esophagitis presence not specified  pantoprazole (PROTONIX) 40 MG tablet   3. Dysuria  Culture, Urine    POCT Urinalysis no Micro     Results for POC orders placed in visit on 08/30/20   POCT Glucose   Result Value Ref Range    Glucose 199 mg/dL    QC OK? POCT Urinalysis no Micro   Result Value Ref Range    Color, UA yellow     Clarity, UA clear     Glucose, UA POC neg     Bilirubin, UA neg     Ketones, UA neg     Spec Grav, UA 1.020     Blood, UA POC neg     pH, UA 6.0     Protein, UA POC neg     Urobilinogen, UA 0.2     Leukocytes, UA neg     Nitrite, UA neg       Plan: Will treat nausea    UA looks normal, will send culture to be sure infection gone    Pt to see PCP Tuesday    She has pain pills at home already      Rossside was seen today for nausea & vomiting. Diagnoses and all orders for this visit:    Nausea  -     POCT Glucose  -     Discontinue: promethazine (PHENERGAN) 25 MG/ML injection; Inject 1 mL into the muscle once for 1 dose  -     ondansetron (ZOFRAN-ODT) 4 MG disintegrating tablet; Take 1 tablet by mouth every 8 hours as needed for Nausea or Vomiting  -     promethazine (PHENERGAN) injection 6.25 mg    Gastroesophageal reflux disease, esophagitis presence not specified  -     pantoprazole (PROTONIX) 40 MG tablet; Take 1 tablet by mouth every morning (before breakfast)    Dysuria  -     Culture, Urine; Future  -     POCT Urinalysis no Micro      Orders Placed This Encounter   Procedures    Culture, Urine     Standing Status:   Future     Standing Expiration Date:   8/30/2021     Order Specific Question:   Specify (ex-cath, midstream, cysto, etc)?      Answer:   midstream    POCT Glucose    POCT Urinalysis no Micro     Orders Placed This Encounter   Medications    DISCONTD: promethazine (PHENERGAN) 25 MG/ML injection     Sig: Inject 1 mL into the muscle once for 1 dose     Dispense:  1 mL     Refill:  0    ondansetron (ZOFRAN-ODT) 4 MG disintegrating tablet     Sig: Take 1 tablet by mouth every 8 hours as needed for Nausea or Vomiting     Dispense:  10 tablet     Refill:  0    pantoprazole (PROTONIX) 40 MG tablet     Sig: Take 1 tablet by mouth every morning (before breakfast)     Dispense:  30 tablet     Refill:  0    promethazine (PHENERGAN) injection 6.25 mg     Medications Discontinued During This Encounter   Medication Reason    promethazine (PHENERGAN) 25 MG/ML injection ERROR     Return for Follow up with PCP as scheduled Tuesday. Reviewed with the patient/family: current clinical status & medications. Side effects of the medication prescribed today, as well as treatment plan/rationale and result expectations have been discussed with the patient/family who expresses understanding. Patient will be discharged home in stable condition. Follow up with PCP to evaluate treatment results or return if symptoms worsen or fail to improve. Discussed signs and symptoms which require immediate follow-up in ED/call to 911. Understanding verbalized. I have reviewed the patient's medical history in detail and updated the computerized patient record.     Jackie Rosenthal, APRN - CNP

## 2020-08-31 DIAGNOSIS — R30.0 DYSURIA: ICD-10-CM

## 2020-09-01 ENCOUNTER — TELEPHONE (OUTPATIENT)
Dept: FAMILY MEDICINE CLINIC | Age: 65
End: 2020-09-01

## 2020-09-01 ENCOUNTER — OFFICE VISIT (OUTPATIENT)
Dept: FAMILY MEDICINE CLINIC | Age: 65
End: 2020-09-01
Payer: COMMERCIAL

## 2020-09-01 VITALS
HEIGHT: 60 IN | SYSTOLIC BLOOD PRESSURE: 114 MMHG | WEIGHT: 167 LBS | TEMPERATURE: 97.5 F | RESPIRATION RATE: 16 BRPM | DIASTOLIC BLOOD PRESSURE: 66 MMHG | OXYGEN SATURATION: 99 % | HEART RATE: 77 BPM | BODY MASS INDEX: 32.79 KG/M2

## 2020-09-01 LAB
CHP ED QC CHECK: ABNORMAL
GLUCOSE BLD-MCNC: 240 MG/DL
HBA1C MFR BLD: 9.7 %

## 2020-09-01 PROCEDURE — 82962 GLUCOSE BLOOD TEST: CPT | Performed by: NURSE PRACTITIONER

## 2020-09-01 PROCEDURE — 99214 OFFICE O/P EST MOD 30 MIN: CPT | Performed by: NURSE PRACTITIONER

## 2020-09-01 PROCEDURE — 93000 ELECTROCARDIOGRAM COMPLETE: CPT | Performed by: NURSE PRACTITIONER

## 2020-09-01 PROCEDURE — 83036 HEMOGLOBIN GLYCOSYLATED A1C: CPT | Performed by: NURSE PRACTITIONER

## 2020-09-01 RX ORDER — SITAGLIPTIN AND METFORMIN HYDROCHLORIDE 100; 1000 MG/1; MG/1
1 TABLET, FILM COATED, EXTENDED RELEASE ORAL DAILY
Qty: 90 TABLET | Refills: 1 | Status: SHIPPED | OUTPATIENT
Start: 2020-09-01 | End: 2020-09-01 | Stop reason: ALTCHOICE

## 2020-09-01 RX ORDER — NYSTATIN 100000 U/G
CREAM TOPICAL
Qty: 30 G | Refills: 1 | Status: SHIPPED | OUTPATIENT
Start: 2020-09-01 | End: 2020-10-01

## 2020-09-01 ASSESSMENT — ENCOUNTER SYMPTOMS
CONSTIPATION: 0
COUGH: 0
COLOR CHANGE: 0
VISUAL CHANGE: 0
STRIDOR: 0
SHORTNESS OF BREATH: 0
BACK PAIN: 0
NAUSEA: 1
SORE THROAT: 0
EYES NEGATIVE: 1
VOMITING: 0
DIARRHEA: 1
ABDOMINAL PAIN: 1
BLURRED VISION: 0

## 2020-09-01 ASSESSMENT — PATIENT HEALTH QUESTIONNAIRE - PHQ9
2. FEELING DOWN, DEPRESSED OR HOPELESS: 0
SUM OF ALL RESPONSES TO PHQ QUESTIONS 1-9: 0
1. LITTLE INTEREST OR PLEASURE IN DOING THINGS: 0
SUM OF ALL RESPONSES TO PHQ9 QUESTIONS 1 & 2: 0
SUM OF ALL RESPONSES TO PHQ QUESTIONS 1-9: 0

## 2020-09-01 NOTE — PROGRESS NOTES
Subjective:     Patient ID: Emma Lew is a 62 y.o. female who presentstoday for:  Chief Complaint   Patient presents with    Pre-op Exam     Pt. is here for medical clearance for Gallbladder removal with Dr. Nahid Glasgow. Pt. states she doesn't have a date scheduled until we clear her. Diabetes   She presents for her follow-up diabetic visit. She has type 2 diabetes mellitus. Her disease course has been stable. There are no hypoglycemic associated symptoms. Pertinent negatives for hypoglycemia include no dizziness or headaches. Associated symptoms include polydipsia and polyuria. Pertinent negatives for diabetes include no blurred vision, no chest pain, no fatigue, no foot paresthesias, no foot ulcerations, no polyphagia, no visual change, no weakness and no weight loss. There are no hypoglycemic complications. Symptoms are stable. Diabetic complications include peripheral neuropathy. Risk factors for coronary artery disease include diabetes mellitus, family history, post-menopausal, hypertension, obesity and dyslipidemia. Current diabetic treatment includes oral agent (monotherapy). She is compliant with treatment some of the time. Her weight is stable. Meal planning includes avoidance of concentrated sweets. She monitors blood glucose at home 1-2 x per week. Blood glucose monitoring compliance is poor. Her home blood glucose trend is fluctuating minimally. She does not see a podiatrist.  Vaginal Itching   The patient's primary symptoms include genital itching. The patient's pertinent negatives include no genital lesions, genital odor, genital rash (irritation), missed menses, pelvic pain, vaginal bleeding or vaginal discharge. This is a new problem. The current episode started in the past 7 days. The problem occurs daily. The problem has been unchanged. The patient is experiencing no pain. Associated symptoms include abdominal pain, diarrhea and nausea.  Pertinent negatives include no anorexia, back pain, chills, constipation, discolored urine, dysuria, fever, flank pain, frequency, headaches, hematuria, joint pain, joint swelling, painful intercourse, sore throat, urgency or vomiting. The symptoms are aggravated by urinating and tactile pressure. She has tried nothing for the symptoms. She is not sexually active. She is postmenopausal.       Past Medical History:   Diagnosis Date    Atrial fibrillation ([facility] [address]     Congenital heart disease     Diabetes mellitus ([facility]     Hyperlipidemia     Hypertension      Current Outpatient Medications on File Prior to Visit   Medication Sig Dispense Refill    ondansetron (ZOFRAN-ODT) 4 MG disintegrating tablet Take 1 tablet by mouth every 8 hours as needed for Nausea or Vomiting 10 tablet 0    pantoprazole (PROTONIX) 40 MG tablet Take 1 tablet by mouth every morning (before breakfast) 30 tablet 0    dilTIAZem (TIAZAC) 240 MG extended release capsule TAKE ONE CAPSULE BY MOUTH EVERY DAY 90 capsule 0    metoprolol tartrate (LOPRESSOR) 25 MG tablet Take 25 mg by mouth daily      cetirizine (ZYRTEC ALLERGY) 10 MG tablet Take 1 tablet by mouth daily 10 tablet 0    vitamin D (ERGOCALCIFEROL) 1.25 MG (84301 UT) CAPS capsule TAKE ONE CAPSULE BY MOUTH ONCE A WEEK 12 capsule 3    celecoxib (CELEBREX) 100 MG capsule TAKE ONE CAPSULE BY MOUTH TWO TIMES A  capsule 3    rosuvastatin (CRESTOR) 20 MG tablet Take 1 tablet by mouth daily 90 tablet 3    cyclobenzaprine (FLEXERIL) 10 MG tablet Take 1 tablet by mouth 3 times daily as needed for Muscle spasms 30 tablet 1    gabapentin (NEURONTIN) 300 MG capsule Take 1 capsule by mouth nightly for 180 days. Intended supply: 90 days 90 capsule 1    blood glucose monitor kit and supplies Test 2 times a day & as needed for symptoms of irregular blood glucose. Lancets and test strips to be included 1 kit 0    aspirin 81 MG tablet Take 81 mg by mouth daily.         nitroGLYCERIN (NITROSTAT) 0.4 MG SL tablet Place 0.4 mg under the tongue every 5 minutes as needed.  acetaminophen (TYLENOL ARTHRITIS PAIN) 650 MG CR tablet Take 650 mg by mouth every 8 hours as needed. No current facility-administered medications on file prior to visit.       Past Surgical History:   Procedure Laterality Date     SECTION      KIDNEY STONE SURGERY          Family History   Problem Relation Age of Onset    Heart Disease Mother     Diabetes Mother     Heart Attack Father     Diabetes Sister     Diabetes Brother     Diabetes Sister     Heart Disease Sister      Social History     Socioeconomic History    Marital status:      Spouse name: Not on file    Number of children: Not on file    Years of education: Not on file    Highest education level: Not on file   Occupational History    Not on file   Social Needs    Financial resource strain: Not on file    Food insecurity     Worry: Not on file     Inability: Not on file    Transportation needs     Medical: Not on file     Non-medical: Not on file   Tobacco Use    Smoking status: Never Smoker    Smokeless tobacco: Never Used   Substance and Sexual Activity    Alcohol use: Never     Frequency: Never    Drug use: No    Sexual activity: Not on file   Lifestyle    Physical activity     Days per week: Not on file     Minutes per session: Not on file    Stress: Not on file   Relationships    Social connections     Talks on phone: Not on file     Gets together: Not on file     Attends Sikh service: Not on file     Active member of club or organization: Not on file     Attends meetings of clubs or organizations: Not on file     Relationship status: Not on file    Intimate partner violence     Fear of current or ex partner: Not on file     Emotionally abused: Not on file     Physically abused: Not on file     Forced sexual activity: Not on file   Other Topics Concern    Not on file   Social History Narrative    Not on file     Allergies:  Patient has no known allergies. Review of Systems   Constitutional: Negative for activity change, appetite change, chills, diaphoresis, fatigue, fever, unexpected weight change and weight loss. HENT: Negative. Negative for sore throat. Eyes: Negative. Negative for blurred vision. Respiratory: Negative for cough, shortness of breath and stridor. Cardiovascular: Negative for chest pain, palpitations and leg swelling. Gastrointestinal: Positive for abdominal pain, diarrhea and nausea. Negative for anorexia, constipation and vomiting. Is supposed to be getting gallbladder removed. Has to call for surgery date   Endocrine: Positive for polydipsia and polyuria. Negative for polyphagia. Genitourinary: Negative. Negative for dysuria, flank pain, frequency, hematuria, missed menses, pelvic pain, urgency and vaginal discharge. Musculoskeletal: Positive for arthralgias. Negative for back pain, joint pain and joint swelling. Skin: Negative for color change and wound. Neurological: Negative for dizziness, syncope, weakness, light-headedness and headaches. Psychiatric/Behavioral: Negative. Objective:    /66 (Site: Left Upper Arm, Position: Sitting, Cuff Size: Medium Adult)   Pulse 77   Temp 97.5 °F (36.4 °C) (Temporal)   Resp 16   Ht 5' (1.524 m)   Wt 167 lb (75.8 kg)   SpO2 99%   BMI 32.61 kg/m²     Physical Exam  Constitutional:       General: She is not in acute distress. Appearance: Normal appearance. She is not ill-appearing, toxic-appearing or diaphoretic. HENT:      Head: Normocephalic and atraumatic. Right Ear: External ear normal.      Left Ear: External ear normal.      Nose: Nose normal.      Mouth/Throat:      Mouth: Mucous membranes are moist.      Pharynx: Oropharynx is clear. Eyes:      Extraocular Movements: Extraocular movements intact. Conjunctiva/sclera: Conjunctivae normal.      Pupils: Pupils are equal, round, and reactive to light.    Neck: Musculoskeletal: Normal range of motion and neck supple. No muscular tenderness. Cardiovascular:      Rate and Rhythm: Normal rate and regular rhythm. Heart sounds: Normal heart sounds. Pulmonary:      Effort: Pulmonary effort is normal. No respiratory distress. Breath sounds: Normal breath sounds. No stridor. No wheezing, rhonchi or rales. Chest:      Chest wall: No tenderness. Abdominal:      General: Bowel sounds are normal.      Palpations: Abdomen is soft. Tenderness: There is generalized abdominal tenderness. There is no guarding or rebound. Musculoskeletal: Normal range of motion. General: No swelling or tenderness. Skin:     General: Skin is warm and dry. Findings: No erythema. Neurological:      General: No focal deficit present. Mental Status: She is alert and oriented to person, place, and time. Mental status is at baseline. Cranial Nerves: No cranial nerve deficit. Motor: No weakness. Gait: Gait normal.   Psychiatric:         Behavior: Behavior normal.         Assessment & Plan:       Diagnosis Orders   1. Type 2 diabetes mellitus with complication, without long-term current use of insulin ([facility])  POCT glycosylated hemoglobin (Hb A1C)    POCT Glucose    SITagliptin-metFORMIN HCl ER (JANUMET XR) 100-1000 MG TB24   2. Essential hypertension  EKG 12 Lead   3. Vaginal yeast infection  nystatin (MYCOSTATIN) 038670 UNIT/GM cream     Orders Placed This Encounter   Procedures    POCT glycosylated hemoglobin (Hb A1C)    POCT Glucose    EKG 12 Lead     Order Specific Question:   Reason for Exam?     Answer:   Pre-op     Orders Placed This Encounter   Medications    nystatin (MYCOSTATIN) 812209 UNIT/GM cream     Sig: Apply topically 2 times daily.      Dispense:  30 g     Refill:  1    SITagliptin-metFORMIN HCl ER (JANUMET XR) 100-1000 MG TB24     Sig: Take 1 tablet by mouth daily     Dispense:  90 tablet     Refill:  1     Medications Discontinued

## 2020-09-01 NOTE — TELEPHONE ENCOUNTER
Patient called because the metformin cost too much. She is requesting a different medication to be sent to [address].   Thank you

## 2020-09-01 NOTE — TELEPHONE ENCOUNTER
I increased dose of original metformin and patient will have to take the medication twice daily.   New script was sent to pharmacy

## 2020-09-02 LAB — URINE CULTURE, ROUTINE: NORMAL

## 2020-09-04 ENCOUNTER — TELEPHONE (OUTPATIENT)
Dept: FAMILY MEDICINE CLINIC | Age: 65
End: 2020-09-04

## 2020-09-15 ENCOUNTER — TELEPHONE (OUTPATIENT)
Dept: WOMENS IMAGING | Age: 65
End: 2020-09-15

## 2020-09-15 RX ORDER — CELECOXIB 100 MG/1
CAPSULE ORAL
Qty: 180 CAPSULE | Refills: 3 | Status: SHIPPED | OUTPATIENT
Start: 2020-09-15 | End: 2020-10-01

## 2020-09-15 NOTE — TELEPHONE ENCOUNTER
Patient states she is currently taking and its to strong for her, she is requesting to go back on to taking it twice a day 500 in the morning and 500 at night. States the 1,000mg twice a day seems to be to strong.        Pharmacy: [email or web address]

## 2020-10-01 ENCOUNTER — APPOINTMENT (OUTPATIENT)
Dept: GENERAL RADIOLOGY | Age: 65
End: 2020-10-01
Payer: COMMERCIAL

## 2020-10-01 ENCOUNTER — HOSPITAL ENCOUNTER (EMERGENCY)
Age: 65
Discharge: HOME | End: 2020-10-01
Payer: COMMERCIAL

## 2020-10-01 ENCOUNTER — APPOINTMENT (OUTPATIENT)
Dept: CT IMAGING | Age: 65
End: 2020-10-01
Payer: COMMERCIAL

## 2020-10-01 VITALS
HEART RATE: 72 BPM | WEIGHT: 164 LBS | OXYGEN SATURATION: 96 % | BODY MASS INDEX: 32.2 KG/M2 | RESPIRATION RATE: 20 BRPM | HEIGHT: 60 IN | SYSTOLIC BLOOD PRESSURE: 145 MMHG | TEMPERATURE: 98.8 F | DIASTOLIC BLOOD PRESSURE: 79 MMHG

## 2020-10-01 LAB
ALBUMIN SERPL-MCNC: 4.2 G/DL (ref 3.5–4.6)
ALP BLD-CCNC: 91 U/L (ref 40–130)
ALT SERPL-CCNC: 16 U/L (ref 0–33)
ANION GAP SERPL CALCULATED.3IONS-SCNC: 12 MEQ/L (ref 9–15)
AST SERPL-CCNC: 20 U/L (ref 0–35)
BACTERIA: NEGATIVE /HPF
BASOPHILS ABSOLUTE: 0.1 K/UL (ref 0–0.2)
BASOPHILS RELATIVE PERCENT: 0.8 %
BILIRUB SERPL-MCNC: 0.5 MG/DL (ref 0.2–0.7)
BILIRUBIN URINE: NEGATIVE
BLOOD, URINE: NEGATIVE
BUN BLDV-MCNC: 16 MG/DL (ref 8–23)
CALCIUM SERPL-MCNC: 9 MG/DL (ref 8.5–9.9)
CHLORIDE BLD-SCNC: 99 MEQ/L (ref 95–107)
CLARITY: CLEAR
CO2: 23 MEQ/L (ref 20–31)
COLOR: YELLOW
CREAT SERPL-MCNC: 0.58 MG/DL (ref 0.5–0.9)
EKG ATRIAL RATE: 75 BPM
EKG P AXIS: 48 DEGREES
EKG P-R INTERVAL: 152 MS
EKG Q-T INTERVAL: 404 MS
EKG QRS DURATION: 86 MS
EKG QTC CALCULATION (BAZETT): 451 MS
EKG R AXIS: -14 DEGREES
EKG T AXIS: 73 DEGREES
EKG VENTRICULAR RATE: 75 BPM
EOSINOPHILS ABSOLUTE: 0 K/UL (ref 0–0.7)
EOSINOPHILS RELATIVE PERCENT: 0.6 %
EPITHELIAL CELLS, UA: NORMAL /HPF (ref 0–5)
GFR AFRICAN AMERICAN: >60
GFR NON-AFRICAN AMERICAN: >60
GLOBULIN: 2.1 G/DL (ref 2.3–3.5)
GLUCOSE BLD-MCNC: 201 MG/DL (ref 60–115)
GLUCOSE BLD-MCNC: 230 MG/DL (ref 60–115)
GLUCOSE BLD-MCNC: 253 MG/DL (ref 70–99)
GLUCOSE URINE: 100 MG/DL
HCT VFR BLD CALC: 42.3 % (ref 37–47)
HEMOGLOBIN: 14.5 G/DL (ref 12–16)
HYALINE CASTS: NORMAL /HPF (ref 0–5)
KETONES, URINE: NEGATIVE MG/DL
LEUKOCYTE ESTERASE, URINE: ABNORMAL
LIPASE: 21 U/L (ref 12–95)
LYMPHOCYTES ABSOLUTE: 1.7 K/UL (ref 1–4.8)
LYMPHOCYTES RELATIVE PERCENT: 23.2 %
MAGNESIUM: 1.8 MG/DL (ref 1.7–2.4)
MCH RBC QN AUTO: 31.1 PG (ref 27–31.3)
MCHC RBC AUTO-ENTMCNC: 34.2 % (ref 33–37)
MCV RBC AUTO: 91 FL (ref 82–100)
MONOCYTES ABSOLUTE: 0.4 K/UL (ref 0.2–0.8)
MONOCYTES RELATIVE PERCENT: 5.8 %
NEUTROPHILS ABSOLUTE: 5.2 K/UL (ref 1.4–6.5)
NEUTROPHILS RELATIVE PERCENT: 69.6 %
NITRITE, URINE: NEGATIVE
PDW BLD-RTO: 12.1 % (ref 11.5–14.5)
PERFORMED ON: ABNORMAL
PERFORMED ON: ABNORMAL
PH UA: 5.5 (ref 5–9)
PLATELET # BLD: 220 K/UL (ref 130–400)
POTASSIUM SERPL-SCNC: 3.8 MEQ/L (ref 3.4–4.9)
PROTEIN UA: NEGATIVE MG/DL
RBC # BLD: 4.65 M/UL (ref 4.2–5.4)
RBC UA: NORMAL /HPF (ref 0–5)
SODIUM BLD-SCNC: 134 MEQ/L (ref 135–144)
SPECIFIC GRAVITY UA: 1.01 (ref 1–1.03)
TOTAL PROTEIN: 6.3 G/DL (ref 6.3–8)
TROPONIN: <0.01 NG/ML (ref 0–0.01)
URINE REFLEX TO CULTURE: ABNORMAL
UROBILINOGEN, URINE: 0.2 E.U./DL
WBC # BLD: 7.5 K/UL (ref 4.8–10.8)
WBC UA: NORMAL /HPF (ref 0–5)

## 2020-10-01 PROCEDURE — 6360000002 HC RX W HCPCS

## 2020-10-01 PROCEDURE — 83690 ASSAY OF LIPASE: CPT

## 2020-10-01 PROCEDURE — 80053 COMPREHEN METABOLIC PANEL: CPT

## 2020-10-01 PROCEDURE — 74177 CT ABD & PELVIS W/CONTRAST: CPT

## 2020-10-01 PROCEDURE — 96374 THER/PROPH/DIAG INJ IV PUSH: CPT

## 2020-10-01 PROCEDURE — 81001 URINALYSIS AUTO W/SCOPE: CPT

## 2020-10-01 PROCEDURE — 36415 COLL VENOUS BLD VENIPUNCTURE: CPT

## 2020-10-01 PROCEDURE — 99285 EMERGENCY DEPT VISIT HI MDM: CPT

## 2020-10-01 PROCEDURE — 6360000004 HC RX CONTRAST MEDICATION: Performed by: STUDENT IN AN ORGANIZED HEALTH CARE EDUCATION/TRAINING PROGRAM

## 2020-10-01 PROCEDURE — 84484 ASSAY OF TROPONIN QUANT: CPT

## 2020-10-01 PROCEDURE — 2580000003 HC RX 258: Performed by: STUDENT IN AN ORGANIZED HEALTH CARE EDUCATION/TRAINING PROGRAM

## 2020-10-01 PROCEDURE — 99284 EMERGENCY DEPT VISIT MOD MDM: CPT

## 2020-10-01 PROCEDURE — 6360000002 HC RX W HCPCS: Performed by: STUDENT IN AN ORGANIZED HEALTH CARE EDUCATION/TRAINING PROGRAM

## 2020-10-01 PROCEDURE — 70450 CT HEAD/BRAIN W/O DYE: CPT

## 2020-10-01 PROCEDURE — 93005 ELECTROCARDIOGRAM TRACING: CPT | Performed by: STUDENT IN AN ORGANIZED HEALTH CARE EDUCATION/TRAINING PROGRAM

## 2020-10-01 PROCEDURE — 71045 X-RAY EXAM CHEST 1 VIEW: CPT

## 2020-10-01 PROCEDURE — 85025 COMPLETE CBC W/AUTO DIFF WBC: CPT

## 2020-10-01 PROCEDURE — 83735 ASSAY OF MAGNESIUM: CPT

## 2020-10-01 RX ORDER — ONDANSETRON 2 MG/ML
INJECTION INTRAMUSCULAR; INTRAVENOUS
Status: COMPLETED
Start: 2020-10-01 | End: 2020-10-01

## 2020-10-01 RX ORDER — MORPHINE SULFATE 2 MG/ML
4 INJECTION, SOLUTION INTRAMUSCULAR; INTRAVENOUS
Status: DISCONTINUED | OUTPATIENT
Start: 2020-10-01 | End: 2020-10-02 | Stop reason: HOSPADM

## 2020-10-01 RX ORDER — ONDANSETRON 4 MG/1
4 TABLET, FILM COATED ORAL 3 TIMES DAILY PRN
Qty: 15 TABLET | Refills: 0 | Status: SHIPPED | OUTPATIENT
Start: 2020-10-01 | End: 2021-12-14

## 2020-10-01 RX ORDER — ONDANSETRON 2 MG/ML
4 INJECTION INTRAMUSCULAR; INTRAVENOUS EVERY 30 MIN PRN
Status: DISCONTINUED | OUTPATIENT
Start: 2020-10-01 | End: 2020-10-02 | Stop reason: HOSPADM

## 2020-10-01 RX ORDER — KETOROLAC TROMETHAMINE 30 MG/ML
30 INJECTION, SOLUTION INTRAMUSCULAR; INTRAVENOUS ONCE
Status: COMPLETED | OUTPATIENT
Start: 2020-10-01 | End: 2020-10-01

## 2020-10-01 RX ORDER — 0.9 % SODIUM CHLORIDE 0.9 %
1000 INTRAVENOUS SOLUTION INTRAVENOUS ONCE
Status: COMPLETED | OUTPATIENT
Start: 2020-10-01 | End: 2020-10-01

## 2020-10-01 RX ADMIN — IOPAMIDOL 100 ML: 612 INJECTION, SOLUTION INTRAVENOUS at 19:42

## 2020-10-01 RX ADMIN — SODIUM CHLORIDE 1000 ML: 9 INJECTION, SOLUTION INTRAVENOUS at 20:01

## 2020-10-01 RX ADMIN — ONDANSETRON 4 MG: 2 INJECTION INTRAMUSCULAR; INTRAVENOUS at 18:04

## 2020-10-01 RX ADMIN — KETOROLAC TROMETHAMINE 30 MG: 30 INJECTION, SOLUTION INTRAMUSCULAR; INTRAVENOUS at 20:01

## 2020-10-01 ASSESSMENT — PAIN SCALES - GENERAL
PAINLEVEL_OUTOF10: 7
PAINLEVEL_OUTOF10: 9
PAINLEVEL_OUTOF10: 9
PAINLEVEL_OUTOF10: 7
PAINLEVEL_OUTOF10: 9
PAINLEVEL_OUTOF10: 8

## 2020-10-01 ASSESSMENT — PAIN DESCRIPTION - LOCATION
LOCATION: ABDOMEN

## 2020-10-01 ASSESSMENT — ENCOUNTER SYMPTOMS
PHOTOPHOBIA: 0
ABDOMINAL PAIN: 1
NAUSEA: 1
SHORTNESS OF BREATH: 0
VOMITING: 0
COUGH: 0
WHEEZING: 0
CONSTIPATION: 0
ABDOMINAL DISTENTION: 0
DIARRHEA: 0
BLOOD IN STOOL: 0

## 2020-10-01 ASSESSMENT — PAIN DESCRIPTION - ORIENTATION: ORIENTATION: RIGHT;UPPER

## 2020-10-01 ASSESSMENT — PAIN DESCRIPTION - FREQUENCY
FREQUENCY: CONTINUOUS
FREQUENCY: CONTINUOUS

## 2020-10-01 ASSESSMENT — PAIN DESCRIPTION - PAIN TYPE: TYPE: ACUTE PAIN

## 2020-10-01 ASSESSMENT — PAIN DESCRIPTION - DESCRIPTORS: DESCRIPTORS: PRESSURE

## 2020-10-01 NOTE — ED PROVIDER NOTES
[facility] ED  EMERGENCY DEPARTMENT ENCOUNTER      Pt Name: Sydney Gonzales  MRN: [id number]  [place] 1955  Date of evaluation: 10/1/2020  Provider: Miguel Correa PA-C    CHIEF COMPLAINT       Chief Complaint   Patient presents with    Abdominal Pain         HISTORY OF PRESENT ILLNESS   (Location/Symptom, Timing/Onset, Context/Setting, Quality, Duration, Modifying Factors, Severity)  Note limiting factors. Sydney Gonzales is a 62 y.o. female who per chart is a past medical history of hypertension CAD hld A. fib on ASA varicose veins presents to the emergency department with gradual onset, constant, moderate, worsening epigastric abdominal pain. Pt states she was supposed to have a cholecystectomy 3 months ago but was unable to due to uncontrolled HTN and hyperglycemia. She states her current pain feels similar to the pain she experiences with biliary colic however now constant and worse. + nausea. Pt reports dizziness all day. She was walking through the halls earlier today at work and felt like she was going to pass out. She also reports some mild, substernal, intermittent, non radiating chest pain but she states she cannot tell the difference between her gallbladder pain and chest pain because they often feel similar. She denies cough, sob, fever, chills, abd distention, diarrhea, hemoptysis, urinary sx, vomiting. HPI    Nursing Notes were reviewed. REVIEW OF SYSTEMS    (2-9 systems for level 4, 10 or more for level 5)     Review of Systems   Constitutional: Negative for chills and fever. HENT: Negative for congestion. Eyes: Negative for photophobia. Respiratory: Negative for cough, shortness of breath and wheezing. Cardiovascular: Positive for chest pain. Negative for palpitations. Gastrointestinal: Positive for abdominal pain and nausea. Negative for abdominal distention, blood in stool, constipation, diarrhea and vomiting.    Genitourinary: Negative for dysuria, frequency and hematuria. Musculoskeletal: Negative for myalgias. Allergic/Immunologic: Negative for immunocompromised state. Neurological: Positive for dizziness. Negative for tremors, seizures, syncope, speech difficulty, weakness, numbness and headaches. All other systems reviewed and are negative. Except as noted above the remainder of the review of systems was reviewed and negative. PAST MEDICAL HISTORY     Past Medical History:   Diagnosis Date    Atrial fibrillation ([facility] [address]     Congenital heart disease     Diabetes mellitus ([facility] [address]     Hyperlipidemia     Hypertension          SURGICAL HISTORY       Past Surgical History:   Procedure Laterality Date     SECTION      KIDNEY STONE SURGERY           CURRENT MEDICATIONS       Previous Medications    ACETAMINOPHEN (TYLENOL ARTHRITIS PAIN) 650 MG CR TABLET    Take 650 mg by mouth every 8 hours as needed. ASPIRIN 81 MG TABLET    Take 81 mg by mouth daily. BLOOD GLUCOSE MONITOR KIT AND SUPPLIES    Test 2 times a day & as needed for symptoms of irregular blood glucose. Lancets and test strips to be included    CETIRIZINE (ZYRTEC ALLERGY) 10 MG TABLET    Take 1 tablet by mouth daily    DILTIAZEM (TIAZAC) 240 MG EXTENDED RELEASE CAPSULE    TAKE ONE CAPSULE BY MOUTH EVERY DAY    METFORMIN (GLUCOPHAGE) 500 MG TABLET    Take 1 tablet by mouth 2 times daily (with meals)    METOPROLOL TARTRATE (LOPRESSOR) 25 MG TABLET    Take 25 mg by mouth daily    NITROGLYCERIN (NITROSTAT) 0.4 MG SL TABLET    Place 0.4 mg under the tongue every 5 minutes as needed. ROSUVASTATIN (CRESTOR) 20 MG TABLET    Take 1 tablet by mouth daily    VITAMIN D (ERGOCALCIFEROL) 1.25 MG (33903 UT) CAPS CAPSULE    TAKE ONE CAPSULE BY MOUTH ONCE A WEEK       ALLERGIES     Patient has no known allergies.     FAMILY HISTORY       Family History   Problem Relation Age of Onset    Heart Disease Mother     Diabetes Mother     Heart Attack Father     Diabetes Sister    [facility] SpO2 Height Weight   10/01/20 1714 10/01/20 1725 10/01/20 1725 10/01/20 1714 10/01/20 1714 10/01/20 1714 10/01/20 1725 10/01/20 1725   137/66 98.8 °F (37.1 °C) Oral 84 20 96 % 5' (1.524 m) 164 lb (74.4 kg)       Physical Exam  Constitutional:       General: She is not in acute distress. Appearance: She is well-developed. She is not ill-appearing, toxic-appearing or diaphoretic. HENT:      Head: Normocephalic and atraumatic. Nose: Nose normal.      Mouth/Throat:      Mouth: Mucous membranes are moist.   Eyes:      Pupils: Pupils are equal, round, and reactive to light. Neck:      Musculoskeletal: Normal range of motion. Cardiovascular:      Rate and Rhythm: Normal rate and regular rhythm. Pulses: Normal pulses. Heart sounds: No murmur. No friction rub. No gallop. Pulmonary:      Effort: Pulmonary effort is normal.      Breath sounds: Normal breath sounds. No wheezing, rhonchi or rales. Abdominal:      General: Bowel sounds are normal. There is no distension. Palpations: Abdomen is soft. There is no mass. Tenderness: There is abdominal tenderness (epigastric). There is no right CVA tenderness, left CVA tenderness, guarding or rebound. Hernia: No hernia is present. Musculoskeletal:         General: No swelling. Right lower leg: No edema. Left lower leg: No edema. Skin:     General: Skin is warm and dry. Capillary Refill: Capillary refill takes less than 2 seconds. Neurological:      General: No focal deficit present. Mental Status: She is alert and oriented to person, place, and time. DIAGNOSTIC RESULTS     EKG: All EKG's are interpreted by the Emergency Department Physician who either signs or Co-signs this chart in the absence of a cardiologist.    EKG shows NSR with HR 74 bpm, normal axis, normal intervals, no ST changes.         RADIOLOGY:   Non-plain film images such as CT, Ultrasound and MRI are read by the radiologist. Jana Holloway radiographic images are visualized and preliminarily interpreted by the emergency physician with the below findings:        Interpretation per the Radiologist below, if available at the time of this note:    CT Head WO Contrast   Final Result   NO ACUTE INTRACRANIAL PATHOLOGY. XR CHEST PORTABLE    (Results Pending)   CT ABDOMEN PELVIS W IV CONTRAST Additional Contrast? None    (Results Pending)         ED BEDSIDE ULTRASOUND:   Performed by ED Physician - none    LABS:  Labs Reviewed   COMPREHENSIVE METABOLIC PANEL - Abnormal; Notable for the following components:       Result Value    Sodium 134 (*)     Glucose 253 (*)     Globulin 2.1 (*)     All other components within normal limits   URINE RT REFLEX TO CULTURE - Abnormal; Notable for the following components:    Glucose, Ur 100 (*)     Leukocyte Esterase, Urine SMALL (*)     All other components within normal limits   POCT GLUCOSE - Abnormal; Notable for the following components:    POC Glucose 201 (*)     All other components within normal limits   POCT GLUCOSE - Abnormal; Notable for the following components:    POC Glucose 230 (*)     All other components within normal limits   CBC WITH AUTO DIFFERENTIAL   LIPASE   MAGNESIUM   TROPONIN   MICROSCOPIC URINALYSIS       All other labs were within normal range or not returned as of this dictation. EMERGENCY DEPARTMENT COURSE and DIFFERENTIAL DIAGNOSIS/MDM:   Vitals:    Vitals:    10/01/20 1725 10/01/20 2000 10/01/20 2010 10/01/20 2120   BP: 137/66 134/82 136/73 (!) 145/79   Pulse: 85 73 75 72   Resp: 18 17 16 20   Temp: 98.8 °F (37.1 °C)      TempSrc: Oral      SpO2: 96% 96% 96% 96%   Weight: 164 lb (74.4 kg)      Height: 5' (1.524 m)        This is a patient that was signed out to me by the daytime PA-C. On reassessment patient's pain is better controlled. Patient no longer vomiting or nauseous. Work-up was benign for any cardiac issues.   EKG was negative, there was no acute cardiopulmonary process on chest x-ray. I believe patient's symptoms are secondary to her gallbladder for which she was supposed to have taken out recently. I explained all the findings to the patient. Patient will be discharged home and in stable condition with medication for nausea close follow-up with her surgeon. Patient's questions all answered. Patient understands and is agreeable this plan. MDM      REASSESSMENT              CONSULTS:  None    PROCEDURES:  Unless otherwise noted below, none     Procedures        FINAL IMPRESSION      1. Abdominal pain, epigastric    2. Bilious vomiting with nausea          DISPOSITION/PLAN   DISPOSITION Decision To Discharge 10/01/2020 10:32:23 PM      PATIENT REFERRED TO:  Anselmo Sebastian MD  [address] [place] [address]  [phone]    Call in 2 days  As needed      DISCHARGE MEDICATIONS:  New Prescriptions    ONDANSETRON (ZOFRAN) 4 MG TABLET    Take 1 tablet by mouth 3 times daily as needed for Nausea or Vomiting     Controlled Substances Monitoring:     No flowsheet data found.     (Please note that portions of this note were completed with a voice recognition program.  Efforts were made to edit the dictations but occasionally words are mis-transcribed.)    Eduin Hendricks PA-C (electronically signed)             Eduin Hendricks PA-C  10/01/20 [id number]

## 2020-10-01 NOTE — ED NOTES
Check patient's blg at patient's request.  Bgl is 230. Patient ambulated to restroom via self with steady gait no distress noted.       Edin Labor  10/01/20 1955

## 2020-10-01 NOTE — ED TRIAGE NOTES
Patient was due to have her Gall Bladder out yesterday but was cancelled due to sugars and HTN.  C/O RUQ pain

## 2020-10-02 ENCOUNTER — VIRTUAL VISIT (OUTPATIENT)
Dept: FAMILY MEDICINE CLINIC | Age: 65
End: 2020-10-02
Payer: COMMERCIAL

## 2020-10-02 PROCEDURE — 1111F DSCHRG MED/CURRENT MED MERGE: CPT | Performed by: NURSE PRACTITIONER

## 2020-10-02 PROCEDURE — 99214 OFFICE O/P EST MOD 30 MIN: CPT | Performed by: NURSE PRACTITIONER

## 2020-10-02 RX ORDER — GLIPIZIDE 5 MG/1
5 TABLET, FILM COATED, EXTENDED RELEASE ORAL DAILY
Qty: 30 TABLET | Refills: 3 | Status: SHIPPED | OUTPATIENT
Start: 2020-10-02 | End: 2020-10-14 | Stop reason: SDUPTHER

## 2020-10-02 NOTE — PROGRESS NOTES
TELEHEALTH EVALUATION -- Audio/Visual (During [facility]51 public health emergency)    -   Kenroy Ocasio is a 62 y.o. female being evaluated by a Virtual Visit (video visit) encounter to address concerns as mentioned above. A caregiver was present when appropriate. Due to this being a TeleHealth encounter ([facility]), evaluation of the following organ systems was limited: Vitals/Constitutional/EENT/Resp/CV/GI//MS/Neuro/Skin/Heme-Lymph-Imm. Pursuant to the emergency declaration under the [address] [place] [address], [address] [place] [address] authority and the [employer] Act, this Virtual Visit was conducted with patient's (and/or legal guardian's) consent, to reduce the patient's risk of exposure to COVID-19 and provide necessary medical care. The patient (and/or legal guardian) has also been advised to contact this office for worsening conditions or problems, and seek emergency medical treatment and/or call 911 if deemed necessary. Services were provided through a video synchronous discussion virtually to substitute for in-person clinic visit. Type of encounter was _x_ Doxy __ MyChart ___Facetime    Patient was located at their home. Provider was located at their __x_ home or        ____ office. --EJ Ghosh - CNP on 10/7/2020 at 9:24 AM    An electronic signature was used to authenticate this note. Post-Discharge Transitional Care Management Services or Hospital Follow Up      Kenroy Ocasio   YOB: 1955    Date of Office Visit:  10/2/2020  Date of Hospital Admission: 10/1/20  Date of Hospital Discharge: 10/1/20  Readmission Risk Score(high >=14%.  Medium >=10%):No data recorded    Care management risk score Rising risk (score 2-5) and Complex Care (Scores >=6): 1     Non face to face  following discharge, date last encounter closed (first attempt may have been earlier): *No documented post hospital discharge outreach found in the last 14 days *No documented post hospital discharge outreach found in the last 14 days    Call initiated 2 business days of discharge: *No response recorded in the last 14 days     Patient Active Problem List   Diagnosis    Hypercholesteremia    HTN (hypertension)    Coronary atherosclerosis    Other chest pain    Cyst of kidney, acquired    Effusion, left ankle    Effusion, right ankle    Frequency of micturition    Long term (current) use of aspirin    Painful micturition, unspecified    Personal history of urinary calculi    Personal history of urinary (tract) infections    Varicose veins of bilateral lower extremities with pain    Gallbladder disease       No Known Allergies    Medications listed as ordered at the time of discharge from [facility] Medication Instructions EZIO:    Printed on:10/07/20 0869   Medication Information                      acetaminophen (TYLENOL ARTHRITIS PAIN) 650 MG CR tablet  Take 650 mg by mouth every 8 hours as needed. aspirin 81 MG tablet  Take 81 mg by mouth daily. blood glucose monitor kit and supplies  Test 2 times a day & as needed for symptoms of irregular blood glucose. Lancets and test strips to be included             cetirizine (ZYRTEC ALLERGY) 10 MG tablet  Take 1 tablet by mouth daily             dilTIAZem (TIAZAC) 240 MG extended release capsule  TAKE ONE CAPSULE BY MOUTH EVERY DAY             glipiZIDE (GLUCOTROL XL) 5 MG extended release tablet  Take 1 tablet by mouth daily             metFORMIN (GLUCOPHAGE) 500 MG tablet  Take 1 tablet by mouth 2 times daily (with meals)             metoprolol tartrate (LOPRESSOR) 25 MG tablet  Take 25 mg by mouth daily             nitroGLYCERIN (NITROSTAT) 0.4 MG SL tablet  Place 0.4 mg under the tongue every 5 minutes as needed.                ondansetron (ZOFRAN) 4 MG tablet  Take 1 tablet by mouth 3 times daily as needed for Nausea or Vomiting             rosuvastatin (CRESTOR) 20 MG tablet  Take 1 tablet by mouth daily             vitamin D (ERGOCALCIFEROL) 1.25 MG (60227 UT) CAPS capsule  TAKE ONE CAPSULE BY MOUTH ONCE A WEEK                   Medications marked \"taking\" at this time  Outpatient Medications Marked as Taking for the 10/2/20 encounter (Virtual Visit) with EJ Sage CNP   Medication Sig Dispense Refill    glipiZIDE (GLUCOTROL XL) 5 MG extended release tablet Take 1 tablet by mouth daily 30 tablet 3        Medications patient taking as of now reconciled against medications ordered at time of hospital discharge: Yes    Chief Complaint   Patient presents with   [address] from Hospital       HPI    Inpatient course: Discharge summary reviewed- see chart. Interval history/Current status: stable    Review of Systems   Constitutional: Negative for activity change, appetite change, chills, diaphoresis, fatigue, fever and unexpected weight change. HENT: Negative. Negative for sore throat. Eyes: Negative. Respiratory: Negative for cough, shortness of breath and stridor. Cardiovascular: Negative for chest pain, palpitations and leg swelling. Gastrointestinal: Positive for abdominal pain, diarrhea and nausea. Negative for constipation and vomiting. Is supposed to be getting gallbladder removed. Has to call for surgery date   Endocrine: Positive for polydipsia and polyuria. Negative for polyphagia. Genitourinary: Negative. Negative for dysuria, flank pain, frequency, hematuria, pelvic pain, urgency and vaginal discharge. Musculoskeletal: Positive for arthralgias. Negative for back pain and joint swelling. Skin: Negative for color change and wound. Neurological: Negative for dizziness, syncope, weakness, light-headedness and headaches. Psychiatric/Behavioral: Negative. There were no vitals filed for this visit. There is no height or weight on file to calculate BMI.    Wt Readings from Last 3 Encounters:   10/01/20 164 lb (74.4 kg)   09/01/20 167 lb (75.8 kg)   08/30/20 160 lb (72.6 kg)     BP Readings from Last 3 Encounters:   10/01/20 (!) 145/79   09/01/20 114/66   08/30/20 108/70       Physical Exam  Constitutional:       Appearance: She is not toxic-appearing. Neurological:      General: No focal deficit present. Mental Status: She is alert and oriented to person, place, and time. Assessment/Plan:  1. Type 2 diabetes mellitus with complication, without long-term current use of insulin (HCC)    - glipiZIDE (GLUCOTROL XL) 5 MG extended release tablet; Take 1 tablet by mouth daily  Dispense: 30 tablet; Refill: 3    2.  Gallbladder disease    - [place] DISCHARGE MEDS RECONCILED W/ CURRENT OUTPATIENT MED LIST

## 2020-10-02 NOTE — ED NOTES
Patient offered Morphine if she could find a ride.  Patient refuses     Sammy Godoy RN  10/01/20 2880

## 2020-10-03 PROCEDURE — 93010 ELECTROCARDIOGRAM REPORT: CPT | Performed by: INTERNAL MEDICINE

## 2020-10-07 ASSESSMENT — ENCOUNTER SYMPTOMS
NAUSEA: 1
ABDOMINAL PAIN: 1
CONSTIPATION: 0
VOMITING: 0
BACK PAIN: 0
SORE THROAT: 0
COLOR CHANGE: 0
DIARRHEA: 1
EYES NEGATIVE: 1
COUGH: 0
STRIDOR: 0
SHORTNESS OF BREATH: 0

## 2020-10-14 ENCOUNTER — OFFICE VISIT (OUTPATIENT)
Dept: FAMILY MEDICINE CLINIC | Age: 65
End: 2020-10-14
Payer: COMMERCIAL

## 2020-10-14 VITALS
HEART RATE: 74 BPM | DIASTOLIC BLOOD PRESSURE: 68 MMHG | HEIGHT: 60 IN | WEIGHT: 161 LBS | BODY MASS INDEX: 31.61 KG/M2 | TEMPERATURE: 98.4 F | SYSTOLIC BLOOD PRESSURE: 134 MMHG | OXYGEN SATURATION: 98 %

## 2020-10-14 DIAGNOSIS — E11.8 TYPE 2 DIABETES MELLITUS WITH COMPLICATION, WITHOUT LONG-TERM CURRENT USE OF INSULIN (HCC): ICD-10-CM

## 2020-10-14 LAB
CREATININE URINE: 59.1 MG/DL
HBA1C MFR BLD: 8.9 %
MICROALBUMIN UR-MCNC: <1.2 MG/DL
MICROALBUMIN/CREAT UR-RTO: NORMAL MG/G (ref 0–30)

## 2020-10-14 PROCEDURE — 99213 OFFICE O/P EST LOW 20 MIN: CPT | Performed by: FAMILY MEDICINE

## 2020-10-14 PROCEDURE — 83036 HEMOGLOBIN GLYCOSYLATED A1C: CPT | Performed by: FAMILY MEDICINE

## 2020-10-14 RX ORDER — ROSUVASTATIN CALCIUM 20 MG/1
20 TABLET, COATED ORAL DAILY
Qty: 90 TABLET | Refills: 3 | Status: SHIPPED | OUTPATIENT
Start: 2020-10-14

## 2020-10-14 RX ORDER — GLUCOSAMINE HCL/CHONDROITIN SU 500-400 MG
CAPSULE ORAL
Qty: 100 STRIP | Refills: 3 | Status: SHIPPED | OUTPATIENT
Start: 2020-10-14

## 2020-10-14 RX ORDER — ERGOCALCIFEROL 1.25 MG/1
CAPSULE ORAL
Qty: 12 CAPSULE | Refills: 3 | Status: SHIPPED | OUTPATIENT
Start: 2020-10-14 | End: 2022-08-01 | Stop reason: ALTCHOICE

## 2020-10-14 RX ORDER — GLIPIZIDE 5 MG/1
5 TABLET, FILM COATED, EXTENDED RELEASE ORAL 2 TIMES DAILY
Qty: 60 TABLET | Refills: 5 | Status: SHIPPED | OUTPATIENT
Start: 2020-10-14 | End: 2021-04-16

## 2020-10-14 SDOH — ECONOMIC STABILITY: INCOME INSECURITY: HOW HARD IS IT FOR YOU TO PAY FOR THE VERY BASICS LIKE FOOD, HOUSING, MEDICAL CARE, AND HEATING?: NOT HARD AT ALL

## 2020-10-14 SDOH — ECONOMIC STABILITY: FOOD INSECURITY: WITHIN THE PAST 12 MONTHS, THE FOOD YOU BOUGHT JUST DIDN'T LAST AND YOU DIDN'T HAVE MONEY TO GET MORE.: NEVER TRUE

## 2020-10-14 SDOH — ECONOMIC STABILITY: TRANSPORTATION INSECURITY
IN THE PAST 12 MONTHS, HAS LACK OF TRANSPORTATION KEPT YOU FROM MEETINGS, WORK, OR FROM GETTING THINGS NEEDED FOR DAILY LIVING?: NO

## 2020-10-14 SDOH — ECONOMIC STABILITY: TRANSPORTATION INSECURITY
IN THE PAST 12 MONTHS, HAS THE LACK OF TRANSPORTATION KEPT YOU FROM MEDICAL APPOINTMENTS OR FROM GETTING MEDICATIONS?: NO

## 2020-10-14 SDOH — ECONOMIC STABILITY: FOOD INSECURITY: WITHIN THE PAST 12 MONTHS, YOU WORRIED THAT YOUR FOOD WOULD RUN OUT BEFORE YOU GOT MONEY TO BUY MORE.: NEVER TRUE

## 2020-10-14 ASSESSMENT — ENCOUNTER SYMPTOMS
RHINORRHEA: 0
EYES NEGATIVE: 1
COUGH: 0
CHEST TIGHTNESS: 0
RESPIRATORY NEGATIVE: 1
GASTROINTESTINAL NEGATIVE: 1

## 2020-10-14 NOTE — PROGRESS NOTES
Relation Age of Onset    Heart Disease Mother     Diabetes Mother     Heart Attack Father     Diabetes Sister     Diabetes Brother     Diabetes Sister     Heart Disease Sister      Social History     Socioeconomic History    Marital status:      Spouse name: None    Number of children: None    Years of education: None    Highest education level: None   Occupational History    None   Social Needs    Financial resource strain: Not hard at all   [place]rez insecurity     Worry: Never true     Inability: Never true    Transportation needs     Medical: No     Non-medical: No   Tobacco Use    Smoking status: Never Smoker    Smokeless tobacco: Never Used   Substance and Sexual Activity    Alcohol use: Never     Frequency: Never    Drug use: No    Sexual activity: None   Lifestyle    Physical activity     Days per week: None     Minutes per session: None    Stress: None   Relationships    Social connections     Talks on phone: None     Gets together: None     Attends Jewish service: None     Active member of club or organization: None     Attends meetings of clubs or organizations: None     Relationship status: None    Intimate partner violence     Fear of current or ex partner: None     Emotionally abused: None     Physically abused: None     Forced sexual activity: None   Other Topics Concern    None   Social History Narrative    None     Current Outpatient Medications   Medication Sig Dispense Refill    rosuvastatin (CRESTOR) 20 MG tablet Take 1 tablet by mouth daily 90 tablet 3    vitamin D (ERGOCALCIFEROL) 1.25 MG (04178 UT) CAPS capsule TAKE ONE CAPSULE BY MOUTH ONCE A WEEK 12 capsule 3    Blood Glucose Monitoring Suppl (ACURA BLOOD GLUCOSE METER) w/Device KIT 1 Device by Does not apply route 2 times daily 1 kit 0    blood glucose monitor strips Test 1 times a day & as needed for symptoms of irregular blood glucose.  Dispense sufficient amount for indicated testing frequency plus additional to accommodate PRN testing needs. 100 strip 3    glipiZIDE (GLUCOTROL XL) 5 MG extended release tablet Take 1 tablet by mouth 2 times daily 60 tablet 5    ondansetron (ZOFRAN) 4 MG tablet Take 1 tablet by mouth 3 times daily as needed for Nausea or Vomiting 15 tablet 0    dilTIAZem (TIAZAC) 240 MG extended release capsule TAKE ONE CAPSULE BY MOUTH EVERY DAY 90 capsule 0    metoprolol tartrate (LOPRESSOR) 25 MG tablet Take 25 mg by mouth daily      aspirin 81 MG tablet Take 81 mg by mouth daily.  nitroGLYCERIN (NITROSTAT) 0.4 MG SL tablet Place 0.4 mg under the tongue every 5 minutes as needed.  acetaminophen (TYLENOL ARTHRITIS PAIN) 650 MG CR tablet Take 650 mg by mouth every 8 hours as needed.  blood glucose monitor kit and supplies Test 2 times a day & as needed for symptoms of irregular blood glucose. Lancets and test strips to be included 1 kit 0     No current facility-administered medications for this visit. Current Outpatient Medications on File Prior to Visit   Medication Sig Dispense Refill    ondansetron (ZOFRAN) 4 MG tablet Take 1 tablet by mouth 3 times daily as needed for Nausea or Vomiting 15 tablet 0    dilTIAZem (TIAZAC) 240 MG extended release capsule TAKE ONE CAPSULE BY MOUTH EVERY DAY 90 capsule 0    metoprolol tartrate (LOPRESSOR) 25 MG tablet Take 25 mg by mouth daily      aspirin 81 MG tablet Take 81 mg by mouth daily.  nitroGLYCERIN (NITROSTAT) 0.4 MG SL tablet Place 0.4 mg under the tongue every 5 minutes as needed.  acetaminophen (TYLENOL ARTHRITIS PAIN) 650 MG CR tablet Take 650 mg by mouth every 8 hours as needed.  blood glucose monitor kit and supplies Test 2 times a day & as needed for symptoms of irregular blood glucose. Lancets and test strips to be included 1 kit 0     No current facility-administered medications on file prior to visit.       No Known Allergies  Health Maintenance   Topic Date Due    Diabetic foot exam  11/05/1966    Breast cancer screen  11/05/2006    Shingles Vaccine (1 of 2) 11/05/2006    Colon cancer screen colonoscopy  11/05/2006    Diabetic microalbuminuria test  08/31/2019    Lipid screen  08/31/2019    Flu vaccine (1) 09/01/2020    Pneumococcal 0-64 years Vaccine (1 of 1 - PPSV23) 10/01/2021 (Originally 11/5/1962)    Diabetic retinal exam  10/14/2021 (Originally 11/5/1966)    DTaP/Tdap/Td vaccine (1 - Tdap) 10/14/2021 (Originally 11/5/1975)    Cervical cancer screen  10/14/2021 (Originally 11/5/1977)    A1C test (Diabetic or Prediabetic)  12/01/2020    Hepatitis C screen  Completed    HIV screen  Completed    Hepatitis A vaccine  Aged Out    Hib vaccine  Aged Out    Meningococcal (ACWY) vaccine  Aged Out       Review of Systems     Review of Systems   Constitutional: Negative for activity change, appetite change, fatigue and fever. HENT: Negative for congestion and rhinorrhea. Eyes: Negative. Respiratory: Negative. Negative for cough and chest tightness. Cardiovascular: Negative. Gastrointestinal: Negative. Endocrine: Negative. Genitourinary: Negative. Musculoskeletal: Negative. Skin: Negative. Neurological: Negative for dizziness, light-headedness and numbness. Hematological: Negative. Psychiatric/Behavioral: Negative. Physical Exam  Vitals:    10/14/20 1025   BP: 134/68   Site: Left Upper Arm   Position: Sitting   Cuff Size: Medium Adult   Pulse: 74   Temp: 98.4 °F (36.9 °C)   TempSrc: Oral   SpO2: 98%   Weight: 161 lb (73 kg)   Height: 5' (1.524 m)       Physical Exam  Constitutional:       Appearance: She is well-developed. HENT:      Right Ear: External ear normal.      Left Ear: External ear normal.   Eyes:      Pupils: Pupils are equal, round, and reactive to light. Neck:      Musculoskeletal: Normal range of motion and neck supple. Thyroid: No thyromegaly. Cardiovascular:      Rate and Rhythm: Normal rate and regular rhythm. tablet     Refill:  3    vitamin D (ERGOCALCIFEROL) 1.25 MG (93643 UT) CAPS capsule     Sig: TAKE ONE CAPSULE BY MOUTH ONCE A WEEK     Dispense:  12 capsule     Refill:  3    Blood Glucose Monitoring Suppl (ACURA BLOOD GLUCOSE METER) w/Device KIT     Si Device by Does not apply route 2 times daily     Dispense:  1 kit     Refill:  0    blood glucose monitor strips     Sig: Test 1 times a day & as needed for symptoms of irregular blood glucose. Dispense sufficient amount for indicated testing frequency plus additional to accommodate PRN testing needs. Dispense:  100 strip     Refill:  3     Brand per patient preference. May round up to next available package size.  glipiZIDE (GLUCOTROL XL) 5 MG extended release tablet     Sig: Take 1 tablet by mouth 2 times daily     Dispense:  60 tablet     Refill:  5     Return in about 4 weeks (around 2020), or if symptoms worsen or fail to improve.   Melony Ortiz MD

## 2020-10-29 ENCOUNTER — TELEPHONE (OUTPATIENT)
Dept: FAMILY MEDICINE CLINIC | Age: 65
End: 2020-10-29

## 2020-11-11 ENCOUNTER — OFFICE VISIT (OUTPATIENT)
Dept: FAMILY MEDICINE CLINIC | Age: 65
End: 2020-11-11
Payer: COMMERCIAL

## 2020-11-11 VITALS
OXYGEN SATURATION: 97 % | SYSTOLIC BLOOD PRESSURE: 116 MMHG | HEIGHT: 60 IN | BODY MASS INDEX: 31.45 KG/M2 | DIASTOLIC BLOOD PRESSURE: 64 MMHG | WEIGHT: 160.2 LBS | HEART RATE: 77 BPM | TEMPERATURE: 98.2 F

## 2020-11-11 LAB — HBA1C MFR BLD: 7.9 %

## 2020-11-11 PROCEDURE — 83036 HEMOGLOBIN GLYCOSYLATED A1C: CPT | Performed by: FAMILY MEDICINE

## 2020-11-11 PROCEDURE — 3051F HG A1C>EQUAL 7.0%<8.0%: CPT | Performed by: FAMILY MEDICINE

## 2020-11-11 PROCEDURE — 99213 OFFICE O/P EST LOW 20 MIN: CPT | Performed by: FAMILY MEDICINE

## 2020-11-11 ASSESSMENT — ENCOUNTER SYMPTOMS
RHINORRHEA: 0
EYES NEGATIVE: 1
GASTROINTESTINAL NEGATIVE: 1
COUGH: 0
CHEST TIGHTNESS: 0
RESPIRATORY NEGATIVE: 1

## 2020-11-11 NOTE — PROGRESS NOTES
Diabetes Sister     Heart Disease Sister      Social History     Socioeconomic History    Marital status:      Spouse name: None    Number of children: None    Years of education: None    Highest education level: None   Occupational History    None   Social Needs    Financial resource strain: Not hard at all   [place] insecurity     Worry: Never true     Inability: Never true    Transportation needs     Medical: No     Non-medical: No   Tobacco Use    Smoking status: Never Smoker    Smokeless tobacco: Never Used   Substance and Sexual Activity    Alcohol use: Never     Frequency: Never    Drug use: No    Sexual activity: None   Lifestyle    Physical activity     Days per week: None     Minutes per session: None    Stress: None   Relationships    Social connections     Talks on phone: None     Gets together: None     Attends Buddhist service: None     Active member of club or organization: None     Attends meetings of clubs or organizations: None     Relationship status: None    Intimate partner violence     Fear of current or ex partner: None     Emotionally abused: None     Physically abused: None     Forced sexual activity: None   Other Topics Concern    None   Social History Narrative    None     Current Outpatient Medications   Medication Sig Dispense Refill    rosuvastatin (CRESTOR) 20 MG tablet Take 1 tablet by mouth daily 90 tablet 3    vitamin D (ERGOCALCIFEROL) 1.25 MG (16443 UT) CAPS capsule TAKE ONE CAPSULE BY MOUTH ONCE A WEEK 12 capsule 3    Blood Glucose Monitoring Suppl (ACURA BLOOD GLUCOSE METER) w/Device KIT 1 Device by Does not apply route 2 times daily 1 kit 0    blood glucose monitor strips Test 1 times a day & as needed for symptoms of irregular blood glucose. Dispense sufficient amount for indicated testing frequency plus additional to accommodate PRN testing needs.  100 strip 3    glipiZIDE (GLUCOTROL XL) 5 MG extended release tablet Take 1 tablet by mouth 2 times symptoms of irregular blood glucose. Lancets and test strips to be included 1 kit 0    aspirin 81 MG tablet Take 81 mg by mouth daily.  nitroGLYCERIN (NITROSTAT) 0.4 MG SL tablet Place 0.4 mg under the tongue every 5 minutes as needed.  acetaminophen (TYLENOL ARTHRITIS PAIN) 650 MG CR tablet Take 650 mg by mouth every 8 hours as needed. No current facility-administered medications on file prior to visit. No Known Allergies  Health Maintenance   Topic Date Due    Breast cancer screen  11/05/2006    Shingles Vaccine (1 of 2) 11/05/2006    Colon cancer screen colonoscopy  11/05/2006    Lipid screen  08/31/2019    Flu vaccine (1) 09/01/2020    Pneumococcal 0-64 years Vaccine (1 of 1 - PPSV23) 10/01/2021 (Originally 11/5/1962)    Diabetic retinal exam  10/14/2021 (Originally 11/5/1966)    DTaP/Tdap/Td vaccine (1 - Tdap) 10/14/2021 (Originally 11/5/1975)    Cervical cancer screen  10/14/2021 (Originally 11/5/1977)    Diabetic foot exam  10/14/2021    A1C test (Diabetic or Prediabetic)  10/14/2021    Diabetic microalbuminuria test  10/14/2021    Hepatitis C screen  Completed    HIV screen  Completed    Hepatitis A vaccine  Aged Out    Hib vaccine  Aged Out    Meningococcal (ACWY) vaccine  Aged Out       Review of Systems     Review of Systems   Constitutional: Negative for activity change, appetite change, fatigue and fever. HENT: Negative for congestion and rhinorrhea. Eyes: Negative. Respiratory: Negative. Negative for cough and chest tightness. Cardiovascular: Negative. Gastrointestinal: Negative. Endocrine: Negative. Genitourinary: Negative. Musculoskeletal: Negative. Skin: Negative. Neurological: Negative for dizziness, light-headedness and numbness. Hematological: Negative. Psychiatric/Behavioral: Negative.         Physical Exam  Vitals:    11/11/20 0802   BP: 116/64   Site: Left Upper Arm   Position: Sitting   Cuff Size: Medium Adult Pulse: 77   Temp: 98.2 °F (36.8 °C)   TempSrc: Oral   SpO2: 97%   Weight: 160 lb 3.2 oz (72.7 kg)   Height: 5' (1.524 m)       Physical Exam  Constitutional:       Appearance: She is well-developed. HENT:      Right Ear: External ear normal.      Left Ear: External ear normal.   Eyes:      Pupils: Pupils are equal, round, and reactive to light. Neck:      Musculoskeletal: Normal range of motion and neck supple. Thyroid: No thyromegaly. Cardiovascular:      Rate and Rhythm: Normal rate and regular rhythm. Heart sounds: Normal heart sounds. No murmur. No friction rub. No gallop. Pulmonary:      Effort: Pulmonary effort is normal. No respiratory distress. Breath sounds: No wheezing or rales. Chest:      Chest wall: No tenderness. Abdominal:      General: Bowel sounds are normal. There is no distension. Palpations: Abdomen is soft. There is no mass. Tenderness: There is no abdominal tenderness. There is no guarding or rebound. Musculoskeletal: Normal range of motion. Lymphadenopathy:      Cervical: No cervical adenopathy. Skin:     General: Skin is warm and dry. Neurological:      Mental Status: She is alert and oriented to person, place, and time. Cranial Nerves: No cranial nerve deficit. Coordination: Coordination normal.         Assessment   Diagnosis Orders   1. Type 2 diabetes mellitus with complication, without long-term current use of insulin ([facility])  POCT glycosylated hemoglobin (Hb A1C)     Problem List     None          Plan  Orders Placed This Encounter   Procedures    POCT glycosylated hemoglobin (Hb A1C)     Sugars are improving hga1c down to 7.9 cleared for surgery. No follow-ups on file.   Eddie Blackburn MD

## 2020-11-16 RX ORDER — DILTIAZEM HYDROCHLORIDE 240 MG/1
CAPSULE, EXTENDED RELEASE ORAL
Qty: 90 CAPSULE | Refills: 3 | Status: SHIPPED | OUTPATIENT
Start: 2020-11-16 | End: 2020-11-17

## 2020-11-16 NOTE — TELEPHONE ENCOUNTER
Recent Visits     11/11/2020  Type 2 diabetes mellitus with complication, without long-term current use of insulin [facility])    [facility] AT [facility] and [address], [place]

## 2020-11-17 RX ORDER — DILTIAZEM HYDROCHLORIDE 240 MG/1
CAPSULE, EXTENDED RELEASE ORAL
Qty: 90 CAPSULE | Refills: 0 | Status: SHIPPED | OUTPATIENT
Start: 2020-11-17 | End: 2021-11-19 | Stop reason: SDUPTHER

## 2020-11-17 NOTE — TELEPHONE ENCOUNTER
Recent Visits     11/11/2020  Type 2 diabetes mellitus with complication, without long-term current use of insulin [facility])    [facility] AT [facility] and [address], [place]

## 2021-01-03 ENCOUNTER — HOSPITAL ENCOUNTER (EMERGENCY)
Age: 66
Discharge: HOME | End: 2021-01-03
Attending: EMERGENCY MEDICINE
Payer: COMMERCIAL

## 2021-01-03 VITALS
DIASTOLIC BLOOD PRESSURE: 86 MMHG | SYSTOLIC BLOOD PRESSURE: 147 MMHG | OXYGEN SATURATION: 97 % | RESPIRATION RATE: 20 BRPM | BODY MASS INDEX: 32 KG/M2 | TEMPERATURE: 98.2 F | WEIGHT: 163 LBS | HEART RATE: 81 BPM | HEIGHT: 60 IN

## 2021-01-03 DIAGNOSIS — M54.12 CERVICAL RADICULOPATHY: Primary | ICD-10-CM

## 2021-01-03 LAB
CHP ED QC CHECK: YES
GLUCOSE BLD-MCNC: 272 MG/DL
GLUCOSE BLD-MCNC: 272 MG/DL (ref 60–115)
PERFORMED ON: ABNORMAL

## 2021-01-03 PROCEDURE — 99283 EMERGENCY DEPT VISIT LOW MDM: CPT

## 2021-01-03 PROCEDURE — 96372 THER/PROPH/DIAG INJ SC/IM: CPT

## 2021-01-03 PROCEDURE — 6360000002 HC RX W HCPCS: Performed by: EMERGENCY MEDICINE

## 2021-01-03 RX ORDER — KETOROLAC TROMETHAMINE 30 MG/ML
30 INJECTION, SOLUTION INTRAMUSCULAR; INTRAVENOUS ONCE
Status: COMPLETED | OUTPATIENT
Start: 2021-01-03 | End: 2021-01-03

## 2021-01-03 RX ORDER — HYDROCODONE BITARTRATE AND ACETAMINOPHEN 5; 325 MG/1; MG/1
1 TABLET ORAL EVERY 4 HOURS PRN
Qty: 10 TABLET | Refills: 0 | Status: SHIPPED | OUTPATIENT
Start: 2021-01-03 | End: 2021-01-06

## 2021-01-03 RX ADMIN — KETOROLAC TROMETHAMINE 30 MG: 30 INJECTION, SOLUTION INTRAMUSCULAR at 20:02

## 2021-01-03 ASSESSMENT — PAIN SCALES - GENERAL
PAINLEVEL_OUTOF10: 10
PAINLEVEL_OUTOF10: 10

## 2021-01-03 ASSESSMENT — PAIN DESCRIPTION - PAIN TYPE: TYPE: ACUTE PAIN

## 2021-01-04 NOTE — ED NOTES
Pt stated she lifted something wrong yesterday and pinched something in her left shoulder. Pt reports the pain as sharp 10/10. Pt is A & O x 4. Pt denies CP or SOB.        Amanda Khan RN  01/03/21 2005

## 2021-01-04 NOTE — ED NOTES
Pt verbalized understanding of discharge instructions. Pt ambulated to lobby without difficulty. Ok to d/c.      Romulo Mayes RN  01/03/21 2020

## 2021-01-04 NOTE — ED TRIAGE NOTES
Pt states that she was getting gout of bed yesterday and felt a \"pop\" in her left arm. Pt states that since then she has been having pain shooting down her arm to her hand and into her neck. Pt states that it is a 10/10. nothing makes worse or better. Pt states that she feels from her pain and stress on her body her BGL maybe high and is requesting her BGL be checked.  Pt states that she does not want seen for this issues just needs her BGL checked

## 2021-01-04 NOTE — ED PROVIDER NOTES
[address] [facility] ED  EMERGENCY DEPARTMENT ENCOUNTER      Pt Name: Carole Herron  MRN: [id number]  Lorriegfloni 1955  Date of evaluation: 1/3/2021  Provider: Marjan Bedolla MD    CHIEF COMPLAINT       Chief Complaint   Patient presents with    Arm Pain     left arm, shoulder, and neck.  Hyperglycemia     states after tirage she felt she was high reading 272 at this time. pt states she does have her medciations with her. HISTORY OF PRESENT ILLNESS   (Location/Symptom, Timing/Onset, Context/Setting, Quality, Duration, Modifying Factors, Severity)  Note limiting factors. 71-year-old female presenting with left arm pain. Yesterday morning she was lifting something and felt a pop. She had some numbness that radiated down the left arm afterwards. It is constant. No chest pain or shortness of breath. Has been taking Tylenol, Motrin and using a cream with no relief of symptoms. Made worse by movement of the arm. No numbness or tingling. Nursing Notes were reviewed. REVIEW OF SYSTEMS    (2-9 systems for level 4, 10 or more for level 5)     Review of Systems   Musculoskeletal: Positive for arthralgias. All other systems reviewed and are negative. Except as noted above the remainder of the review of systems was reviewed and negative. PAST MEDICAL HISTORY     Past Medical History:   Diagnosis Date    Atrial fibrillation ([address]     Congenital heart disease     Diabetes mellitus ([facility] [address]     Hyperlipidemia     Hypertension          SURGICAL HISTORY       Past Surgical History:   Procedure Laterality Date     SECTION      CHOLECYSTECTOMY      KIDNEY STONE SURGERY           CURRENT MEDICATIONS       Previous Medications    ACETAMINOPHEN (TYLENOL ARTHRITIS PAIN) 650 MG CR TABLET    Take 650 mg by mouth every 8 hours as needed. ASPIRIN 81 MG TABLET    Take 81 mg by mouth daily.       BLOOD GLUCOSE MONITOR KIT AND SUPPLIES    Test 2 times a day & as needed for symptoms of irregular blood glucose. Lancets and test strips to be included    BLOOD GLUCOSE MONITOR STRIPS    Test 1 times a day & as needed for symptoms of irregular blood glucose. Dispense sufficient amount for indicated testing frequency plus additional to accommodate PRN testing needs. BLOOD GLUCOSE MONITORING SUPPL (ACURA BLOOD GLUCOSE METER) W/DEVICE KIT    1 Device by Does not apply route 2 times daily    DILTIAZEM (TIAZAC) 240 MG EXTENDED RELEASE CAPSULE    TAKE ONE CAPSULE BY MOUTH EVERY DAY    GLIPIZIDE (GLUCOTROL XL) 5 MG EXTENDED RELEASE TABLET    Take 1 tablet by mouth 2 times daily    METOPROLOL TARTRATE (LOPRESSOR) 25 MG TABLET    Take 25 mg by mouth daily    NITROGLYCERIN (NITROSTAT) 0.4 MG SL TABLET    Place 0.4 mg under the tongue every 5 minutes as needed. ONDANSETRON (ZOFRAN) 4 MG TABLET    Take 1 tablet by mouth 3 times daily as needed for Nausea or Vomiting    ROSUVASTATIN (CRESTOR) 20 MG TABLET    Take 1 tablet by mouth daily    VITAMIN D (ERGOCALCIFEROL) 1.25 MG (32771 UT) CAPS CAPSULE    TAKE ONE CAPSULE BY MOUTH ONCE A WEEK       ALLERGIES     Patient has no known allergies.     FAMILY HISTORY       Family History   Problem Relation Age of Onset    Heart Disease Mother     Diabetes Mother     Heart Attack Father     Diabetes Sister     Diabetes Brother     Diabetes Sister     Heart Disease Sister           SOCIAL HISTORY       Social History     Socioeconomic History    Marital status:      Spouse name: None    Number of children: None    Years of education: None    Highest education level: None   Occupational History    None   Social Needs    Financial resource strain: Not hard at all   [employer] insecurity     Worry: Never true     Inability: Never true    Transportation needs     Medical: No     Non-medical: No   Tobacco Use    Smoking status: Never Smoker    Smokeless tobacco: Never Used   Substance and Sexual Activity    Alcohol use: Never     Frequency: Refill: Capillary refill takes less than 2 seconds. Neurological:      General: No focal deficit present. Mental Status: She is alert and oriented to person, place, and time. Mental status is at baseline. Cranial Nerves: No cranial nerve deficit. Psychiatric:         Thought Content: Thought content normal.         DIAGNOSTIC RESULTS     EKG: All EKG's are interpreted by the Emergency Department Physician who either signs or Co-signs this chart in the absence of a cardiologist.    RADIOLOGY:   Non-plain film images such as CT, Ultrasound and MRI are read by the radiologist. Plain radiographic images are visualized and preliminarily interpreted by the emergency physician with the below findings:    Interpretation per the Radiologist below, if available at the time of this note:    No orders to display       LABS:  Labs Reviewed   POCT GLUCOSE - Abnormal; Notable for the following components:       Result Value    POC Glucose 272 (*)     All other components within normal limits   POCT GLUCOSE - Normal       All other labs were within normal range or not returned as of this dictation. EMERGENCY DEPARTMENT COURSE and DIFFERENTIAL DIAGNOSIS/MDM:   Vitals:    Vitals:    01/03/21 1940   BP: (!) 147/86   Pulse: 81   Resp: 20   Temp: 98.2 °F (36.8 °C)   TempSrc: Oral   SpO2: 97%   Weight: 163 lb (73.9 kg)   Height: 5' (1.524 m)       MDM  Number of Diagnoses or Management Options  Cervical radiculopathy  Diagnosis management comments: 70-year-old female presenting with history and physical consistent with cervical radiculopathy. Well-appearing and pain is fairly well controlled. Given Toradol, patient wanting to drive home and will give a prescription for Norco for home. Patient will be discharged home in good condition. Patient has been hemodynamically stable throughout ED course and is appropriate for outpatient follow up.   Patient should follow up with PCP in 2-3 days or return to ED immediately for any new or worsening symptoms. Patient is well appearing on discharge and agreeable with plan of care. Patient Progress  Patient progress: stable      Procedures    CRITICAL CARE TIME   Total Critical Care time was 0 minutes, excluding separately reportable procedures. There was a high probability of clinically significant/life threatening deterioration in the patient's condition which required my urgent intervention. FINAL IMPRESSION      1.  Cervical radiculopathy          DISPOSITION/PLAN   DISPOSITION Decision To Discharge 01/03/2021 07:52:53 PM      (Please note that portions of this note were completed with a voice recognition program.  Efforts were made to edit the dictations but occasionally words are mis-transcribed.)    Maryana Singh MD (electronically signed)  Attending Emergency Physician        Maryana Singh MD  01/03/21 2000

## 2021-01-12 ENCOUNTER — OFFICE VISIT (OUTPATIENT)
Dept: FAMILY MEDICINE CLINIC | Age: 66
End: 2021-01-12
Payer: COMMERCIAL

## 2021-01-12 VITALS
WEIGHT: 163 LBS | OXYGEN SATURATION: 98 % | BODY MASS INDEX: 32 KG/M2 | HEIGHT: 60 IN | SYSTOLIC BLOOD PRESSURE: 124 MMHG | HEART RATE: 82 BPM | DIASTOLIC BLOOD PRESSURE: 80 MMHG | TEMPERATURE: 97.4 F | RESPIRATION RATE: 16 BRPM

## 2021-01-12 DIAGNOSIS — R73.9 HYPERGLYCEMIA: ICD-10-CM

## 2021-01-12 DIAGNOSIS — M25.512 ACUTE PAIN OF LEFT SHOULDER: Primary | ICD-10-CM

## 2021-01-12 DIAGNOSIS — M54.12 CERVICAL RADICULOPATHY: ICD-10-CM

## 2021-01-12 LAB
CHP ED QC CHECK: NORMAL
GLUCOSE BLD-MCNC: 130 MG/DL

## 2021-01-12 PROCEDURE — 99214 OFFICE O/P EST MOD 30 MIN: CPT | Performed by: NURSE PRACTITIONER

## 2021-01-12 PROCEDURE — 82962 GLUCOSE BLOOD TEST: CPT | Performed by: NURSE PRACTITIONER

## 2021-01-12 PROCEDURE — 1111F DSCHRG MED/CURRENT MED MERGE: CPT | Performed by: NURSE PRACTITIONER

## 2021-01-12 RX ORDER — LANCETS 28 GAUGE
EACH MISCELLANEOUS
COMMUNITY
Start: 2020-10-14

## 2021-01-12 RX ORDER — CELECOXIB 100 MG/1
CAPSULE ORAL
COMMUNITY
Start: 2020-12-17 | End: 2021-07-20

## 2021-01-12 ASSESSMENT — PATIENT HEALTH QUESTIONNAIRE - PHQ9
2. FEELING DOWN, DEPRESSED OR HOPELESS: 0
SUM OF ALL RESPONSES TO PHQ QUESTIONS 1-9: 0
SUM OF ALL RESPONSES TO PHQ9 QUESTIONS 1 & 2: 0

## 2021-01-12 ASSESSMENT — ENCOUNTER SYMPTOMS
CHEST TIGHTNESS: 0
COLOR CHANGE: 0
GASTROINTESTINAL NEGATIVE: 1
EYES NEGATIVE: 1
COUGH: 0
WHEEZING: 0
BACK PAIN: 1
SHORTNESS OF BREATH: 0

## 2021-01-12 NOTE — PROGRESS NOTES
Post-Discharge Transitional Care Management Services or Hospital Follow Up      Powell Brittle   YOB: 1955    Date of Office Visit:  1/12/2021  Date of Hospital Admission: 1/3/21  Date of Hospital Discharge: 1/3/21  Readmission Risk Score(high >=14%. Medium >=10%):No data recorded    Care management risk score Rising risk (score 2-5) and Complex Care (Scores >=6): 1     Non face to face  following discharge, date last encounter closed (first attempt may have been earlier): *No documented post hospital discharge outreach found in the last 14 days *No documented post hospital discharge outreach found in the last 14 days    Call initiated 2 business days of discharge: *No response recorded in the last 14 days     Patient Active Problem List   Diagnosis    Hypercholesteremia    HTN (hypertension)    Coronary atherosclerosis    Other chest pain    Cyst of kidney, acquired    Effusion, left ankle    Effusion, right ankle    Frequency of micturition    Long term (current) use of aspirin    Painful micturition, unspecified    Personal history of urinary calculi    Personal history of urinary (tract) infections    Varicose veins of bilateral lower extremities with pain    Gallbladder disease       No Known Allergies    Medications listed as ordered at the time of discharge from [facility] Medication Instructions EZIO:    Printed on:01/12/21 9009   Medication Information                      acetaminophen (TYLENOL ARTHRITIS PAIN) 650 MG CR tablet  Take 650 mg by mouth every 8 hours as needed. aspirin 81 MG tablet  Take 81 mg by mouth daily. blood glucose monitor kit and supplies  Test 2 times a day & as needed for symptoms of irregular blood glucose. Lancets and test strips to be included             blood glucose monitor strips  Test 1 times a day & as needed for symptoms of irregular blood glucose.  Dispense sufficient amount for indicated testing frequency plus additional to accommodate PRN testing needs. Blood Glucose Monitoring Suppl (ACURA BLOOD GLUCOSE METER) w/Device KIT  1 Device by Does not apply route 2 times daily             celecoxib (CELEBREX) 100 MG capsule  TAKE ONE CAPSULE BY MOUTH TWO TIMES A DAY             dilTIAZem (TIAZAC) 240 MG extended release capsule  TAKE ONE CAPSULE BY MOUTH EVERY DAY             FreeStyle Lancets MISC  USE TO TEST ONCE TO TWICE DAILY AND AS NEEDED FOR SYMPTOMS OF IRREGULAR BLOOD GLUCOSE             glipiZIDE (GLUCOTROL XL) 5 MG extended release tablet  Take 1 tablet by mouth 2 times daily             metoprolol tartrate (LOPRESSOR) 25 MG tablet  Take 25 mg by mouth daily             nitroGLYCERIN (NITROSTAT) 0.4 MG SL tablet  Place 0.4 mg under the tongue every 5 minutes as needed.                ondansetron (ZOFRAN) 4 MG tablet  Take 1 tablet by mouth 3 times daily as needed for Nausea or Vomiting             rosuvastatin (CRESTOR) 20 MG tablet  Take 1 tablet by mouth daily             vitamin D (ERGOCALCIFEROL) 1.25 MG (25655 UT) CAPS capsule  TAKE ONE CAPSULE BY MOUTH ONCE A WEEK                   Medications marked \"taking\" at this time  Outpatient Medications Marked as Taking for the 1/12/21 encounter (Office Visit) with EJ Park CNP   Medication Sig Dispense Refill    FreeStyle Lancets MISC USE TO TEST ONCE TO TWICE DAILY AND AS NEEDED FOR SYMPTOMS OF IRREGULAR BLOOD GLUCOSE      celecoxib (CELEBREX) 100 MG capsule TAKE ONE CAPSULE BY MOUTH TWO TIMES A DAY      dilTIAZem (TIAZAC) 240 MG extended release capsule TAKE ONE CAPSULE BY MOUTH EVERY DAY 90 capsule 0    rosuvastatin (CRESTOR) 20 MG tablet Take 1 tablet by mouth daily 90 tablet 3    vitamin D (ERGOCALCIFEROL) 1.25 MG (98808 UT) CAPS capsule TAKE ONE CAPSULE BY MOUTH ONCE A WEEK 12 capsule 3    Blood Glucose Monitoring Suppl (ACURA BLOOD GLUCOSE METER) w/Device KIT 1 Device by Does not apply route 2 times daily 1 kit 0 Negative for dizziness, syncope, weakness, light-headedness and headaches. Psychiatric/Behavioral: Negative. Vitals:    01/12/21 1428   BP: 124/80   Site: Left Upper Arm   Position: Sitting   Cuff Size: Medium Adult   Pulse: 82   Resp: 16   Temp: 97.4 °F (36.3 °C)   TempSrc: Temporal   SpO2: 98%   Weight: 163 lb (73.9 kg)   Height: 5' (1.524 m)     Body mass index is 31.83 kg/m². Wt Readings from Last 3 Encounters:   01/12/21 163 lb (73.9 kg)   01/03/21 163 lb (73.9 kg)   11/11/20 160 lb 3.2 oz (72.7 kg)     BP Readings from Last 3 Encounters:   01/12/21 124/80   01/03/21 (!) 147/86   11/11/20 116/64       Physical Exam  Constitutional:       General: She is not in acute distress. Appearance: Normal appearance. She is not toxic-appearing. HENT:      Head: Normocephalic and atraumatic. Right Ear: External ear normal.      Left Ear: External ear normal.      Nose: Nose normal.      Mouth/Throat:      Mouth: Mucous membranes are moist.   Eyes:      Extraocular Movements: Extraocular movements intact. Conjunctiva/sclera: Conjunctivae normal.      Pupils: Pupils are equal, round, and reactive to light. Cardiovascular:      Rate and Rhythm: Normal rate and regular rhythm. Heart sounds: Normal heart sounds. Pulmonary:      Effort: Pulmonary effort is normal. No respiratory distress. Breath sounds: Normal breath sounds. No wheezing. Musculoskeletal:      Left shoulder: She exhibits decreased range of motion and tenderness. She exhibits no swelling. Cervical back: She exhibits tenderness. She exhibits normal range of motion and no edema. Skin:     General: Skin is warm and dry. Findings: No erythema. Neurological:      General: No focal deficit present. Mental Status: She is alert and oriented to person, place, and time. Cranial Nerves: No cranial nerve deficit. Sensory: No sensory deficit. Motor: No weakness.       Coordination: Coordination normal.

## 2021-01-15 ENCOUNTER — TELEPHONE (OUTPATIENT)
Dept: FAMILY MEDICINE CLINIC | Age: 66
End: 2021-01-15

## 2021-01-15 NOTE — TELEPHONE ENCOUNTER
[place] Larsson called checking status of faxed forms from [facility] on 1/12/2021 post ov w/ Jad Loza CNP    She states forms need to be signed by herself as well as provider - pt requesting to please call her to let her know forms are ready for her sign    pcp is Dr Anette Manjarrez was with John sampson

## 2021-04-16 DIAGNOSIS — E11.8 TYPE 2 DIABETES MELLITUS WITH COMPLICATION, WITHOUT LONG-TERM CURRENT USE OF INSULIN (HCC): ICD-10-CM

## 2021-04-19 DIAGNOSIS — E11.8 TYPE 2 DIABETES MELLITUS WITH COMPLICATION, WITHOUT LONG-TERM CURRENT USE OF INSULIN (HCC): ICD-10-CM

## 2021-04-19 RX ORDER — GLIPIZIDE 5 MG/1
TABLET, FILM COATED, EXTENDED RELEASE ORAL
Qty: 60 TABLET | Refills: 2 | Status: SHIPPED | OUTPATIENT
Start: 2021-04-19 | End: 2021-10-18

## 2021-04-19 RX ORDER — GLIPIZIDE 5 MG/1
TABLET, FILM COATED, EXTENDED RELEASE ORAL
Qty: 60 TABLET | Refills: 1 | Status: SHIPPED | OUTPATIENT
Start: 2021-04-19 | End: 2021-04-19

## 2021-04-19 NOTE — TELEPHONE ENCOUNTER
Patient needs her diabetic medication refilled, as she only has 5 pills left, and her office visit is not until next Tuesday, 4/26/21. Thank you.

## 2021-04-19 NOTE — TELEPHONE ENCOUNTER
Future Appointments     Provider Department   4/27/2021 MD YAN Burden AT [facility]   Appt Notes: Diabetes, covid: no symptoms, psc/R[facility], [facility]   Recent Visits    01/12/2021 Acute pain of left shoulder   [facility] and [address] [place] [address], APRN - CNP

## 2021-04-27 ENCOUNTER — OFFICE VISIT (OUTPATIENT)
Dept: FAMILY MEDICINE CLINIC | Age: 66
End: 2021-04-27
Payer: COMMERCIAL

## 2021-04-27 DIAGNOSIS — R51.9 NONINTRACTABLE HEADACHE, UNSPECIFIED CHRONICITY PATTERN, UNSPECIFIED HEADACHE TYPE: ICD-10-CM

## 2021-04-27 DIAGNOSIS — R53.83 FATIGUE, UNSPECIFIED TYPE: Primary | ICD-10-CM

## 2021-04-27 DIAGNOSIS — R53.83 FATIGUE, UNSPECIFIED TYPE: ICD-10-CM

## 2021-04-27 PROCEDURE — 99213 OFFICE O/P EST LOW 20 MIN: CPT | Performed by: PHYSICIAN ASSISTANT

## 2021-04-27 ASSESSMENT — ENCOUNTER SYMPTOMS
BACK PAIN: 0
COUGH: 0
SHORTNESS OF BREATH: 0
DIARRHEA: 0
TROUBLE SWALLOWING: 0
ABDOMINAL PAIN: 0
VOMITING: 0
SINUS PRESSURE: 0
CHEST TIGHTNESS: 0

## 2021-04-27 ASSESSMENT — PATIENT HEALTH QUESTIONNAIRE - PHQ9
SUM OF ALL RESPONSES TO PHQ QUESTIONS 1-9: 0
2. FEELING DOWN, DEPRESSED OR HOPELESS: 0
SUM OF ALL RESPONSES TO PHQ QUESTIONS 1-9: 0
SUM OF ALL RESPONSES TO PHQ9 QUESTIONS 1 & 2: 0

## 2021-04-27 NOTE — PROGRESS NOTES
Subjective:      Patient ID: Taina Villareal is a 60 y.o. female who presents today for:  Chief Complaint   Patient presents with    Other     Pt is here c/o body aches and dizziness. Pt states she received the 2nd Covid vaccination on 2021, and the bodyaches and dizzy spells followed. HPI  60year old female who experience body aches, dizziness and headache this weekend. She had the COVID-19 vaccine on 21. She is concerned she may have caught.  COVID-19    Past Medical History:   Diagnosis Date    Atrial fibrillation ([facility] [address]     Congenital heart disease     Diabetes mellitus ([facility] [address]     Hyperlipidemia     Hypertension      Past Surgical History:   Procedure Laterality Date     SECTION      CHOLECYSTECTOMY      KIDNEY STONE SURGERY       Social History     Socioeconomic History    Marital status:      Spouse name: Not on file    Number of children: Not on file    Years of education: Not on file    Highest education level: Not on file   Occupational History    Not on file   Social Needs    Financial resource strain: Not hard at all   [employer] insecurity     Worry: Never true     Inability: Never true   [employer] needs     Medical: No     Non-medical: No   Tobacco Use    Smoking status: Never Smoker    Smokeless tobacco: Never Used   Substance and Sexual Activity    Alcohol use: Never     Frequency: Never    Drug use: No    Sexual activity: Not on file   Lifestyle    Physical activity     Days per week: Not on file     Minutes per session: Not on file    Stress: Not on file   Relationships    Social connections     Talks on phone: Not on file     Gets together: Not on file     Attends Jainism service: Not on file     Active member of club or organization: Not on file     Attends meetings of clubs or organizations: Not on file     Relationship status: Not on file    Intimate partner violence     Fear of current or ex partner: Not on file     Emotionally abused: Not on file     Physically abused: Not on file     Forced sexual activity: Not on file   Other Topics Concern    Not on file   Social History Narrative    Not on file     Family History   Problem Relation Age of Onset    Heart Disease Mother     Diabetes Mother     Heart Attack Father     Diabetes Sister     Diabetes Brother     Diabetes Sister     Heart Disease Sister      No Known Allergies  Current Outpatient Medications   Medication Sig Dispense Refill    glipiZIDE (GLUCOTROL XL) 5 MG extended release tablet TAKE ONE TABLET BY MOUTH TWO TIMES A DAY 60 tablet 2    FreeStyle Lancets MISC USE TO TEST ONCE TO TWICE DAILY AND AS NEEDED FOR SYMPTOMS OF IRREGULAR BLOOD GLUCOSE      celecoxib (CELEBREX) 100 MG capsule TAKE ONE CAPSULE BY MOUTH TWO TIMES A DAY      dilTIAZem (TIAZAC) 240 MG extended release capsule TAKE ONE CAPSULE BY MOUTH EVERY DAY 90 capsule 0    rosuvastatin (CRESTOR) 20 MG tablet Take 1 tablet by mouth daily 90 tablet 3    vitamin D (ERGOCALCIFEROL) 1.25 MG (32050 UT) CAPS capsule TAKE ONE CAPSULE BY MOUTH ONCE A WEEK 12 capsule 3    Blood Glucose Monitoring Suppl ([employer] BLOOD GLUCOSE METER) w/Device KIT 1 Device by Does not apply route 2 times daily 1 kit 0    blood glucose monitor strips Test 1 times a day & as needed for symptoms of irregular blood glucose. Dispense sufficient amount for indicated testing frequency plus additional to accommodate PRN testing needs. 100 strip 3    ondansetron (ZOFRAN) 4 MG tablet Take 1 tablet by mouth 3 times daily as needed for Nausea or Vomiting 15 tablet 0    metoprolol tartrate (LOPRESSOR) 25 MG tablet Take 25 mg by mouth daily      blood glucose monitor kit and supplies Test 2 times a day & as needed for symptoms of irregular blood glucose. Lancets and test strips to be included 1 kit 0    aspirin 81 MG tablet Take 81 mg by mouth daily.         nitroGLYCERIN (NITROSTAT) 0.4 MG SL tablet Place 0.4 mg under the tongue every 5 minutes as needed.  acetaminophen (TYLENOL ARTHRITIS PAIN) 650 MG CR tablet Take 650 mg by mouth every 8 hours as needed. No current facility-administered medications for this visit. Review of Systems   Constitutional: Negative for activity change, appetite change, chills, fever and unexpected weight change. HENT: Negative for drooling, ear pain, nosebleeds, sinus pressure and trouble swallowing. Respiratory: Negative for cough, chest tightness and shortness of breath. Cardiovascular: Negative for chest pain and leg swelling. Gastrointestinal: Negative for abdominal pain, diarrhea and vomiting. Endocrine: Negative for polydipsia and polyphagia. Genitourinary: Negative for dysuria, flank pain and frequency. Musculoskeletal: Negative for back pain and myalgias. Skin: Negative for pallor and rash. Neurological: Negative for syncope, weakness and headaches. Hematological: Does not bruise/bleed easily. All other systems reviewed and are negative. Objective: There were no vitals taken for this visit. Physical Exam  Vitals signs and nursing note reviewed. Constitutional:       General: She is awake. She is not in acute distress. Appearance: Normal appearance. She is well-developed and normal weight. She is not ill-appearing, toxic-appearing or diaphoretic. Comments: No photophobia. No phonophobia. HENT:      Head: Normocephalic and atraumatic. No Porter's sign. Right Ear: External ear normal.      Left Ear: External ear normal.      Nose: Nose normal. No congestion or rhinorrhea. Mouth/Throat:      Mouth: Mucous membranes are moist.      Pharynx: Oropharynx is clear. No oropharyngeal exudate or posterior oropharyngeal erythema. Eyes:      General: No scleral icterus. Right eye: No foreign body or discharge. Left eye: No discharge. Extraocular Movements: Extraocular movements intact.       Conjunctiva/sclera: Conjunctivae normal.      Left eye: No exudate. Pupils: Pupils are equal, round, and reactive to light. Neck:      Musculoskeletal: Normal range of motion and neck supple. No neck rigidity. Vascular: No JVD. Trachea: No tracheal deviation. Comments: No meningismus. Cardiovascular:      Rate and Rhythm: Normal rate and regular rhythm. Heart sounds: Normal heart sounds. Heart sounds not distant. No murmur. No friction rub. No gallop. Pulmonary:      Effort: Pulmonary effort is normal. No respiratory distress. Breath sounds: Normal breath sounds. No stridor. No wheezing, rhonchi or rales. Chest:      Chest wall: No tenderness. Abdominal:      General: Abdomen is flat. Bowel sounds are normal. There is no distension. Palpations: Abdomen is soft. There is no mass. Tenderness: There is no abdominal tenderness. There is no right CVA tenderness, left CVA tenderness, guarding or rebound. Hernia: No hernia is present. Musculoskeletal: Normal range of motion. General: No swelling, tenderness, deformity or signs of injury. Lymphadenopathy:      Head:      Right side of head: No submental adenopathy. Left side of head: No submental adenopathy. Skin:     General: Skin is warm and dry. Capillary Refill: Capillary refill takes less than 2 seconds. Coloration: Skin is not jaundiced or pale. Findings: No bruising, erythema, lesion or rash. Neurological:      General: No focal deficit present. Mental Status: She is alert and oriented to person, place, and time. Mental status is at baseline. Cranial Nerves: No cranial nerve deficit. Sensory: No sensory deficit. Motor: No weakness. Coordination: Coordination normal.      Deep Tendon Reflexes: Reflexes are normal and symmetric. Psychiatric:         Mood and Affect: Mood normal.         Behavior: Behavior normal. Behavior is cooperative. Thought Content:  Thought content normal. Judgment: Judgment normal.         Assessment:       Diagnosis Orders   1. Fatigue, unspecified type  COVID-19 Ambulatory   2. Nonintractable headache, unspecified chronicity pattern, unspecified headache type  COVID-19 Ambulatory     No results found for this visit on 04/27/21. Plan:     Assessment & Plan   Debbi Berry was seen today for other. Diagnoses and all orders for this visit:    Fatigue, unspecified type  -     COVID-19 Ambulatory; Future    Nonintractable headache, unspecified chronicity pattern, unspecified headache type  -     COVID-19 Ambulatory; Future      Orders Placed This Encounter   Procedures    COVID-19 Ambulatory     Standing Status:   Future     Standing Expiration Date:   4/27/2022     Scheduling Instructions:      Saline media preferred given current shortage of viral transport media but both acceptable     Order Specific Question:   Is this test for diagnosis or screening? Answer:   Diagnosis of ill patient     Order Specific Question:   Symptomatic for COVID-19 as defined by CDC? Answer:   Yes     Order Specific Question:   Date of Symptom Onset     Answer:   4/23/2021     Order Specific Question:   Hospitalized for COVID-19? Answer:   No     Order Specific Question:   Admitted to ICU for COVID-19? Answer:   No     Order Specific Question:   Employed in healthcare setting? Answer:   No     Order Specific Question:   Resident in a congregate (group) care setting? Answer:   No     Order Specific Question:   Pregnant? Answer:   No     Order Specific Question:   Previously tested for COVID-19? Answer:   No     No orders of the defined types were placed in this encounter. There are no discontinued medications. Return if symptoms worsen or fail to improve. Reviewed with the patient/family: current clinical status & medications.   Side effects of the medication prescribed today, as well as treatment plan/rationale and result expectations have been discussed with the patient/family who expresses understanding. Patient will be discharged home in stable condition. Follow up with PCP to evaluate treatment results or return if symptoms worsen or fail to improve. Discussed signs and symptoms which require immediate follow-up in ED/call to 911. Understanding verbalized. I have reviewed the patient's medical history in detail and updated the computerized patient record.     ANYI Sharma

## 2021-04-28 NOTE — PATIENT INSTRUCTIONS
from front to back. When should you call for help? Call your doctor now or seek immediate medical care if:    · Symptoms such as fever, chills, nausea, or vomiting get worse or appear for the first time.     · You have new pain in your back just below your rib cage. This is called flank pain.     · There is new blood or pus in your urine.     · You have any problems with your antibiotic medicine.    Watch closely for changes in your health, and be sure to contact your doctor if:    · You are not getting better after taking an antibiotic for 2 days.     · Your symptoms go away but then come back. Where can you learn more? Go to https://XiaohongshupeNanotron Technologieseb.Controlus. org and sign in to your Kelkoo account. Enter O978 in the NaphCare box to learn more about \"Urinary Tract Infection in Women: Care Instructions. \"     If you do not have an account, please click on the \"Sign Up Now\" link. Current as of: May 12, 2017  Content Version: 11.6  © 8578-9820 [employer]. Care instructions adapted under license by [facility] ([facility]). If you have questions about a medical condition or this instruction, always ask your healthcare professional. [place] [address] any warranty or liability for your use of this information. Yes

## 2021-04-29 LAB
SARS-COV-2: NOT DETECTED
SOURCE: NORMAL

## 2021-06-12 ENCOUNTER — OFFICE VISIT (OUTPATIENT)
Dept: FAMILY MEDICINE CLINIC | Age: 66
End: 2021-06-12
Payer: COMMERCIAL

## 2021-06-12 VITALS
SYSTOLIC BLOOD PRESSURE: 132 MMHG | HEART RATE: 87 BPM | DIASTOLIC BLOOD PRESSURE: 92 MMHG | TEMPERATURE: 97.7 F | OXYGEN SATURATION: 97 %

## 2021-06-12 DIAGNOSIS — J01.90 ACUTE BACTERIAL SINUSITIS: ICD-10-CM

## 2021-06-12 DIAGNOSIS — R06.02 SOB (SHORTNESS OF BREATH): Primary | ICD-10-CM

## 2021-06-12 DIAGNOSIS — B96.89 ACUTE BACTERIAL SINUSITIS: ICD-10-CM

## 2021-06-12 DIAGNOSIS — J40 BRONCHITIS: ICD-10-CM

## 2021-06-12 PROCEDURE — 99213 OFFICE O/P EST LOW 20 MIN: CPT | Performed by: NURSE PRACTITIONER

## 2021-06-12 RX ORDER — ALBUTEROL SULFATE 90 UG/1
2 AEROSOL, METERED RESPIRATORY (INHALATION) EVERY 6 HOURS PRN
Qty: 1 INHALER | Refills: 3 | Status: SHIPPED | OUTPATIENT
Start: 2021-06-12

## 2021-06-12 RX ORDER — BROMPHENIRAMINE MALEATE, PSEUDOEPHEDRINE HYDROCHLORIDE, AND DEXTROMETHORPHAN HYDROBROMIDE 2; 30; 10 MG/5ML; MG/5ML; MG/5ML
5 SYRUP ORAL NIGHTLY PRN
Qty: 1 BOTTLE | Refills: 0 | Status: SHIPPED | OUTPATIENT
Start: 2021-06-12 | End: 2021-06-17

## 2021-06-12 RX ORDER — AMOXICILLIN AND CLAVULANATE POTASSIUM 875; 125 MG/1; MG/1
1 TABLET, FILM COATED ORAL 2 TIMES DAILY
Qty: 20 TABLET | Refills: 0 | Status: SHIPPED | OUTPATIENT
Start: 2021-06-12 | End: 2021-06-22

## 2021-06-12 RX ORDER — FLUTICASONE PROPIONATE 50 MCG
1 SPRAY, SUSPENSION (ML) NASAL DAILY
Qty: 2 BOTTLE | Refills: 1 | Status: SHIPPED | OUTPATIENT
Start: 2021-06-12 | End: 2021-12-14 | Stop reason: ALTCHOICE

## 2021-06-12 NOTE — PATIENT INSTRUCTIONS
Patient Education        Bronchitis: Care Instructions  Your Care Instructions     Bronchitis is inflammation of the bronchial tubes, which carry air to the lungs. The tubes swell and produce mucus, or phlegm. The mucus and inflamed bronchial tubes make you cough. You may have trouble breathing. Most cases of bronchitis are caused by viruses like those that cause colds. Antibiotics usually do not help and they may be harmful. Bronchitis usually develops rapidly and lasts about 2 to 3 weeks in otherwise healthy people. Follow-up care is a key part of your treatment and safety. Be sure to make and go to all appointments, and call your doctor if you are having problems. It's also a good idea to know your test results and keep a list of the medicines you take. How can you care for yourself at home? · Take all medicines exactly as prescribed. Call your doctor if you think you are having a problem with your medicine. · Get some extra rest.  · Take an over-the-counter pain medicine, such as acetaminophen (Tylenol), ibuprofen (Advil, Motrin), or naproxen (Aleve) to reduce fever and relieve body aches. Read and follow all instructions on the label. · Do not take two or more pain medicines at the same time unless the doctor told you to. Many pain medicines have acetaminophen, which is Tylenol. Too much acetaminophen (Tylenol) can be harmful. · Take an over-the-counter cough medicine that contains dextromethorphan to help quiet a dry, hacking cough so that you can sleep. Avoid cough medicines that have more than one active ingredient. Read and follow all instructions on the label. · Breathe moist air from a humidifier, hot shower, or sink filled with hot water. The heat and moisture will thin mucus so you can cough it out. · Do not smoke. Smoking can make bronchitis worse. If you need help quitting, talk to your doctor about stop-smoking programs and medicines.  These can increase your chances of quitting for good.  When should you call for help? Call 911 anytime you think you may need emergency care. For example, call if:    · You have severe trouble breathing. Call your doctor now or seek immediate medical care if:    · You have new or worse trouble breathing.     · You cough up dark brown or bloody mucus (sputum).     · You have a new or higher fever.     · You have a new rash. Watch closely for changes in your health, and be sure to contact your doctor if:    · You cough more deeply or more often, especially if you notice more mucus or a change in the color of your mucus.     · You are not getting better as expected. Where can you learn more? Go to https://Twiigg.Billaway. org and sign in to your M-KOPA account. Enter H333 in the Buzz Referrals box to learn more about \"Bronchitis: Care Instructions. \"     If you do not have an account, please click on the \"Sign Up Now\" link. Current as of: October 26, 2020               Content Version: 12.8  © 2006-2021 [employer]. Care instructions adapted under license by [facility] ([facility]). If you have questions about a medical condition or this instruction, always ask your healthcare professional. [place] [address] any warranty or liability for your use of this information. Patient Education        Sinusitis: Care Instructions  Your Care Instructions     Sinusitis is an infection of the lining of the sinus cavities in your head. Sinusitis often follows a cold. It causes pain and pressure in your head and face. In most cases, sinusitis gets better on its own in 1 to 2 weeks. But some mild symptoms may last for several weeks. Sometimes antibiotics are needed. Follow-up care is a key part of your treatment and safety. Be sure to make and go to all appointments, and call your doctor if you are having problems. It's also a good idea to know your test results and keep a list of the medicines you take.   How can you care for yourself at home? · Take an over-the-counter pain medicine, such as acetaminophen (Tylenol), ibuprofen (Advil, Motrin), or naproxen (Aleve). Read and follow all instructions on the label. · If the doctor prescribed antibiotics, take them as directed. Do not stop taking them just because you feel better. You need to take the full course of antibiotics. · Be careful when taking over-the-counter cold or flu medicines and Tylenol at the same time. Many of these medicines have acetaminophen, which is Tylenol. Read the labels to make sure that you are not taking more than the recommended dose. Too much acetaminophen (Tylenol) can be harmful. · Breathe warm, moist air from a steamy shower, a hot bath, or a sink filled with hot water. Avoid cold, dry air. Using a humidifier in your home may help. Follow the directions for cleaning the machine. · Use saline (saltwater) nasal washes. This can help keep your nasal passages open and wash out mucus and bacteria. You can buy saline nose drops at a grocery store or drugstore. Or you can make your own at home by adding 1 teaspoon of salt and 1 teaspoon of baking soda to 2 cups of distilled water. If you make your own, fill a bulb syringe with the solution, insert the tip into your nostril, and squeeze gently. Gearline Pauling your nose. · Put a hot, wet towel or a warm gel pack on your face 3 or 4 times a day for 5 to 10 minutes each time. · Try a decongestant nasal spray like oxymetazoline (Afrin). Do not use it for more than 3 days in a row. Using it for more than 3 days can make your congestion worse. When should you call for help? Call your doctor now or seek immediate medical care if:    · You have new or worse swelling or redness in your face or around your eyes.     · You have a new or higher fever.    Watch closely for changes in your health, and be sure to contact your doctor if:    · You have new or worse facial pain.     · The mucus from your nose becomes thicker (like pus) or has new blood in it.     · You are not getting better as expected. Where can you learn more? Go to https://Trimel Pharmaceuticalspepiceweb.Stix Games. org and sign in to your Bandtastic account. Enter C471 in the Hire Space box to learn more about \"Sinusitis: Care Instructions. \"     If you do not have an account, please click on the \"Sign Up Now\" link. Current as of: December 2, 2020               Content Version: 12.8  © 2006-2021 Healthwise, Incorporated. Care instructions adapted under license by [facility] ([facility]). If you have questions about a medical condition or this instruction, always ask your healthcare professional. [address] any warranty or liability for your use of this information.

## 2021-06-12 NOTE — LETTER
[facility]  [address] [address] [address]  Phone: [phone]  Fax: [phone] [facility], APRN - CNP        June 12, 2021     Patient: Marissa Armando   YOB: 1955   Date of Visit: 6/12/2021       To Whom It May Concern: It is my medical opinion that Marissa Armando may return to work on 6/14/21 with no restrictions . If you have any questions or concerns, please don't hesitate to call.     Sincerely,      EJ Solitario - CNP

## 2021-06-12 NOTE — PROGRESS NOTES
[facility] Encounter  CHIEF COMPLAINT       Chief Complaint   Patient presents with    Diarrhea     1 day    URI     cough, wheezing, fever, x2 days, tx: OTC      HISTORY OF PRESENT ILLNESS   Felecia Mata is a 60 y.o. female who presents with:  HPI   Patient with complaints of: coughing, wheezing-with shortness of breath, and fever for the past 2 days. Now with complaints of diarrhea x 2 today. No melena or hematochezia. No abd pain, N/V. No recent antibiotics. No H/O asthma or COPD. Non-smoker. Congestion with PND. +sore throat. +Sinus  pressure. No otalgia. She denies CP, palpitations, or syncopal episodes. Reports TMAX last night of 101.0 took tylenol with good response. Has tried OTC medications for URI symptoms with no response. Vaccinated for COVID-19 -second dose on 21. REVIEW OF SYSTEMS     Review of Systems   All other systems reviewed and are negative. PAST MEDICAL HISTORY         Diagnosis Date    Atrial fibrillation ([facility] [address]     Congenital heart disease     Diabetes mellitus ([facility] [address] [facility]     Hyperlipidemia     Hypertension      SURGICAL HISTORY     Patient  has a past surgical history that includes  section (); Kidney stone surgery (); and Cholecystectomy. CURRENT MEDICATIONS       Previous Medications    ACETAMINOPHEN (TYLENOL ARTHRITIS PAIN) 650 MG CR TABLET    Take 650 mg by mouth every 8 hours as needed. ASPIRIN 81 MG TABLET    Take 81 mg by mouth daily. BLOOD GLUCOSE MONITOR KIT AND SUPPLIES    Test 2 times a day & as needed for symptoms of irregular blood glucose. Lancets and test strips to be included    BLOOD GLUCOSE MONITOR STRIPS    Test 1 times a day & as needed for symptoms of irregular blood glucose. Dispense sufficient amount for indicated testing frequency plus additional to accommodate PRN testing needs.     BLOOD GLUCOSE MONITORING SUPPL (ACURA BLOOD GLUCOSE METER) W/DEVICE KIT    1 Device by Does not apply route 2 times daily    CELECOXIB (CELEBREX) 100 MG CAPSULE    TAKE ONE CAPSULE BY MOUTH TWO TIMES A DAY    DILTIAZEM (TIAZAC) 240 MG EXTENDED RELEASE CAPSULE    TAKE ONE CAPSULE BY MOUTH EVERY DAY    FREESTYLE LANCETS MISC    USE TO TEST ONCE TO TWICE DAILY AND AS NEEDED FOR SYMPTOMS OF IRREGULAR BLOOD GLUCOSE    GLIPIZIDE (GLUCOTROL XL) 5 MG EXTENDED RELEASE TABLET    TAKE ONE TABLET BY MOUTH TWO TIMES A DAY    METOPROLOL TARTRATE (LOPRESSOR) 25 MG TABLET    Take 25 mg by mouth daily    NITROGLYCERIN (NITROSTAT) 0.4 MG SL TABLET    Place 0.4 mg under the tongue every 5 minutes as needed. ONDANSETRON (ZOFRAN) 4 MG TABLET    Take 1 tablet by mouth 3 times daily as needed for Nausea or Vomiting    ROSUVASTATIN (CRESTOR) 20 MG TABLET    Take 1 tablet by mouth daily    VITAMIN D (ERGOCALCIFEROL) 1.25 MG (89525 UT) CAPS CAPSULE    TAKE ONE CAPSULE BY MOUTH ONCE A WEEK     ALLERGIES     Patient is has No Known Allergies. FAMILY HISTORY     Patient'sfamily history includes Diabetes in her brother, mother, sister, and sister; Heart Attack in her father; Heart Disease in her mother and sister. HISTORY     Patient  reports that she has never smoked. She has never used smokeless tobacco. She reports that she does not drink alcohol and does not use drugs. PHYSICAL EXAM     VITALS  BP: (!) 132/92, Temp: 97.7 °F (36.5 °C), Pulse: 87,  , SpO2: 97 %  Physical Exam  Vitals reviewed. Constitutional:       General: She is not in acute distress. Appearance: She is well-developed and normal weight. She is ill-appearing. She is not toxic-appearing or diaphoretic. HENT:      Head: Normocephalic and atraumatic. Right Ear: Tympanic membrane, ear canal and external ear normal. There is no impacted cerumen. Left Ear: Tympanic membrane, ear canal and external ear normal. There is no impacted cerumen. Nose: Congestion present. Right Sinus: Frontal sinus tenderness present.       Left Sinus: Frontal sinus STANDARD (2 VW)    (Results Pending)     Scheduled Meds:  Continuous Infusions:  PRN Meds:. PROCEDURES:  FINAL IMPRESSION      1. SOB (shortness of breath)    2. Bronchitis    3. Acute bacterial sinusitis      DISPOSITION/PLAN   1. CXR pending. Patient encouraged to come into the nearest ED if symptoms worsen or are not improved with albuterol inhaler (oral care after each use). 2. Bronchitis: increased fluids/rest. PNE in differential. recommend only using BROMFED at night 5 days MAX. S/E d/w the patient. 3. Start Augmentin and Flonase. Consider [place] pot. Supportive care: Tylenol/Motrin per label instructions PRN. Return if symptoms worsen or fail to improve by tomorrow, for follow-up with PCP. Side effects and adverse effects of any medication prescribed today, as well as treatment plan/rationale, follow-up care, and result expectations have been discussed with the patient. Expresses understanding and desires to proceed with treatment plan. Discussed signs and symptoms which require immediate follow-up in ED/call to 911. Understanding verbalized. I have reviewed and updated the electronic medical record. PATIENT REFERRED TO:  Return if symptoms worsen or fail to improve. DISCHARGE MEDICATIONS:  New Prescriptions    ALBUTEROL SULFATE HFA (PROVENTIL HFA) 108 (90 BASE) MCG/ACT INHALER    Inhale 2 puffs into the lungs every 6 hours as needed for Wheezing or Shortness of Breath    AMOXICILLIN-CLAVULANATE (AUGMENTIN) 875-125 MG PER TABLET    Take 1 tablet by mouth 2 times daily for 10 days    BROMPHENIRAMINE-PSEUDOEPHEDRINE-DM 2-30-10 MG/5ML SYRUP    Take 5 mLs by mouth nightly as needed for Cough    FLUTICASONE (FLONASE) 50 MCG/ACT NASAL SPRAY    1 spray by Each Nostril route daily     Cannot display discharge medications since this is not an admission.        Soto Kyle, APRN - CNP

## 2021-06-17 ENCOUNTER — HOSPITAL ENCOUNTER (OUTPATIENT)
Dept: GENERAL RADIOLOGY | Age: 66
Discharge: HOME | End: 2021-06-19
Payer: COMMERCIAL

## 2021-06-17 DIAGNOSIS — R06.02 SOB (SHORTNESS OF BREATH): ICD-10-CM

## 2021-06-17 PROCEDURE — 71046 X-RAY EXAM CHEST 2 VIEWS: CPT

## 2021-07-12 ENCOUNTER — HOSPITAL ENCOUNTER (OUTPATIENT)
Dept: ULTRASOUND IMAGING | Age: 66
Discharge: HOME | End: 2021-07-14
Payer: COMMERCIAL

## 2021-07-12 ENCOUNTER — OFFICE VISIT (OUTPATIENT)
Dept: FAMILY MEDICINE CLINIC | Age: 66
End: 2021-07-12

## 2021-07-12 VITALS
WEIGHT: 163 LBS | OXYGEN SATURATION: 97 % | SYSTOLIC BLOOD PRESSURE: 144 MMHG | DIASTOLIC BLOOD PRESSURE: 86 MMHG | BODY MASS INDEX: 32 KG/M2 | TEMPERATURE: 96.1 F | HEIGHT: 60 IN | HEART RATE: 65 BPM

## 2021-07-12 DIAGNOSIS — R73.9 HYPERGLYCEMIA: ICD-10-CM

## 2021-07-12 DIAGNOSIS — R60.0 EDEMA OF RIGHT LOWER EXTREMITY: ICD-10-CM

## 2021-07-12 DIAGNOSIS — R60.0 EDEMA OF RIGHT LOWER EXTREMITY: Primary | ICD-10-CM

## 2021-07-12 PROCEDURE — 99213 OFFICE O/P EST LOW 20 MIN: CPT | Performed by: PHYSICIAN ASSISTANT

## 2021-07-12 PROCEDURE — 93971 EXTREMITY STUDY: CPT

## 2021-07-12 ASSESSMENT — ENCOUNTER SYMPTOMS
COUGH: 0
BACK PAIN: 0
DIARRHEA: 0
SINUS PRESSURE: 0
ABDOMINAL PAIN: 0
TROUBLE SWALLOWING: 0
VOMITING: 0
SHORTNESS OF BREATH: 0
CHEST TIGHTNESS: 0

## 2021-07-12 ASSESSMENT — PATIENT HEALTH QUESTIONNAIRE - PHQ9
SUM OF ALL RESPONSES TO PHQ QUESTIONS 1-9: 0
SUM OF ALL RESPONSES TO PHQ9 QUESTIONS 1 & 2: 0
1. LITTLE INTEREST OR PLEASURE IN DOING THINGS: 0
SUM OF ALL RESPONSES TO PHQ QUESTIONS 1-9: 0
2. FEELING DOWN, DEPRESSED OR HOPELESS: 0
SUM OF ALL RESPONSES TO PHQ QUESTIONS 1-9: 0

## 2021-07-12 NOTE — PROGRESS NOTES
Subjective:      Patient ID: Kenroy Ocasio is a 60 y.o. female who presents today for:  Chief Complaint   Patient presents with    Leg Swelling     Patient is here c/o leg swelling. Pt states R leg swells up and walking is difficult. Pt states pain is located in the back of the R leg. Pt states pain has been ongoing for about 3 days. Pt states she has used Tylenol with little relief. HPI  60year old female who presents with swelling to her right lower extremity. She states that it is slightly painful to the back of her leg. This leg has been swollen for the past three. She has taken tylenol with minimal relief. The patient states that she has been feeling dizzy and is worried her blood sugar is off. She is on glyburide     Past Medical History:   Diagnosis Date    [facility]     Congenital heart disease     Diabetes mellitus ([facility] [facility]     Hyperlipidemia     Hypertension      Past Surgical History:   Procedure Laterality Date     SECTION      CHOLECYSTECTOMY      KIDNEY STONE SURGERY       Social History     Socioeconomic History    Marital status:      Spouse name: Not on file    Number of children: Not on file    Years of education: Not on file    Highest education level: Not on file   Occupational History    Not on file   Tobacco Use    Smoking status: Never Smoker    Smokeless tobacco: Never Used   Substance and Sexual Activity    Alcohol use: Never    Drug use: No    Sexual activity: Not on file   Other Topics Concern    Not on file   Social History Narrative    Not on file     Social Determinants of Health     Financial Resource Strain: Low Risk     Difficulty of Paying Living Expenses: Not hard at all   Food Insecurity: No Food Insecurity    Worried About Running Out of Food in the Last Year: Never true    Dasha of Food in the Last Year: Never true   Transportation Needs: No Transportation Needs    Lack of Transportation (Medical):  No    Lack of Transportation (Non-Medical):  No   Physical Activity:     Days of Exercise per Week:     Minutes of Exercise per Session:    Stress:     Feeling of Stress :    Social Connections:     Frequency of Communication with Friends and Family:     Frequency of Social Gatherings with Friends and Family:     Attends Scientologist Services:     Active Member of Clubs or Organizations:     Attends Club or Organization Meetings:     Marital Status:    Intimate Partner Violence:     Fear of Current or Ex-Partner:     Emotionally Abused:     Physically Abused:     Sexually Abused:      Family History   Problem Relation Age of Onset    Heart Disease Mother     Diabetes Mother     Heart Attack Father     Diabetes Sister     Diabetes Brother     Diabetes Sister     Heart Disease Sister      No Known Allergies  Current Outpatient Medications   Medication Sig Dispense Refill    albuterol sulfate HFA (PROVENTIL HFA) 108 (90 Base) MCG/ACT inhaler Inhale 2 puffs into the lungs every 6 hours as needed for Wheezing or Shortness of Breath 1 Inhaler 3    fluticasone (FLONASE) 50 MCG/ACT nasal spray 1 spray by Each Nostril route daily 2 Bottle 1    glipiZIDE (GLUCOTROL XL) 5 MG extended release tablet TAKE ONE TABLET BY MOUTH TWO TIMES A DAY 60 tablet 2    FreeStyle Lancets MISC USE TO TEST ONCE TO TWICE DAILY AND AS NEEDED FOR SYMPTOMS OF IRREGULAR BLOOD GLUCOSE      celecoxib (CELEBREX) 100 MG capsule TAKE ONE CAPSULE BY MOUTH TWO TIMES A DAY      dilTIAZem (TIAZAC) 240 MG extended release capsule TAKE ONE CAPSULE BY MOUTH EVERY DAY 90 capsule 0    rosuvastatin (CRESTOR) 20 MG tablet Take 1 tablet by mouth daily 90 tablet 3    vitamin D (ERGOCALCIFEROL) 1.25 MG (95065 UT) CAPS capsule TAKE ONE CAPSULE BY MOUTH ONCE A WEEK 12 capsule 3    Blood Glucose Monitoring Suppl (ACURA BLOOD GLUCOSE METER) w/Device KIT 1 Device by Does not apply route 2 times daily 1 kit 0    blood glucose monitor strips Test 1 times a day & as needed for symptoms of irregular blood glucose. Dispense sufficient amount for indicated testing frequency plus additional to accommodate PRN testing needs. 100 strip 3    ondansetron (ZOFRAN) 4 MG tablet Take 1 tablet by mouth 3 times daily as needed for Nausea or Vomiting 15 tablet 0    metoprolol tartrate (LOPRESSOR) 25 MG tablet Take 25 mg by mouth daily      blood glucose monitor kit and supplies Test 2 times a day & as needed for symptoms of irregular blood glucose. Lancets and test strips to be included 1 kit 0    aspirin 81 MG tablet Take 81 mg by mouth daily.  nitroGLYCERIN (NITROSTAT) 0.4 MG SL tablet Place 0.4 mg under the tongue every 5 minutes as needed.  acetaminophen (TYLENOL ARTHRITIS PAIN) 650 MG CR tablet Take 650 mg by mouth every 8 hours as needed. No current facility-administered medications for this visit. Review of Systems   Constitutional: Negative for activity change, appetite change, chills, fever and unexpected weight change. HENT: Negative for drooling, ear pain, nosebleeds, sinus pressure and trouble swallowing. Respiratory: Negative for cough, chest tightness and shortness of breath. Cardiovascular: Negative for chest pain and leg swelling. Gastrointestinal: Negative for abdominal pain, diarrhea and vomiting. Endocrine: Negative for polydipsia and polyphagia. Genitourinary: Negative for dysuria, flank pain and frequency. Musculoskeletal: Positive for joint swelling (RLE). Negative for back pain and myalgias. Skin: Negative for pallor and rash. Neurological: Negative for syncope, weakness and headaches. Hematological: Does not bruise/bleed easily. Psychiatric/Behavioral: Negative for agitation and behavioral problems. All other systems reviewed and are negative.       Objective:   BP (!) 144/86 (Site: Right Upper Arm, Position: Sitting, Cuff Size: Small Adult)   Pulse 65   Temp 96.1 °F (35.6 °C)   Ht 5' (1.524 m)   Wt 163 lb (73.9 kg)   SpO2 97%   BMI 31.83 kg/m²     Physical Exam  Vitals and nursing note reviewed. Constitutional:       General: She is awake. She is not in acute distress. Appearance: Normal appearance. She is well-developed and normal weight. She is not ill-appearing, toxic-appearing or diaphoretic. Comments: No photophobia. No phonophobia. HENT:      Head: Normocephalic and atraumatic. No Porter's sign. Right Ear: External ear normal.      Left Ear: External ear normal.      Nose: Nose normal. No congestion or rhinorrhea. Mouth/Throat:      Mouth: Mucous membranes are moist.      Pharynx: Oropharynx is clear. No oropharyngeal exudate or posterior oropharyngeal erythema. Eyes:      General: No scleral icterus. Right eye: No foreign body or discharge. Left eye: No discharge. Extraocular Movements: Extraocular movements intact. Conjunctiva/sclera: Conjunctivae normal.      Left eye: No exudate. Pupils: Pupils are equal, round, and reactive to light. Neck:      Vascular: No JVD. Trachea: No tracheal deviation. Comments: No meningismus. Cardiovascular:      Rate and Rhythm: Normal rate and regular rhythm. Heart sounds: Normal heart sounds. Heart sounds not distant. No murmur heard. No friction rub. No gallop. Pulmonary:      Effort: Pulmonary effort is normal. No respiratory distress. Breath sounds: Normal breath sounds. No stridor. No wheezing, rhonchi or rales. Chest:      Chest wall: No tenderness. Abdominal:      General: Abdomen is flat. Bowel sounds are normal. There is no distension. Palpations: Abdomen is soft. There is no mass. Tenderness: There is no abdominal tenderness. There is no right CVA tenderness, left CVA tenderness, guarding or rebound. Hernia: No hernia is present. Musculoskeletal:         General: No swelling, tenderness, deformity or signs of injury. Normal range of motion.       Cervical patient/family: current clinical status & medications. Side effects of the medication prescribed today, as well as treatment plan/rationale and result expectations have been discussed with the patient/family who expresses understanding. Patient will be discharged home in stable condition. Follow up with PCP to evaluate treatment results or return if symptoms worsen or fail to improve. Discussed signs and symptoms which require immediate follow-up in ED/call to 911. Understanding verbalized. I have reviewed the patient's medical history in detail and updated the computerized patient record.     ANYI Romero

## 2021-07-12 NOTE — PATIENT INSTRUCTIONS
Patient Education        Leg and Ankle Edema: Care Instructions  Your Care Instructions  Swelling in the legs, ankles, and feet is called edema. It is common after you sit or stand for a while. Long plane flights or car rides often cause swelling in the legs and feet. You may also have swelling if you have to stand for long periods of time at your job. Problems with the veins in the legs (varicose veins) and changes in hormones can also cause swelling. Sometimes the swelling in the ankles and feet is caused by a more serious problem, such as heart failure, infection, blood clots, or liver or kidney disease. Follow-up care is a key part of your treatment and safety. Be sure to make and go to all appointments, and call your doctor if you are having problems. It's also a good idea to know your test results and keep a list of the medicines you take. How can you care for yourself at home? · If your doctor gave you medicine, take it as prescribed. Call your doctor if you think you are having a problem with your medicine. · Whenever you are resting, raise your legs up. Try to keep the swollen area higher than the level of your heart. · Take breaks from standing or sitting in one position. ? Walk around to increase the blood flow in your lower legs. ? Move your feet and ankles often while you stand, or tighten and relax your leg muscles. · Wear support stockings. Put them on in the morning, before swelling gets worse. · Eat a balanced diet. Lose weight if you need to. · Limit the amount of salt (sodium) in your diet. Salt holds fluid in the body and may increase swelling. When should you call for help? Call 911 anytime you think you may need emergency care. For example, call if:    · You have symptoms of a blood clot in your lung (called a pulmonary embolism). These may include:  ? Sudden chest pain. ? Trouble breathing. ? Coughing up blood.    Call your doctor now or seek immediate medical care if:    · You have signs of a blood clot, such as:  ? Pain in your calf, back of the knee, thigh, or groin. ? Redness and swelling in your leg or groin.     · You have symptoms of infection, such as:  ? Increased pain, swelling, warmth, or redness. ? Red streaks or pus. ? A fever. Watch closely for changes in your health, and be sure to contact your doctor if:    · Your swelling is getting worse.     · You have new or worsening pain in your legs.     · You do not get better as expected. Where can you learn more? Go to [email or web address] [email or web address] [email or web address] org and sign in to your [employer] account. Enter P750 in the [employer] box to learn more about \"Leg and Ankle Edema: Care Instructions. \"     If you do not have an account, please click on the \"Sign Up Now\" link. Current as of: October 19, 2020               Content Version: 12.9  © 2006-2021 [facility], [facility]. Care instructions adapted under license by [facility] ([facility]). If you have questions about a medical condition or this instruction, always ask your healthcare professional. [place] [address] any warranty or liability for your use of this information.

## 2021-07-15 DIAGNOSIS — R60.0 EDEMA OF LEFT LOWER EXTREMITY: Primary | ICD-10-CM

## 2021-07-15 DIAGNOSIS — M25.561 ACUTE PAIN OF RIGHT KNEE: ICD-10-CM

## 2021-07-15 DIAGNOSIS — R60.0 EDEMA OF RIGHT LOWER EXTREMITY: ICD-10-CM

## 2021-07-16 ENCOUNTER — TELEPHONE (OUTPATIENT)
Dept: FAMILY MEDICINE CLINIC | Age: 66
End: 2021-07-16

## 2021-07-16 NOTE — TELEPHONE ENCOUNTER
Outgoing Call    Call To: Isadora Wade    Call Reason:   Follow Up with patient on Call Center Encounter    Answered:  [x] Yes  [] No, left voicemail? [] yes [] no    Outcome:  Spoke with patient, she is having a lot of difficulty walking and her pain has increased. She states that the walk in clinic did nothing for her so she turned down coming in to be seen. She is going to go to the ER for treatment. Please be advised of outcome.  TY

## 2021-07-16 NOTE — TELEPHONE ENCOUNTER
Incoming Call    Caller:  Jen Denver    Communication (HIPPA):  HIPPA not applicable    Notes:  Patient contacted [facility] and requested an appointment to establish care. Patient was seen in walk in for leg pain and swelling. No providers have an open New Patient appointment. Attempting to contact the patient to advise to come to walk in clinic again.      Please Advise

## 2021-07-17 ENCOUNTER — VIRTUAL VISIT (OUTPATIENT)
Dept: FAMILY MEDICINE CLINIC | Age: 66
End: 2021-07-17
Payer: COMMERCIAL

## 2021-07-17 DIAGNOSIS — M79.604 RIGHT LEG PAIN: Primary | ICD-10-CM

## 2021-07-17 PROCEDURE — 99441 PR PHYS/QHP TELEPHONE EVALUATION 5-10 MIN: CPT | Performed by: PHYSICIAN ASSISTANT

## 2021-07-17 RX ORDER — PREDNISONE 10 MG/1
10 TABLET ORAL DAILY
Qty: 10 TABLET | Refills: 0 | Status: SHIPPED | OUTPATIENT
Start: 2021-07-17 | End: 2021-07-27

## 2021-07-17 ASSESSMENT — ENCOUNTER SYMPTOMS
VOMITING: 0
SHORTNESS OF BREATH: 0
DIARRHEA: 0
ABDOMINAL PAIN: 0
COUGH: 0
SINUS PRESSURE: 0
CHEST TIGHTNESS: 0
TROUBLE SWALLOWING: 0
BACK PAIN: 0

## 2021-07-17 NOTE — PROGRESS NOTES
TELEHEALTH EVALUATION -- Audio/Visual (During ZHGPA- public health emergency)    Anna Corrales is a 60 y.o. female being evaluated by a Virtual Visit (video visit) encounter to address concerns as mentioned above. A caregiver was present when appropriate. Due to this being a TeleHealth encounter (During  public health emergency), evaluation of the following organ systems was limited: Vitals/Constitutional/EENT/Resp/CV/GI//MS/Neuro/Skin/Heme-Lymph-Imm. Pursuant to the emergency declaration under the [facility], [address] [place] [address] authority and the [employer] Act, this Virtual Visit was conducted with patient's (and/or legal guardian's) consent, to reduce the patient's risk of exposure to COVID-19 and provide necessary medical care. The patient (and/or legal guardian) has also been advised to contact this office for worsening conditions or problems, and seek emergency medical treatment and/or call 911 if deemed necessary. Patient was contacted and agreed to proceed with a virtual visit via Telephone Visit  The risks and benefits of converting to a virtual visit were discussed in light of the current infectious disease epidemic. Patient also understood that insurance coverage and co-pays are up to their individual insurance plans. Patient was located at their home. Provider was located at their office. 2021  Anna Corrales (:  1955) has requested an audio/video evaluation for the following concern(s):    HPI  60year old female who was seen in office this past week and seen in the ED yesterday for right leg pain. She had a negative venous duplex and a negative x-ray. She has an ortho consult scheduled and an appointment with ESTHER Donaldson in two days. She states that the pain is so bad she cannot walk. Patient has muscle relaxant and pain meds.      Review of Systems Constitutional: Negative for activity change, appetite change, chills, fever and unexpected weight change. HENT: Negative for drooling, ear pain, nosebleeds, sinus pressure and trouble swallowing. Respiratory: Negative for cough, chest tightness and shortness of breath. Cardiovascular: Negative for chest pain and leg swelling. Gastrointestinal: Negative for abdominal pain, diarrhea and vomiting. Endocrine: Negative for polydipsia and polyphagia. Genitourinary: Negative for dysuria, flank pain and frequency. Musculoskeletal: Positive for arthralgias (right lower extremity). Negative for back pain and myalgias. Skin: Negative for pallor and rash. Neurological: Negative for syncope, weakness and headaches. Hematological: Does not bruise/bleed easily. Psychiatric/Behavioral: Negative for agitation, behavioral problems and confusion. All other systems reviewed and are negative. Prior to Visit Medications    Medication Sig Taking?  Authorizing Provider   predniSONE (DELTASONE) 10 MG tablet Take 1 tablet by mouth daily for 10 days Yes ANYI Diggs   albuterol sulfate HFA (PROVENTIL HFA) 108 (90 Base) MCG/ACT inhaler Inhale 2 puffs into the lungs every 6 hours as needed for Wheezing or Shortness of Breath Yes EJ Rosenbaum CNP   fluticasone (FLONASE) 50 MCG/ACT nasal spray 1 spray by Each Nostril route daily Yes EJ Rosenbaum CNP   glipiZIDE (GLUCOTROL XL) 5 MG extended release tablet TAKE ONE TABLET BY MOUTH TWO TIMES A DAY Yes Melony Ortiz MD   FreeStyle Lancets MISC USE TO TEST ONCE TO TWICE DAILY AND AS NEEDED FOR SYMPTOMS OF IRREGULAR BLOOD GLUCOSE Yes Historical Provider, MD   celecoxib (CELEBREX) 100 MG capsule TAKE ONE CAPSULE BY MOUTH TWO TIMES A DAY Yes Historical Provider, MD   dilTIAZem (TIAZAC) 240 MG extended release capsule TAKE ONE CAPSULE BY MOUTH EVERY DAY Yes Melony Ortiz MD   rosuvastatin (CRESTOR) 20 MG tablet Take 1 tablet by mouth daily Yes Carrie Srinivasan MD   vitamin D (ERGOCALCIFEROL) 1.25 MG (82803 UT) CAPS capsule TAKE ONE CAPSULE BY MOUTH ONCE A WEEK Yes Carrie Srinivasan MD   Blood Glucose Monitoring Suppl (ACURA BLOOD GLUCOSE METER) w/Device KIT 1 Device by Does not apply route 2 times daily Yes Carrie Srinivasan MD   blood glucose monitor strips Test 1 times a day & as needed for symptoms of irregular blood glucose. Dispense sufficient amount for indicated testing frequency plus additional to accommodate PRN testing needs. Yes Carrie Srinivasan MD   ondansetron (ZOFRAN) 4 MG tablet Take 1 tablet by mouth 3 times daily as needed for Nausea or Vomiting Yes José Miguel Gupta PA-C   metoprolol tartrate (LOPRESSOR) 25 MG tablet Take 25 mg by mouth daily Yes Historical Provider, MD   blood glucose monitor kit and supplies Test 2 times a day & as needed for symptoms of irregular blood glucose. Lancets and test strips to be included Yes EJ Gomez CNP   aspirin 81 MG tablet Take 81 mg by mouth daily. Yes Historical Provider, MD   nitroGLYCERIN (NITROSTAT) 0.4 MG SL tablet Place 0.4 mg under the tongue every 5 minutes as needed. Yes Historical Provider, MD   acetaminophen (TYLENOL ARTHRITIS PAIN) 650 MG CR tablet Take 650 mg by mouth every 8 hours as needed.    Yes Historical Provider, MD       Past Medical History:   Diagnosis Date    Atrial fibrillation ([facility]     Congenital heart disease     Diabetes mellitus ([facility]     Hyperlipidemia     Hypertension      Past Surgical History:   Procedure Laterality Date     SECTION      CHOLECYSTECTOMY      KIDNEY STONE SURGERY       Social History     Socioeconomic History    Marital status:      Spouse name: Not on file    Number of children: Not on file    Years of education: Not on file    Highest education level: Not on file   Occupational History    Not on file   Tobacco Use    Smoking status: Never Smoker    Smokeless tobacco: Never Used Substance and Sexual Activity    Alcohol use: Never    Drug use: No    Sexual activity: Not on file   Other Topics Concern    Not on file   Social History Narrative    Not on file     Social Determinants of Health     Financial Resource Strain: Low Risk     Difficulty of Paying Living Expenses: Not hard at all   Food Insecurity: No Food Insecurity    Worried About Running Out of Food in the Last Year: Never true    Dasha of Food in the Last Year: Never true   Transportation Needs: No Transportation Needs    Lack of Transportation (Medical): No    Lack of Transportation (Non-Medical): No   Physical Activity:     Days of Exercise per Week:     Minutes of Exercise per Session:    Stress:     Feeling of Stress :    Social Connections:     Frequency of Communication with Friends and Family:     Frequency of Social Gatherings with Friends and Family:     Attends Yazidism Services:     Active Member of Clubs or Organizations:     Attends Club or Organization Meetings:     Marital Status:    Intimate Partner Violence:     Fear of Current or Ex-Partner:     Emotionally Abused:     Physically Abused:     Sexually Abused:      Family History   Problem Relation Age of Onset    Heart Disease Mother     Diabetes Mother     Heart Attack Father     Diabetes Sister     Diabetes Brother     Diabetes Sister     Heart Disease Sister      No Known Allergies    PMH, Surgical Hx, Family Hx, and Social Hx reviewed and updated. Health Maintenance reviewed.     PHYSICAL EXAMINATION:  \"[x]\" Indicates a positive item  \"[]\" Indicates a negative item    Vital Signs: (As obtained by patient/caregiver or practitioner observation)    Blood pressure-  Heart rate-    Respiratory rate-    Temperature-  Pulse oximetry-     Constitutional: [x] Appears well-developed and well-nourished [x] No apparent distress      [] Abnormal-   Mental status  [x] Alert and awake  [x] Oriented to person/place/time [x]Able to follow commands      Eyes:  EOM    []  Normal  [] Abnormal-  Sclera  [x]  Normal  [] Abnormal -         Discharge [x]  None visible  [] Abnormal -    HENT:   [x] Normocephalic, atraumatic. [] Abnormal   [x] Mouth/Throat: Mucous membranes are moist.     External Ears [x] Normal  [] Abnormal-     Neck: [x] No visualized mass     Pulmonary/Chest: [x] Respiratory effort normal.  [x] No visualized signs of difficulty breathing or respiratory distress        [] Abnormal-      Musculoskeletal:   [x] Normal gait with no signs of ataxia         [x] Normal range of motion of neck        [] Abnormal-       Neurological:       [x] No Facial Asymmetry (Cranial nerve 7 motor function) (limited exam to video visit)          [x] No gaze palsy        [] Abnormal-         Skin:        [x] No significant exanthematous lesions or discoloration noted on facial skin         [] Abnormal-            Psychiatric:       [x] Normal Affect [x] No Hallucinations        [] Abnormal-     Other pertinent observable physical exam findings-   Results for orders placed or performed in visit on 04/27/21   COVID-19 Ambulatory    Specimen: Nasopharyngeal Swab   Result Value Ref Range    SARS-CoV-2 Not Detected Not Detected    Source Anterior nares        ASSESSMENT/PLAN:  Assessment & Plan   Kay Jorgensen was seen today for leg pain. Diagnoses and all orders for this visit:    Right leg pain    Other orders  -     predniSONE (DELTASONE) 10 MG tablet; Take 1 tablet by mouth daily for 10 days      The patient was instructed to go to her ortho consult and see ESTHER Donaldson at her up coming appointment. She was instructed to go to the ED for any worsening symptoms. States that she understands and will comply. No orders of the defined types were placed in this encounter.     Orders Placed This Encounter   Medications    predniSONE (DELTASONE) 10 MG tablet     Sig: Take 1 tablet by mouth daily for 10 days     Dispense:  10 tablet     Refill:  0     There are no discontinued medications. Return if symptoms worsen or fail to improve. Reviewed with the patient: current clinical status, medications, activities and diet. Side effects, adverse effects of the medication prescribed today, as well as treatment plan/ rationale and result expectations have been discussed with the patient who expresses understanding and desires to proceed. Close follow up to evaluate treatment results and for coordination of care. I have reviewed the patient's medical history in detail and updated the computerized patient record. Patient identification was verified at the start of the visit: Yes    Total time spent on this encounter: Not billed by time      --ANYI Rosales on 7/17/2021 at 3:52 PM    An electronic signature was used to authenticate this note.

## 2021-07-17 NOTE — PATIENT INSTRUCTIONS
Patient Education        Leg Pain: Care Instructions  Your Care Instructions  Many things can cause leg pain. Too much exercise or overuse can cause a muscle cramp (or charley horse). You can get leg cramps from not eating a balanced diet that has enough potassium, calcium, and other minerals. If you do not drink enough fluids or are taking certain medicines, you may develop leg cramps. Other causes of leg pain include injuries, blood flow problems, nerve damage, and twisted and enlarged veins (varicose veins). You can usually ease pain with self-care. Your doctor may recommend that you rest your leg and keep it elevated. Follow-up care is a key part of your treatment and safety. Be sure to make and go to all appointments, and call your doctor if you are having problems. It's also a good idea to know your test results and keep a list of the medicines you take. How can you care for yourself at home? · Take pain medicines exactly as directed. ? If the doctor gave you a prescription medicine for pain, take it as prescribed. ? If you are not taking a prescription pain medicine, ask your doctor if you can take an over-the-counter medicine. · Take any other medicines exactly as prescribed. Call your doctor if you think you are having a problem with your medicine. · Rest your leg while you have pain, and avoid standing for long periods of time. · Prop up your leg at or above the level of your heart when possible. · Make sure you are eating a balanced diet that is rich in calcium, potassium, and magnesium, especially if you are pregnant. · If directed by your doctor, put ice or a cold pack on the area for 10 to 20 minutes at a time. Put a thin cloth between the ice and your skin. · Your leg may be in a splint, a brace, or an elastic bandage, and you may have crutches to help you walk. Follow your doctor's directions about how long to wear supports and how to use the crutches. When should you call for help? Call 911 anytime you think you may need emergency care. For example, call if:    · You have sudden chest pain and shortness of breath, or you cough up blood.     · Your leg is cool or pale or changes color. Call your doctor now or seek immediate medical care if:    · You have increasing or severe pain.     · Your leg suddenly feels weak and you cannot move it.     · You have signs of a blood clot, such as:  ? Pain in your calf, back of the knee, thigh, or groin. ? Redness and swelling in your leg or groin.     · You have signs of infection, such as:  ? Increased pain, swelling, warmth, or redness. ? Red streaks leading from the sore area. ? Pus draining from a place on your leg. ? A fever.     · You cannot bear weight on your leg. Watch closely for changes in your health, and be sure to contact your doctor if:    · You do not get better as expected. Where can you learn more? Go to [email or web address]://Michelle [email or web address] [email or web address].[email or web address] org and sign in to your [employer]Source account. Enter U276 in the [employer] box to learn more about \"Leg Pain: Care Instructions. \"     If you do not have an account, please click on the \"Sign Up Now\" link. Current as of: October 19, 2020               Content Version: 12.9  © 2006-2021 [email or web address]. Care instructions adapted under license by [facility] ([facility]). If you have questions about a medical condition or this instruction, always ask your healthcare professional. [place] [address] any warranty or liability for your use of this information. Patient Education        Leg Pain: Care Instructions  Your Care Instructions  Many things can cause leg pain. Too much exercise or overuse can cause a muscle cramp (or charley horse). You can get leg cramps from not eating a balanced diet that has enough potassium, calcium, and other minerals. If you do not drink enough fluids or are taking certain medicines, you may develop leg cramps.  Other causes of leg pain include injuries, blood flow problems, nerve damage, and twisted and enlarged veins (varicose veins). You can usually ease pain with self-care. Your doctor may recommend that you rest your leg and keep it elevated. Follow-up care is a key part of your treatment and safety. Be sure to make and go to all appointments, and call your doctor if you are having problems. It's also a good idea to know your test results and keep a list of the medicines you take. How can you care for yourself at home? · Take pain medicines exactly as directed. ? If the doctor gave you a prescription medicine for pain, take it as prescribed. ? If you are not taking a prescription pain medicine, ask your doctor if you can take an over-the-counter medicine. · Take any other medicines exactly as prescribed. Call your doctor if you think you are having a problem with your medicine. · Rest your leg while you have pain, and avoid standing for long periods of time. · Prop up your leg at or above the level of your heart when possible. · Make sure you are eating a balanced diet that is rich in calcium, potassium, and magnesium, especially if you are pregnant. · If directed by your doctor, put ice or a cold pack on the area for 10 to 20 minutes at a time. Put a thin cloth between the ice and your skin. · Your leg may be in a splint, a brace, or an elastic bandage, and you may have crutches to help you walk. Follow your doctor's directions about how long to wear supports and how to use the crutches. When should you call for help? Call 911 anytime you think you may need emergency care. For example, call if:    · You have sudden chest pain and shortness of breath, or you cough up blood.     · Your leg is cool or pale or changes color.    Call your doctor now or seek immediate medical care if:    · You have increasing or severe pain.     · Your leg suddenly feels weak and you cannot move it.     · You have signs of a blood clot, such as:  ? Pain in your calf, back of the knee, thigh, or groin. ? Redness and swelling in your leg or groin.     · You have signs of infection, such as:  ? Increased pain, swelling, warmth, or redness. ? Red streaks leading from the sore area. ? Pus draining from a place on your leg. ? A fever.     · You cannot bear weight on your leg. Watch closely for changes in your health, and be sure to contact your doctor if:    · You do not get better as expected. Where can you learn more? Go to [email or web address] PharmapeRedox [email or web address] org and sign in to your [email or web address] account. Enter B107 in the [facility] box to learn more about \"Leg Pain: Care Instructions. \"     If you do not have an account, please click on the \"Sign Up Now\" link. Current as of: October 19, 2020               Content Version: 12.9  © 0265-1832 [employer]wise, [facility]. Care instructions adapted under license by [facility] ([facility]). If you have questions about a medical condition or this instruction, always ask your healthcare professional. [place] [address] any warranty or liability for your use of this information.

## 2021-07-19 ENCOUNTER — OFFICE VISIT (OUTPATIENT)
Dept: FAMILY MEDICINE CLINIC | Age: 66
End: 2021-07-19
Payer: COMMERCIAL

## 2021-07-19 ENCOUNTER — NURSE TRIAGE (OUTPATIENT)
Dept: OTHER | Facility: CLINIC | Age: 66
End: 2021-07-19

## 2021-07-19 VITALS
DIASTOLIC BLOOD PRESSURE: 84 MMHG | SYSTOLIC BLOOD PRESSURE: 136 MMHG | HEIGHT: 60 IN | HEART RATE: 71 BPM | BODY MASS INDEX: 32 KG/M2 | TEMPERATURE: 98.1 F | WEIGHT: 163 LBS | OXYGEN SATURATION: 97 %

## 2021-07-19 DIAGNOSIS — I83.813 VARICOSE VEINS OF BILATERAL LOWER EXTREMITIES WITH PAIN: ICD-10-CM

## 2021-07-19 DIAGNOSIS — M79.604 RIGHT LEG PAIN: Primary | ICD-10-CM

## 2021-07-19 PROCEDURE — 99213 OFFICE O/P EST LOW 20 MIN: CPT | Performed by: PHYSICIAN ASSISTANT

## 2021-07-19 RX ORDER — MELOXICAM 15 MG/1
15 TABLET ORAL
COMMUNITY

## 2021-07-19 NOTE — PROGRESS NOTES
[address] [facility] Encounter  CHIEF COMPLAINT       Chief Complaint   Patient presents with    Leg Pain     x 7 days        HISTORY OF PRESENT ILLNESS   Ho Armas is a 60 y.o. female who presents with:  HPI   Just started prednisone. Varicose veins. Painful to the veins  Patient is not compliant with eliana hose stocking. No redness no septic. No fevers. Wants to wait on the stockings  Patient was seen in [facility] and ED. Negative DVT. Here for work note. REVIEW OF SYSTEMS     Review of Systems   Constitutional: Negative for activity change, appetite change, chills and fever. HENT: Negative for congestion, drooling, sinus pressure, sinus pain and sore throat. Eyes: Negative for visual disturbance. Respiratory: Negative for cough, chest tightness and shortness of breath. Cardiovascular: Negative for chest pain. Gastrointestinal: Negative for abdominal pain, diarrhea, nausea and vomiting. Endocrine: Negative for cold intolerance. Genitourinary: Negative for dysuria, flank pain, frequency and hematuria. Musculoskeletal: Positive for arthralgias (right knee). Negative for back pain. Skin: Negative for rash. Allergic/Immunologic: Negative for food allergies. Neurological: Negative for weakness, light-headedness, numbness and headaches. Hematological: Does not bruise/bleed easily. PAST MEDICAL HISTORY         Diagnosis Date    Atrial fibrillation ([address]     Congenital heart disease     Diabetes mellitus ([address]     Hyperlipidemia     Hypertension      SURGICAL HISTORY     Patient  has a past surgical history that includes  section (); Kidney stone surgery (); and Cholecystectomy. CURRENT MEDICATIONS       Previous Medications    ACETAMINOPHEN (TYLENOL ARTHRITIS PAIN) 650 MG CR TABLET    Take 650 mg by mouth every 8 hours as needed.       ALBUTEROL SULFATE HFA (PROVENTIL HFA) 108 (90 BASE) MCG/ACT INHALER    Inhale 2 puffs into the lungs every 6 hours as needed for Wheezing or Shortness of Breath    ASPIRIN 81 MG TABLET    Take 81 mg by mouth daily. BLOOD GLUCOSE MONITOR KIT AND SUPPLIES    Test 2 times a day & as needed for symptoms of irregular blood glucose. Lancets and test strips to be included    BLOOD GLUCOSE MONITOR STRIPS    Test 1 times a day & as needed for symptoms of irregular blood glucose. Dispense sufficient amount for indicated testing frequency plus additional to accommodate PRN testing needs. BLOOD GLUCOSE MONITORING SUPPL (ACURA BLOOD GLUCOSE METER) W/DEVICE KIT    1 Device by Does not apply route 2 times daily    CELECOXIB (CELEBREX) 100 MG CAPSULE    TAKE ONE CAPSULE BY MOUTH TWO TIMES A DAY    DILTIAZEM (TIAZAC) 240 MG EXTENDED RELEASE CAPSULE    TAKE ONE CAPSULE BY MOUTH EVERY DAY    FLUTICASONE (FLONASE) 50 MCG/ACT NASAL SPRAY    1 spray by Each Nostril route daily    FREESTYLE LANCETS MISC    USE TO TEST ONCE TO TWICE DAILY AND AS NEEDED FOR SYMPTOMS OF IRREGULAR BLOOD GLUCOSE    GLIPIZIDE (GLUCOTROL XL) 5 MG EXTENDED RELEASE TABLET    TAKE ONE TABLET BY MOUTH TWO TIMES A DAY    MELOXICAM (MOBIC) 15 MG TABLET    Take 15 mg by mouth as needed for Pain    METOPROLOL TARTRATE (LOPRESSOR) 25 MG TABLET    Take 25 mg by mouth daily    NITROGLYCERIN (NITROSTAT) 0.4 MG SL TABLET    Place 0.4 mg under the tongue every 5 minutes as needed. ONDANSETRON (ZOFRAN) 4 MG TABLET    Take 1 tablet by mouth 3 times daily as needed for Nausea or Vomiting    PREDNISONE (DELTASONE) 10 MG TABLET    Take 1 tablet by mouth daily for 10 days    ROSUVASTATIN (CRESTOR) 20 MG TABLET    Take 1 tablet by mouth daily    VITAMIN D (ERGOCALCIFEROL) 1.25 MG (35101 UT) CAPS CAPSULE    TAKE ONE CAPSULE BY MOUTH ONCE A WEEK     ALLERGIES     Patient is has No Known Allergies. FAMILY HISTORY     Patient'sfamily history includes Diabetes in her brother, mother, sister, and sister;  Heart Attack in her father; Heart Disease in her mother and sister. SOCIAL HISTORY     Patient  reports that she has never smoked. She has never used smokeless tobacco. She reports that she does not drink alcohol and does not use drugs. PHYSICAL EXAM     VITALS  BP: 136/84, Temp: 98.1 °F (36.7 °C), Pulse: 71,  , SpO2: 97 %  Physical Exam  Vitals and nursing note reviewed. Constitutional:       General: She is awake. She is not in acute distress. Appearance: Normal appearance. She is well-developed. She is not ill-appearing, toxic-appearing or diaphoretic. HENT:      Head: Normocephalic and atraumatic. Right Ear: Hearing and external ear normal.      Left Ear: Hearing and external ear normal.      Nose: Nose normal.   Eyes:      General: Lids are normal. Vision grossly intact. Gaze aligned appropriately. Conjunctiva/sclera: Conjunctivae normal.   Cardiovascular:      Rate and Rhythm: Normal rate and regular rhythm. Pulses: Normal pulses. Heart sounds: Normal heart sounds, S1 normal and S2 normal.   Pulmonary:      Effort: Pulmonary effort is normal.      Breath sounds: Normal breath sounds and air entry. Musculoskeletal:      Cervical back: Normal range of motion. Right knee: Normal.      Left knee: Normal.      Comments: No instability No point tenderness. Patient does have tenderness to palpation of the right varicosities. Skin:     General: Skin is warm. Capillary Refill: Capillary refill takes less than 2 seconds. Neurological:      Mental Status: She is alert and oriented to person, place, and time. Gait: Gait is intact. Psychiatric:         Attention and Perception: Attention normal.         Mood and Affect: Mood normal.         Speech: Speech normal.         Behavior: Behavior normal. Behavior is cooperative. READY CARE COURSE   Labs:  No results found for this visit on 07/19/21. IMAGING:  No orders to display     Scheduled Meds:  Continuous Infusions:  PRN Meds:. PROCEDURES:  FINAL IMPRESSION      1.  Right leg pain    2. Varicose veins of bilateral lower extremities with pain      DISPOSITION/PLAN   1,2. Patient is here for work note. Informed patient to start rest, ice, compress, and elevation of the right leg. Patient does not want compression stockings at this time. Discussed signs and symptoms which require immediate follow-up in ED/call to 911. Patient verbalized understanding. Patient was encouraged to f/u with PCP appointment on 7/20/21. Patient would benefit with referral to vascular specialist.     On this date 7/19/2021 I have spent 20 minutes reviewing previous notes, test results and face to face with the patient discussing the diagnosis and importance of compliance with the treatment plan as well as documenting on the day of the visit. PATIENT REFERRED TO:  Return for Follow up with PCP. DISCHARGE MEDICATIONS:  New Prescriptions    No medications on file     Cannot display discharge medications since this is not an admission.        Torres Chang

## 2021-07-19 NOTE — TELEPHONE ENCOUNTER
Reason for Disposition   Thigh or calf pain and only 1 side and present > 1 hour    Answer Assessment - Initial Assessment Questions  1. ONSET: \"When did the swelling start? \" (e.g., minutes, hours, days)      1 week ago. 2. LOCATION: \"What part of the leg is swollen? \"  \"Are both legs swollen or just one leg? \"  Bilateral knees. She states her varicose veins on the R leg are big and swollen. 3. SEVERITY: \"How bad is the swelling? \" (e.g., localized; mild, moderate, severe)   - Localized - small area of swelling localized to one leg   - MILD pedal edema - swelling limited to foot and ankle, pitting edema < 1/4 inch (6 mm) deep, rest and elevation eliminate most or all swelling   - MODERATE edema - swelling of lower leg to knee, pitting edema > 1/4 inch (6 mm) deep, rest and elevation only partially reduce swelling   - SEVERE edema - swelling extends above knee, facial or hand swelling present   Mild after icing and elevating. 4. REDNESS: \"Does the swelling look red or infected? \"      Redness in the R knee. 5. PAIN: \"Is the swelling painful to touch? \" If so, ask: \"How painful is it? \"   (Scale 1-10; mild, moderate or severe)      R leg pain. It is painful to the touch. 9/10.     6. FEVER: \"Do you have a fever? \" If so, ask: \"What is it, how was it measured, and when did it start? \"   Denies. 7. CAUSE: \"What do you think is causing the leg swelling? \"      Pt was told it could be her varicose veins. 8. MEDICAL HISTORY: \"Do you have a history of heart failure, kidney disease, liver failure, or cancer? \"      Denies. 9. RECURRENT SYMPTOM: \"Have you had leg swelling before? \" If so, ask: \"When was the last time? \" \"What happened that time? \"      Denies. 10. OTHER SYMPTOMS: \"Do you have any other symptoms? \" (e.g., chest pain, difficulty breathing)        Denies. 11. PREGNANCY: \"Is there any chance you are pregnant? \" \"When was your last menstrual period? \"        N/A    Protocols used: LEG SWELLING AND EDEMA-ADULT-OH    Received call from Tate Ornelas at [facility] with [facility]. Brief description of triage: Leg swelling and edema, see above. Writer attempted to call [place] at [phone] and [phone]. Phone rang busy. Writer called office at 0014 and was on hold until [phone] until the call was disconnected. Triage indicates for patient to go to [facility] now, 2nd level triage per Donato Akhtar NP. If you develop  New or worsening symptoms, please call us back. Care advice provided, patient verbalizes understanding; denies any other questions or concerns; instructed to call back for any new or worsening symptoms. Attention Provider: Thank you for allowing me to participate in the care of your patient. The patient was connected to triage in response to information provided to the [facility]. Please do not respond through this encounter as the response is not directed to a shared pool.

## 2021-07-19 NOTE — LETTER
[address] [id number]  Phone: [phone]  Fax: [address] [place], [place]        July 19, 2021     Patient: Chino Gautam   YOB: 1955   Date of Visit: 7/19/2021       To Whom it May Concern:    Chino Gautam was seen in my clinic on 7/19/2021. She may return to work on 07/20. If you have any questions or concerns, please don't hesitate to call.     Sincerely,         ANYI Perea

## 2021-07-20 ENCOUNTER — OFFICE VISIT (OUTPATIENT)
Dept: FAMILY MEDICINE CLINIC | Age: 66
End: 2021-07-20
Payer: COMMERCIAL

## 2021-07-20 VITALS
HEART RATE: 89 BPM | DIASTOLIC BLOOD PRESSURE: 68 MMHG | WEIGHT: 163 LBS | OXYGEN SATURATION: 99 % | BODY MASS INDEX: 32 KG/M2 | SYSTOLIC BLOOD PRESSURE: 106 MMHG | TEMPERATURE: 97.4 F | RESPIRATION RATE: 16 BRPM | HEIGHT: 60 IN

## 2021-07-20 DIAGNOSIS — I83.891 SYMPTOMATIC VARICOSE VEINS, RIGHT: Primary | ICD-10-CM

## 2021-07-20 PROCEDURE — 99214 OFFICE O/P EST MOD 30 MIN: CPT | Performed by: NURSE PRACTITIONER

## 2021-07-20 RX ORDER — CYCLOBENZAPRINE HCL 10 MG
TABLET ORAL
COMMUNITY
Start: 2021-07-16 | End: 2021-07-26

## 2021-07-20 ASSESSMENT — ENCOUNTER SYMPTOMS
DIARRHEA: 0
ABDOMINAL PAIN: 0
NAUSEA: 0
VOMITING: 0
COUGH: 0
BACK PAIN: 0
CHEST TIGHTNESS: 0
SINUS PAIN: 0
SORE THROAT: 0
SINUS PRESSURE: 0
SHORTNESS OF BREATH: 0

## 2021-07-20 ASSESSMENT — VISUAL ACUITY: OU: 1

## 2021-07-20 NOTE — PROGRESS NOTES
Subjective:     Patient ID: Yola Mendoza is a 60 y.o. female who presentstoday for:  Chief Complaint   Patient presents with    Leg Pain     Pt. is here for a f/u from the walk in clinic on 07/12/2021 for right leg pain. Pt. was seen by Dr. Nathan Verduzco on 07/19/2021 and was prescribed Meloxicam and Prednisone. Pt. states she has not had any relief. Leg Pain   The incident occurred more than 1 week ago. There was no injury mechanism. The pain is present in the right leg. The quality of the pain is described as aching and cramping. The pain is moderate. The pain has been worsening since onset. Pertinent negatives include no loss of motion, loss of sensation, muscle weakness, numbness or tingling. She reports no foreign bodies present. The symptoms are aggravated by palpation and weight bearing. She has tried elevation, ice, heat, immobilization, NSAIDs and rest for the symptoms. The treatment provided mild relief.        Past Medical History:   Diagnosis Date    Atrial fibrillation ([facility]     Congenital heart disease     Diabetes mellitus ([facility]     Hyperlipidemia     Hypertension      Current Outpatient Medications on File Prior to Visit   Medication Sig Dispense Refill    cyclobenzaprine (FLEXERIL) 10 MG tablet Take by mouth      meloxicam (MOBIC) 15 MG tablet Take 15 mg by mouth as needed for Pain      predniSONE (DELTASONE) 10 MG tablet Take 1 tablet by mouth daily for 10 days 10 tablet 0    albuterol sulfate HFA (PROVENTIL HFA) 108 (90 Base) MCG/ACT inhaler Inhale 2 puffs into the lungs every 6 hours as needed for Wheezing or Shortness of Breath 1 Inhaler 3    fluticasone (FLONASE) 50 MCG/ACT nasal spray 1 spray by Each Nostril route daily 2 Bottle 1    glipiZIDE (GLUCOTROL XL) 5 MG extended release tablet TAKE ONE TABLET BY MOUTH TWO TIMES A DAY 60 tablet 2    FreeStyle Lancets MISC USE TO TEST ONCE TO TWICE DAILY AND AS NEEDED FOR SYMPTOMS OF IRREGULAR BLOOD GLUCOSE      dilTIAZem (TIAZAC) 240 MG Use    Smoking status: Never Smoker    Smokeless tobacco: Never Used   Substance and Sexual Activity    Alcohol use: Never    Drug use: No    Sexual activity: Not on file   Other Topics Concern    Not on file   Social History Narrative    Not on file     Social Determinants of Health     Financial Resource Strain: Low Risk     Difficulty of Paying Living Expenses: Not hard at all   Food Insecurity: No Food Insecurity    Worried About Running Out of Food in the Last Year: Never true    Dasha of Food in the Last Year: Never true   Transportation Needs: No Transportation Needs    Lack of Transportation (Medical): No    Lack of Transportation (Non-Medical): No   Physical Activity:     Days of Exercise per Week:     Minutes of Exercise per Session:    Stress:     Feeling of Stress :    Social Connections:     Frequency of Communication with Friends and Family:     Frequency of Social Gatherings with Friends and Family:     Attends Christian Services:     Active Member of Clubs or Organizations:     Attends Club or Organization Meetings:     Marital Status:    Intimate Partner Violence:     Fear of Current or Ex-Partner:     Emotionally Abused:     Physically Abused:     Sexually Abused: Allergies:  Patient has no known allergies. Review of Systems   Constitutional: Negative for activity change, appetite change, chills and fever. HENT: Negative for congestion, drooling, sinus pressure, sinus pain and sore throat. Eyes: Negative for visual disturbance. Respiratory: Negative for cough, chest tightness and shortness of breath. Cardiovascular: Negative for chest pain. Gastrointestinal: Negative for abdominal pain, diarrhea, nausea and vomiting. Endocrine: Negative for cold intolerance. Genitourinary: Negative for dysuria, flank pain, frequency and hematuria. Musculoskeletal: Positive for arthralgias and gait problem. Negative for back pain and joint swelling.    Skin: Negative for rash and wound. Allergic/Immunologic: Negative for food allergies. Neurological: Negative for tingling, weakness, light-headedness, numbness and headaches. Hematological: Does not bruise/bleed easily. Objective:    /68 (Site: Left Upper Arm, Position: Sitting, Cuff Size: Medium Adult)   Pulse 89   Temp 97.4 °F (36.3 °C) (Temporal)   Resp 16   Ht 5' (1.524 m)   Wt 163 lb (73.9 kg)   SpO2 99%   Breastfeeding No   BMI 31.83 kg/m²     Physical Exam  Constitutional:       General: She is not in acute distress. HENT:      Head: Normocephalic and atraumatic. Right Ear: External ear normal.      Left Ear: External ear normal.      Mouth/Throat:      Mouth: Mucous membranes are moist.   Eyes:      Conjunctiva/sclera: Conjunctivae normal.   Cardiovascular:      Rate and Rhythm: Normal rate and regular rhythm. Heart sounds: Normal heart sounds. Comments: Minimal BLE edema, non-pitting. Large varicose veins present. No erythema. Patient had negative US and xray. Pulmonary:      Effort: Pulmonary effort is normal. No respiratory distress. Breath sounds: Normal breath sounds. Musculoskeletal:         General: Swelling and tenderness present. Cervical back: Normal range of motion and neck supple. No tenderness. Right lower leg: Edema present. Left lower leg: Edema present. Lymphadenopathy:      Cervical: No cervical adenopathy. Skin:     General: Skin is warm and dry. Findings: No erythema. Neurological:      General: No focal deficit present. Mental Status: She is alert and oriented to person, place, and time. Assessment & Plan:       Diagnosis Orders   1.  Symptomatic varicose veins, right  Ambulatory referral to Interventional Radiology    Compression Stockings MISC     Orders Placed This Encounter   Procedures    Ambulatory referral to Interventional Radiology     Referral Priority:   Routine     Referral Type:   Eval and Treat     Referral Reason:   Specialty Services Required     Requested Specialty:   Radiology     Number of Visits Requested:   1     Orders Placed This Encounter   Medications    Compression Stockings MISC     Sig: by Does not apply route Bilateral Knee high   20-30mmHg  On in AM off at HS     Dispense:  1 each     Refill:  0     Medications Discontinued During This Encounter   Medication Reason    celecoxib (CELEBREX) 100 MG capsule LIST CLEANUP     Return in about 4 weeks (around 8/17/2021). Reviewed with the patient: currentclinical status, medications, activities and diet. Side effects, adverse effects of the medicationprescribed today, as well as treatment plan/ rationale and result expectations havebeen discussed with the patient who expresses understanding and desires to proceed. Pt instructions reviewed and given to patient.     Close follow up to evaluate treatment resultsand for coordination of care. I have reviewed the patient's medical historyin detail and updated the computerized patient record.     Konstantin Cabezas, APRN - CNP

## 2021-07-21 ASSESSMENT — ENCOUNTER SYMPTOMS
VOMITING: 0
NAUSEA: 0
ABDOMINAL PAIN: 0
SHORTNESS OF BREATH: 0
DIARRHEA: 0
BACK PAIN: 0
SINUS PAIN: 0
SINUS PRESSURE: 0
COUGH: 0
CHEST TIGHTNESS: 0
SORE THROAT: 0

## 2021-07-22 ENCOUNTER — INITIAL CONSULT (OUTPATIENT)
Dept: INTERVENTIONAL RADIOLOGY/VASCULAR | Age: 66
End: 2021-07-22
Payer: COMMERCIAL

## 2021-07-22 VITALS
DIASTOLIC BLOOD PRESSURE: 80 MMHG | RESPIRATION RATE: 16 BRPM | SYSTOLIC BLOOD PRESSURE: 122 MMHG | HEIGHT: 60 IN | BODY MASS INDEX: 32 KG/M2 | WEIGHT: 163 LBS | HEART RATE: 91 BPM

## 2021-07-22 DIAGNOSIS — M79.669 PAIN AND SWELLING OF LOWER LEG, UNSPECIFIED LATERALITY: ICD-10-CM

## 2021-07-22 DIAGNOSIS — I83.813 PAIN DUE TO VARICOSE VEINS OF BOTH LOWER EXTREMITIES: Primary | ICD-10-CM

## 2021-07-22 DIAGNOSIS — M79.89 PAIN AND SWELLING OF LOWER LEG, UNSPECIFIED LATERALITY: ICD-10-CM

## 2021-07-22 PROCEDURE — 99244 OFF/OP CNSLTJ NEW/EST MOD 40: CPT | Performed by: NURSE PRACTITIONER

## 2021-07-22 RX ORDER — CELECOXIB 100 MG/1
CAPSULE ORAL
COMMUNITY
Start: 2021-07-20 | End: 2021-09-20

## 2021-07-22 ASSESSMENT — ENCOUNTER SYMPTOMS
SORE THROAT: 0
VOMITING: 0
TROUBLE SWALLOWING: 0
EYE DISCHARGE: 0
COUGH: 0
SHORTNESS OF BREATH: 0
WHEEZING: 0
NAUSEA: 0
DIARRHEA: 0
EYE ITCHING: 0
CONSTIPATION: 0
ABDOMINAL DISTENTION: 0
EYE PAIN: 0
ABDOMINAL PAIN: 0
GASTROINTESTINAL NEGATIVE: 1
RESPIRATORY NEGATIVE: 1
EYES NEGATIVE: 1

## 2021-07-22 NOTE — PROGRESS NOTES
Vasquez Kuo, a female of 60 y.o. came to the office 2021. Chief Complaint   Patient presents with   [facility] Establish Care     Referral from Don for symptomatic rt leg varicose veins       2021 HPI: Vasquez Kuo referred by PCP for evaluation of painful LE varicose veins. Patient presents with symptoms of: right leg pain and swelling lower leg onset 9 days ago. Reports pain was so bad a few days ago she almost could not ambulate. Was prescribed some medications by ED and walk in clinic and since then pain has become tolerable. She continues to have RLE pain and edema but is now tolerable. Left leg with some mild proximal posterior calf pain onset yesterday but very mild she reports. Several large ropey prominent varicose veins to right leg causing the pain and swelling. Has had chronic pain in varicose veins in past several years but not this severe. Was prescribed Mobic, steroid, and flexeril and taking with relief now. RLE duplex negative for DVT done 21. Denies shortness of breath or chest pain. NSAIDS: PRN Mobic meds with relief. Leg elevation: Daily without relief. Stocking use and dates: Has not done conservative therapy with class two compression stockings. Denies claudication. Denies short of breath. Denies acute calf pain.      Family History   Problem Relation Age of Onset    Heart Disease Mother     Diabetes Mother     Heart Attack Father     Diabetes Sister     Diabetes Brother     Diabetes Sister     Heart Disease Sister        Past Surgical History:   Procedure Laterality Date     SECTION      CHOLECYSTECTOMY      KIDNEY STONE SURGERY          Past Medical History:   Diagnosis Date    Atrial fibrillation ([address]     Congenital heart disease     Diabetes mellitus ([address]     Hyperlipidemia     Hypertension        Social History     Socioeconomic History    Marital status:      Spouse name: None    Number of children: None    Years of education: None    Highest education level: None   Occupational History    None   Tobacco Use    Smoking status: Never Smoker    Smokeless tobacco: Never Used   Substance and Sexual Activity    Alcohol use: Never    Drug use: No    Sexual activity: None   Other Topics Concern    None   Social History Narrative    None     Social Determinants of Health     Financial Resource Strain: Low Risk     Difficulty of Paying Living Expenses: Not hard at all   Food Insecurity: No Food Insecurity    Worried About Running Out of Food in the Last Year: Never true    Dasha of Food in the Last Year: Never true   Transportation Needs: No Transportation Needs    Lack of Transportation (Medical): No    Lack of Transportation (Non-Medical):  No   Physical Activity:     Days of Exercise per Week:     Minutes of Exercise per Session:    Stress:     Feeling of Stress :    Social Connections:     Frequency of Communication with Friends and Family:     Frequency of Social Gatherings with Friends and Family:     Attends Jainism Services:     Active Member of Clubs or Organizations:     Attends Club or Organization Meetings:     Marital Status:    Intimate Partner Violence:     Fear of Current or Ex-Partner:     Emotionally Abused:     Physically Abused:     Sexually Abused:        No Known Allergies    Current Outpatient Medications on File Prior to Visit   Medication Sig Dispense Refill    celecoxib (CELEBREX) 100 MG capsule       cyclobenzaprine (FLEXERIL) 10 MG tablet Take by mouth      Compression Stockings MISC by Does not apply route Bilateral Knee high   20-30mmHg  On in AM off at HS 1 each 0    meloxicam (MOBIC) 15 MG tablet Take 15 mg by mouth as needed for Pain      predniSONE (DELTASONE) 10 MG tablet Take 1 tablet by mouth daily for 10 days 10 tablet 0    albuterol sulfate HFA (PROVENTIL HFA) 108 (90 Base) MCG/ACT inhaler Inhale 2 puffs into the lungs every 6 hours as needed for Wheezing or Shortness of Breath 1 Inhaler 3    fluticasone (FLONASE) 50 MCG/ACT nasal spray 1 spray by Each Nostril route daily 2 Bottle 1    glipiZIDE (GLUCOTROL XL) 5 MG extended release tablet TAKE ONE TABLET BY MOUTH TWO TIMES A DAY 60 tablet 2    FreeStyle Lancets MISC USE TO TEST ONCE TO TWICE DAILY AND AS NEEDED FOR SYMPTOMS OF IRREGULAR BLOOD GLUCOSE      dilTIAZem (TIAZAC) 240 MG extended release capsule TAKE ONE CAPSULE BY MOUTH EVERY DAY 90 capsule 0    rosuvastatin (CRESTOR) 20 MG tablet Take 1 tablet by mouth daily 90 tablet 3    vitamin D (ERGOCALCIFEROL) 1.25 MG (21429 UT) CAPS capsule TAKE ONE CAPSULE BY MOUTH ONCE A WEEK 12 capsule 3    Blood Glucose Monitoring Suppl ([employer] BLOOD GLUCOSE METER) w/Device KIT 1 Device by Does not apply route 2 times daily 1 kit 0    blood glucose monitor strips Test 1 times a day & as needed for symptoms of irregular blood glucose. Dispense sufficient amount for indicated testing frequency plus additional to accommodate PRN testing needs. 100 strip 3    ondansetron (ZOFRAN) 4 MG tablet Take 1 tablet by mouth 3 times daily as needed for Nausea or Vomiting 15 tablet 0    metoprolol tartrate (LOPRESSOR) 25 MG tablet Take 25 mg by mouth daily      blood glucose monitor kit and supplies Test 2 times a day & as needed for symptoms of irregular blood glucose. Lancets and test strips to be included 1 kit 0    aspirin 81 MG tablet Take 81 mg by mouth daily.  nitroGLYCERIN (NITROSTAT) 0.4 MG SL tablet Place 0.4 mg under the tongue every 5 minutes as needed.  acetaminophen (TYLENOL ARTHRITIS PAIN) 650 MG CR tablet Take 650 mg by mouth every 8 hours as needed. No current facility-administered medications on file prior to visit. Review of Systems   Constitutional: Negative. Negative for activity change, appetite change, chills, fatigue and fever. HENT: Negative. Negative for sore throat and trouble swallowing. Eyes: Negative. Negative for pain, discharge and itching. Respiratory: Negative. Negative for cough, shortness of breath and wheezing. Cardiovascular: Positive for leg swelling (right lower leg). Negative for chest pain and palpitations. Painful bilateral leg varicose veins. Gastrointestinal: Negative. Negative for abdominal distention, abdominal pain, constipation, diarrhea, nausea and vomiting. Endocrine: Negative. Genitourinary: Negative. Musculoskeletal: Negative. Skin: Negative. Neurological: Negative. Negative for dizziness, light-headedness and headaches. Hematological: Negative. Psychiatric/Behavioral: Negative. OBJECTIVE:  /80   Pulse 91   Resp 16   Ht 5' (1.524 m)   Wt 163 lb (73.9 kg)   BMI 31.83 kg/m²     Physical Exam  Constitutional:       General: She is not in acute distress. Appearance: Normal appearance. She is not ill-appearing. HENT:      Head: Normocephalic and atraumatic. Nose: Nose normal.   Cardiovascular:      Rate and Rhythm: Normal rate. Pulses: Normal pulses. Dorsalis pedis pulses are 2+ on the right side and 2+ on the left side. Posterior tibial pulses are 2+ on the right side and 2+ on the left side. Pulmonary:      Breath sounds: Normal breath sounds. Abdominal:      General: Bowel sounds are normal.      Palpations: Abdomen is soft. Musculoskeletal:         General: Tenderness (bialteral leg varicose veins with palpation) present. Cervical back: Neck supple. Right lower leg: No edema. Left lower leg: No edema. Skin:     General: Skin is warm and dry. Capillary Refill: Capillary refill takes 2 to 3 seconds. Neurological:      Mental Status: She is alert and oriented to person, place, and time. Psychiatric:         Mood and Affect: Mood normal.         Behavior: Behavior normal.                           ASSESSMENT AND PLAN:  Chart, medications, lab work reviewed. Assessment:   1. Bilateral lower leg swelling and edema:   Possible component of chronic venous insufficiency. Has not done conservative therapy with class two compression stockings. 2.   Painful bilateral leg varicose veins      Plan:   1. Return for bilateral LE Venous US evaluate for venous insufficiency with office return for results. Educated in detail disease process of venous insufficiency, purpose of ultrasound, and purpose of compression stockings. Will evaluate need for class II compression to legs once US scan and results done. 2. Elevate LE's when sitting and/or lying for management of LE edema. 3. Patient is advised that if symptoms worsen in any way they will proceed to the nearest emergency room. Thank Silvana Mcintyre, CNP, for referral of your patient to our practice for care.      Natalie Hernandez, APRN - CNP

## 2021-07-23 ENCOUNTER — TELEPHONE (OUTPATIENT)
Dept: FAMILY MEDICINE CLINIC | Age: 66
End: 2021-07-23

## 2021-07-23 NOTE — TELEPHONE ENCOUNTER
Called and spoke to [facility] letting her know that Marialuisa Braxton signed her paperwork and it has been faxed. She is going to come next week to  a copy. In the drawer up front.

## 2021-07-30 ENCOUNTER — TELEPHONE (OUTPATIENT)
Dept: FAMILY MEDICINE CLINIC | Age: 66
End: 2021-07-30

## 2021-07-30 NOTE — TELEPHONE ENCOUNTER
[address] return to work authorizations to be filled out and faxed, different fax numbers listed on each form. Pt can be reached at [phone]. Forms scanned and attached to encounter, hard copy in mailbox.

## 2021-08-03 ENCOUNTER — TELEPHONE (OUTPATIENT)
Dept: FAMILY MEDICINE CLINIC | Age: 66
End: 2021-08-03

## 2021-08-03 NOTE — TELEPHONE ENCOUNTER
----- Message from Ashley Sanders sent at 8/3/2021  2:13 PM EDT -----  Subject: Message to Provider    QUESTIONS  Information for Provider? Patient is requesting an extended leave of   absence for your employer until August 15, 2021. Patient states leg is   still causing pain and bothering her. Please call patient for update. Employer needs another RETURN TO WORK SLIP that extends until August 15th.  ---------------------------------------------------------------------------  --------------  Elena DARLING  What is the best way for the office to contact you? OK to leave message on   voicemail  Preferred Call Back Phone Number? [phone]  ---------------------------------------------------------------------------  --------------  SCRIPT ANSWERS  Relationship to Patient?  Self

## 2021-08-11 ENCOUNTER — OFFICE VISIT (OUTPATIENT)
Dept: INTERVENTIONAL RADIOLOGY/VASCULAR | Age: 66
End: 2021-08-11
Payer: COMMERCIAL

## 2021-08-11 VITALS
BODY MASS INDEX: 31.83 KG/M2 | HEART RATE: 91 BPM | WEIGHT: 163 LBS | SYSTOLIC BLOOD PRESSURE: 122 MMHG | DIASTOLIC BLOOD PRESSURE: 80 MMHG

## 2021-08-11 DIAGNOSIS — I87.2 EDEMA OF BOTH LOWER EXTREMITIES DUE TO PERIPHERAL VENOUS INSUFFICIENCY: Primary | ICD-10-CM

## 2021-08-11 DIAGNOSIS — R60.0 EDEMA OF RIGHT LOWER EXTREMITY: ICD-10-CM

## 2021-08-11 DIAGNOSIS — I83.813 PAIN DUE TO VARICOSE VEINS OF BOTH LOWER EXTREMITIES: ICD-10-CM

## 2021-08-11 DIAGNOSIS — M79.669 PAIN AND SWELLING OF LOWER LEG, UNSPECIFIED LATERALITY: ICD-10-CM

## 2021-08-11 DIAGNOSIS — M79.89 PAIN AND SWELLING OF LOWER LEG, UNSPECIFIED LATERALITY: ICD-10-CM

## 2021-08-11 PROCEDURE — 99214 OFFICE O/P EST MOD 30 MIN: CPT | Performed by: NURSE PRACTITIONER

## 2021-08-11 ASSESSMENT — ENCOUNTER SYMPTOMS
EYE DISCHARGE: 0
SHORTNESS OF BREATH: 0
NAUSEA: 0
EYE ITCHING: 0
DIARRHEA: 0
SORE THROAT: 0
TROUBLE SWALLOWING: 0
RESPIRATORY NEGATIVE: 1
WHEEZING: 0
EYES NEGATIVE: 1
VOMITING: 0
EYE PAIN: 0
ABDOMINAL DISTENTION: 0
CONSTIPATION: 0
ABDOMINAL PAIN: 0
GASTROINTESTINAL NEGATIVE: 1
COUGH: 0

## 2021-08-11 NOTE — PROGRESS NOTES
Benito Duffy, a female of 60 y.o. came to the office 8/11/2021. Chief Complaint   Patient presents with    Results     us results      PROGRESS NOT    SUBJECTIVE:     8/11/2021: Benito Duffy returns for results of LE VEnous US to evaluate for Venous Insufficiency. US REport CVI to both GSV. Reports has since seen orthopedics and is scheduled for MRI due to chronic severe right knee pain. Reports bilateral LE symptoms including bilateral leg chronic aching and edema and painful varicose veins persist and remain unchanged, RLE > LLE. She would like to discuss further treatment for CVI. Stocking use and dates: Has not done conservative therapy with class two compression stockings. Denies claudication. Denies short of breath. Denies acute calf pain. 7/22/2021 HPI: Benito Duffy referred by PCP for evaluation of painful LE varicose veins. Patient presents with symptoms of: right leg pain and swelling lower leg onset 9 days ago. Reports pain was so bad a few days ago she almost could not ambulate. Was prescribed some medications by ED and walk in clinic and since then pain has become tolerable. She continues to have RLE pain and edema but is now tolerable. Left leg with some mild proximal posterior calf pain onset yesterday but very mild she reports. Several large ropey prominent varicose veins to right leg causing the pain and swelling. Has had chronic pain in varicose veins in past several years but not this severe. Was prescribed Mobic, steroid, and flexeril and taking with relief now. RLE duplex negative for DVT done 7/12/21. Denies shortness of breath or chest pain. NSAIDS: PRN Mobic meds with relief. Leg elevation: Daily without relief. Stocking use and dates: Has not done conservative therapy with class two compression stockings. Denies claudication. Denies short of breath. Denies acute calf pain.      Family History   Problem Relation Age of Onset    Heart Disease Mother  Diabetes Mother     Heart Attack Father     Diabetes Sister     Diabetes Brother     Diabetes Sister     Heart Disease Sister        Past Surgical History:   Procedure Laterality Date     SECTION      CHOLECYSTECTOMY      KIDNEY STONE SURGERY          Past Medical History:   Diagnosis Date    Atrial [address]     Congenital heart disease     Diabetes mellitus ([facility]     Hyperlipidemia     Hypertension        Social History     Socioeconomic History    Marital status:      Spouse name: Not on file    Number of children: Not on file    Years of education: Not on file    Highest education level: Not on file   Occupational History    Not on file   Tobacco Use    Smoking status: Never Smoker    Smokeless tobacco: Never Used   Substance and Sexual Activity    Alcohol use: Never    Drug use: No    Sexual activity: Not on file   Other Topics Concern    Not on file   Social History Narrative    Not on file     Social Determinants of Health     Financial Resource Strain: Low Risk     Difficulty of Paying Living Expenses: Not hard at all   Food Insecurity: No Food Insecurity    Worried About [facility] in the Last Year: Never true    [facility] in the Last Year: Never true   Transportation Needs: No Transportation Needs    Lack of Transportation (Medical): No    Lack of Transportation (Non-Medical):  No   Physical Activity:     Days of Exercise per Week:     Minutes of Exercise per Session:    Stress:     Feeling of Stress :    Social Connections:     Frequency of Communication with Friends and Family:     Frequency of Social Gatherings with Friends and Family:     Attends Worship Services:     Active Member of Clubs or Organizations:     Attends Club or Organization Meetings:     Marital Status:    Intimate Partner Violence:     Fear of Current or Ex-Partner:     Emotionally Abused:     Physically Abused:     Sexually Abused: No Known Allergies    Current Outpatient Medications on File Prior to Visit   Medication Sig Dispense Refill    celecoxib (CELEBREX) 100 MG capsule       Compression Stockings MISC by Does not apply route Bilateral Knee high   20-30mmHg  On in AM off at HS 1 each 0    meloxicam (MOBIC) 15 MG tablet Take 15 mg by mouth as needed for Pain      albuterol sulfate HFA (PROVENTIL HFA) 108 (90 Base) MCG/ACT inhaler Inhale 2 puffs into the lungs every 6 hours as needed for Wheezing or Shortness of Breath 1 Inhaler 3    fluticasone (FLONASE) 50 MCG/ACT nasal spray 1 spray by Each Nostril route daily 2 Bottle 1    glipiZIDE (GLUCOTROL XL) 5 MG extended release tablet TAKE ONE TABLET BY MOUTH TWO TIMES A DAY 60 tablet 2    FreeStyle Lancets MISC USE TO TEST ONCE TO TWICE DAILY AND AS NEEDED FOR SYMPTOMS OF IRREGULAR BLOOD GLUCOSE      dilTIAZem (TIAZAC) 240 MG extended release capsule TAKE ONE CAPSULE BY MOUTH EVERY DAY 90 capsule 0    rosuvastatin (CRESTOR) 20 MG tablet Take 1 tablet by mouth daily 90 tablet 3    vitamin D (ERGOCALCIFEROL) 1.25 MG (38355 UT) CAPS capsule TAKE ONE CAPSULE BY MOUTH ONCE A WEEK 12 capsule 3    Blood Glucose Monitoring Suppl ([employer] BLOOD GLUCOSE METER) w/Device KIT 1 Device by Does not apply route 2 times daily 1 kit 0    blood glucose monitor strips Test 1 times a day & as needed for symptoms of irregular blood glucose. Dispense sufficient amount for indicated testing frequency plus additional to accommodate PRN testing needs. 100 strip 3    ondansetron (ZOFRAN) 4 MG tablet Take 1 tablet by mouth 3 times daily as needed for Nausea or Vomiting 15 tablet 0    metoprolol tartrate (LOPRESSOR) 25 MG tablet Take 25 mg by mouth daily      blood glucose monitor kit and supplies Test 2 times a day & as needed for symptoms of irregular blood glucose. Lancets and test strips to be included 1 kit 0    aspirin 81 MG tablet Take 81 mg by mouth daily.         nitroGLYCERIN (NITROSTAT) 0.4 MG SL tablet Place 0.4 mg under the tongue every 5 minutes as needed.  acetaminophen (TYLENOL ARTHRITIS PAIN) 650 MG CR tablet Take 650 mg by mouth every 8 hours as needed. No current facility-administered medications on file prior to visit. Review of Systems   Constitutional: Negative. Negative for activity change, appetite change, chills, fatigue and fever. HENT: Negative. Negative for sore throat and trouble swallowing. Eyes: Negative. Negative for pain, discharge and itching. Respiratory: Negative. Negative for cough, shortness of breath and wheezing. Cardiovascular: Positive for leg swelling (right lower leg). Negative for chest pain and palpitations. Painful bilateral leg varicose veins. Gastrointestinal: Negative. Negative for abdominal distention, abdominal pain, constipation, diarrhea, nausea and vomiting. Endocrine: Negative. Genitourinary: Negative. Musculoskeletal:        Chronic bilateral lower leg aching. Skin: Negative. Neurological: Negative. Negative for dizziness, light-headedness and headaches. Hematological: Negative. Psychiatric/Behavioral: Negative. OBJECTIVE:  /80   Pulse 91   Wt 163 lb (73.9 kg)   BMI 31.83 kg/m²     Physical Exam  Constitutional:       General: She is not in acute distress. Appearance: Normal appearance. She is not ill-appearing. HENT:      Head: Normocephalic and atraumatic. Nose: Nose normal.   Cardiovascular:      Rate and Rhythm: Normal rate. Pulses: Normal pulses. Dorsalis pedis pulses are 2+ on the right side and 2+ on the left side. Posterior tibial pulses are 2+ on the right side and 2+ on the left side. Pulmonary:      Breath sounds: Normal breath sounds. Abdominal:      General: Bowel sounds are normal.      Palpations: Abdomen is soft. Musculoskeletal:         General: Tenderness (bialteral leg varicose veins with palpation) present. Cervical back: Neck supple. Right lower leg: No edema. Left lower leg: No edema. Skin:     General: Skin is warm and dry. Capillary Refill: Capillary refill takes 2 to 3 seconds. Neurological:      Mental Status: She is alert and oriented to person, place, and time. Psychiatric:         Mood and Affect: Mood normal.         Behavior: Behavior normal.                       7/28/2021:   Impression   1. No signs of deep venous thrombosis in the bilateral lower extremities. 2.There is venous insufficiency of the right great saphenous vein at the level of the mid thigh, proximal calf, mid calf and distal calf. Duration of reflux is 3.3 seconds, diameter is 8 mm. 3.There is venous insufficiency of the left great saphenous vein at the level of the proximal and mid calf. Duration is 3.1 seconds, diameter is 6 mm. 4.Varicose veins are noted in the right calf.           COMPARISON:No prior studies are available for comparison.  .     LE Venous US report reviewed and discussed with patient. ASSESSMENT AND PLAN:  Chart, medications, lab work reviewed. Assessment:   1. Chronic Venous Insufficiency Right GSV and Left GSV causing Bilateral lower leg swelling and edema:  Has not done conservative therapy with class two compression stockings. 2.   Painful bilateral leg varicose veins      Plan:   Rx thigh high compression 20-30 mmhg faxed to [facility] to wear daily during day and off Qhs. Wear as tolerated. Do not wear if they cause increased discomfort or lesions and notify vascular clinic. Educated in detail disease process of venous insufficiency and purpose of compression stockings. Wear for 10 week trial and return to evaluate effectiveness and management of LE symptoms. If no relief, will discuss possible need for venous procedures. Patient is responsible for cost of compression socks if not covered by insurance. Elevate LE's when sitting and/or lying for management of LE edema.    Patient is advised that if symptoms worsen in any way they will proceed to the nearest emergency room. 5.   Follow up with general practitioner for other medical concerns and management.      Racquel Leahy, EJ - CNP

## 2021-08-18 ENCOUNTER — TELEPHONE (OUTPATIENT)
Dept: FAMILY MEDICINE CLINIC | Age: 66
End: 2021-08-18

## 2021-08-18 ENCOUNTER — NURSE TRIAGE (OUTPATIENT)
Dept: OTHER | Facility: CLINIC | Age: 66
End: 2021-08-18

## 2021-08-18 DIAGNOSIS — Z76.89 RETURN TO WORK EVALUATION: Primary | ICD-10-CM

## 2021-08-18 NOTE — TELEPHONE ENCOUNTER
Ok to provide letter and order put in to occupational health for a functional capacity exam to determine patient's limits

## 2021-08-18 NOTE — TELEPHONE ENCOUNTER
Reason for Disposition   Nursing judgment    Answer Assessment - Initial Assessment Questions  1. REASON FOR CALL or QUESTION: \"What is your reason for calling today? \" or \"How can I best help you? \" or \"What question do you have that I can help answer? 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 Her employee stated that there is no light duty available, so patient needs a work note. Her leg swelling has NOT increased, it is the same as it has been. Protocols used: INFORMATION ONLY CALL - NO TRIAGE-ADULT-[place]    Received call from [facility] at [facility] with Red Flag Complaint. Triage not done. 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 Her employee stated that there is no light duty available, so patient needs a work note. Her leg swelling has NOT increased, it is the same as it has been. This RN spoke with [place] at [address] [place] [address] who will get a message to Catherine Hughes CNP. This note also sent routed to [facility]. Attention Provider: Thank you for allowing me to participate in the care of your patient. The patient was connected to triage in response to information provided to the [facility]. Please do not respond through this encounter as the response is not directed to a shared pool.

## 2021-08-18 NOTE — TELEPHONE ENCOUNTER
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      Please advise [phone]

## 2021-08-19 ENCOUNTER — TELEPHONE (OUTPATIENT)
Dept: FAMILY MEDICINE CLINIC | Age: 66
End: 2021-08-19

## 2021-08-19 NOTE — TELEPHONE ENCOUNTER
----- Message from Carolyn Jean sent at 8/19/2021  8:20 AM EDT -----  Subject: Message to Provider    QUESTIONS  Information for Provider? Patient called to state that she cannot do light   duty. They do not have light duty. She is doing heavy duty job pushing   50lbs carts. Her right leg is swollen and very painful. She wants a FMLA   paper taking her off work for 3 weeks. She is going to get an mri on her   leg 8/21/2021 and the leg doctor Dr. Kael Rose on the 8/28/2021 he feels like   her knee might be broken and her circulation is not going all the way   down. Please call pt to advise asap please call before 12, she has to call   off for today.  ---------------------------------------------------------------------------  --------------  [address]  What is the best way for the office to contact you? OK to leave message on   voicemail  Preferred Call Back Phone Number? [phone]  ---------------------------------------------------------------------------  --------------  SCRIPT ANSWERS  Relationship to Patient?  Self

## 2021-08-23 NOTE — TELEPHONE ENCOUNTER
Tried to call twice - No ringing and no answer. Does not go to voicemail. Will attempt another call later.

## 2021-09-01 ENCOUNTER — TELEPHONE (OUTPATIENT)
Dept: FAMILY MEDICINE CLINIC | Age: 66
End: 2021-09-01

## 2021-09-01 NOTE — TELEPHONE ENCOUNTER
----- Message from Reyes Woo sent at 9/1/2021  9:27 AM EDT -----  Subject: Message to Provider    QUESTIONS  Information for Provider? Patient has a orthopedic specialist who is   caring for her leg at this time. She would like Rose Mercer to know this.   ---------------------------------------------------------------------------  --------------  CALL BACK INFO  What is the best way for the office to contact you? OK to leave message on   voicemail  Preferred Call Back Phone Number? [phone]  ---------------------------------------------------------------------------  --------------  SCRIPT ANSWERS  Relationship to Patient?  Self

## 2021-09-20 RX ORDER — CELECOXIB 100 MG/1
CAPSULE ORAL
Qty: 180 CAPSULE | Refills: 0 | Status: SHIPPED | OUTPATIENT
Start: 2021-09-20 | End: 2021-12-30

## 2021-10-18 DIAGNOSIS — E11.8 TYPE 2 DIABETES MELLITUS WITH COMPLICATION, WITHOUT LONG-TERM CURRENT USE OF INSULIN (HCC): ICD-10-CM

## 2021-10-18 RX ORDER — GLIPIZIDE 5 MG/1
TABLET, FILM COATED, EXTENDED RELEASE ORAL
Qty: 30 TABLET | Refills: 0 | Status: SHIPPED | OUTPATIENT
Start: 2021-10-18 | End: 2021-11-19

## 2021-11-17 DIAGNOSIS — E11.8 TYPE 2 DIABETES MELLITUS WITH COMPLICATION, WITHOUT LONG-TERM CURRENT USE OF INSULIN (HCC): ICD-10-CM

## 2021-11-18 ENCOUNTER — NURSE TRIAGE (OUTPATIENT)
Dept: OTHER | Facility: CLINIC | Age: 66
End: 2021-11-18

## 2021-11-18 NOTE — TELEPHONE ENCOUNTER
Received call from [facility] with Red Flag Complaint. Brief description of triage: pt requests medication refill, c/o dizziness, states she does not wish to to be seen. Triage indicates for patient to See PCP in 24 hours, pt refused disposition, states that she \"just wants mediatation refill because I travel In 2 days\"     Care advice provided, patient verbalizes understanding; denies any other questions or concerns; instructed to call back for any new or worsening symptoms. Writer provided warm transfer to [facility] for appointment scheduling. Attention Provider: Thank you for allowing me to participate in the care of your patient. The patient was connected to triage in response to information provided to the [facility]/PSC. Please do not respond through this encounter as the response is not directed to a shared pool. Reason for Disposition   [1] MODERATE dizziness (e.g., interferes with normal activities) AND [2] has NOT been evaluated by physician for this  (Exception: dizziness caused by heat exposure, sudden standing, or poor fluid intake)    Answer Assessment - Initial Assessment Questions  1. DESCRIPTION: \"Describe your dizziness. \"      Lightheadedness    2. LIGHTHEADED: \"Do you feel lightheaded? \" (e.g., somewhat faint, woozy, weak upon standing)      Yes    3. VERTIGO: \"Do you feel like either you or the room is spinning or tilting? \" (i.e. vertigo)      Denies     4. SEVERITY: \"How bad is it? \"  \"Do you feel like you are going to faint? \" \"Can you stand and walk? \"    - MILD - walking normally    - MODERATE - interferes with normal activities (e.g., work, school)     - SEVERE - unable to stand, requires support to walk, feels like passing out now. Able to walk     5. ONSET:  \"When did the dizziness begin? \"      Yesterday     6. AGGRAVATING FACTORS: \"Does anything make it worse? \" (e.g., standing, change in head position)      Denies     7.  HEART RATE: [employer] you tell me your heart rate? \" \"How many beats in 15 seconds? \"  (Note: not all patients can do this)        Unable to assess    8. CAUSE: \"What do you think is causing the dizziness? \"      Needs mediction refill Glipizide and diltiazem     9. RECURRENT SYMPTOM: \"Have you had dizziness before? \" If so, ask: \"When was the last time? \" \"What happened that time? \"      Not lately    10. OTHER SYMPTOMS: \"Do you have any other symptoms? \" (e.g., fever, chest pain, vomiting, diarrhea, bleeding)        Denies    11. PREGNANCY: \"Is there any chance you are pregnant? \" \"When was your last menstrual period? \"        n/a    Protocols used: DIZZINESS - LIGHTHEADEDNESS-ADULT-AH

## 2021-11-18 NOTE — TELEPHONE ENCOUNTER
Future Appointments    This patient does not currently have any appointments scheduled.     Recent Visits    07/20/2021 Symptomatic varicose veins, right   [facility] and [address] [place] [address], [facility]

## 2021-11-19 RX ORDER — DILTIAZEM HYDROCHLORIDE 240 MG/1
CAPSULE, EXTENDED RELEASE ORAL
Qty: 90 CAPSULE | Refills: 0 | Status: SHIPPED | OUTPATIENT
Start: 2021-11-19 | End: 2021-11-19 | Stop reason: SDUPTHER

## 2021-11-19 RX ORDER — DILTIAZEM HYDROCHLORIDE 240 MG/1
CAPSULE, EXTENDED RELEASE ORAL
Qty: 90 CAPSULE | Refills: 0 | Status: SHIPPED | OUTPATIENT
Start: 2021-11-19 | End: 2022-05-11

## 2021-11-19 RX ORDER — GLIPIZIDE 5 MG/1
TABLET, FILM COATED, EXTENDED RELEASE ORAL
Qty: 30 TABLET | Refills: 0 | Status: SHIPPED | OUTPATIENT
Start: 2021-11-19 | End: 2021-12-01 | Stop reason: SDUPTHER

## 2021-11-19 NOTE — TELEPHONE ENCOUNTER
Future Appointments    This patient does not currently have any appointments scheduled.     Recent Visits    07/20/2021 Symptomatic varicose veins, right   [facility] and [address] [place] [address], [facility]

## 2021-12-01 DIAGNOSIS — E11.8 TYPE 2 DIABETES MELLITUS WITH COMPLICATION, WITHOUT LONG-TERM CURRENT USE OF INSULIN (HCC): ICD-10-CM

## 2021-12-01 RX ORDER — GLIPIZIDE 5 MG/1
5 TABLET, FILM COATED, EXTENDED RELEASE ORAL DAILY
Qty: 90 TABLET | Refills: 3 | Status: SHIPPED | OUTPATIENT
Start: 2021-12-01 | End: 2022-03-18 | Stop reason: SDUPTHER

## 2021-12-01 NOTE — TELEPHONE ENCOUNTER
----- Message from Akin Teran sent at 12/1/2021 10:09 AM EST -----  Subject: Refill Request    QUESTIONS  Name of Medication? glipiZIDE (GLUCOTROL XL) 5 MG extended release tablet  Patient-reported dosage and instructions? 5mg  How many days do you have left? 0  Preferred Pharmacy? [id number]  Pharmacy phone number (if available)? [phone]  Additional Information for Provider? Patient is asking for a refill on   medication until she is able to be seen. Patient said her sugar has been   high lately. Patient wants to make sure when she see's Dr. Herrera Mclain that   she is able to get her medication refilled on time because last time she   saw a different pcp it took 3 days to get her medication refilled. Patient   said she thinks that's' why her blood sugar is high now.  ---------------------------------------------------------------------------  --------------  CALL BACK INFO  What is the best way for the office to contact you? OK to leave message on   voicemail  Preferred Call Back Phone Number?  [phone]

## 2021-12-14 ENCOUNTER — OFFICE VISIT (OUTPATIENT)
Dept: FAMILY MEDICINE CLINIC | Age: 66
End: 2021-12-14
Payer: COMMERCIAL

## 2021-12-14 VITALS
HEIGHT: 60 IN | BODY MASS INDEX: 32.39 KG/M2 | DIASTOLIC BLOOD PRESSURE: 80 MMHG | HEART RATE: 77 BPM | SYSTOLIC BLOOD PRESSURE: 126 MMHG | OXYGEN SATURATION: 97 % | TEMPERATURE: 97.3 F | WEIGHT: 165 LBS

## 2021-12-14 DIAGNOSIS — E11.8 TYPE 2 DIABETES MELLITUS WITH COMPLICATION, WITHOUT LONG-TERM CURRENT USE OF INSULIN (HCC): Primary | ICD-10-CM

## 2021-12-14 LAB — HBA1C MFR BLD: 10.3 %

## 2021-12-14 PROCEDURE — 83036 HEMOGLOBIN GLYCOSYLATED A1C: CPT | Performed by: PHYSICIAN ASSISTANT

## 2021-12-14 PROCEDURE — 99213 OFFICE O/P EST LOW 20 MIN: CPT | Performed by: PHYSICIAN ASSISTANT

## 2021-12-14 RX ORDER — GLIPIZIDE 5 MG/1
TABLET, FILM COATED, EXTENDED RELEASE ORAL
Qty: 90 TABLET | Refills: 2 | Status: SHIPPED | OUTPATIENT
Start: 2021-12-14 | End: 2022-03-18

## 2021-12-14 SDOH — ECONOMIC STABILITY: FOOD INSECURITY: WITHIN THE PAST 12 MONTHS, YOU WORRIED THAT YOUR FOOD WOULD RUN OUT BEFORE YOU GOT MONEY TO BUY MORE.: NEVER TRUE

## 2021-12-14 SDOH — ECONOMIC STABILITY: FOOD INSECURITY: WITHIN THE PAST 12 MONTHS, THE FOOD YOU BOUGHT JUST DIDN'T LAST AND YOU DIDN'T HAVE MONEY TO GET MORE.: NEVER TRUE

## 2021-12-14 ASSESSMENT — SOCIAL DETERMINANTS OF HEALTH (SDOH): HOW HARD IS IT FOR YOU TO PAY FOR THE VERY BASICS LIKE FOOD, HOUSING, MEDICAL CARE, AND HEATING?: NOT HARD AT ALL

## 2021-12-14 ASSESSMENT — ENCOUNTER SYMPTOMS
CONSTIPATION: 0
SINUS PRESSURE: 0
EYE PAIN: 0
SHORTNESS OF BREATH: 0
COUGH: 0
DIARRHEA: 0
BACK PAIN: 1
NAUSEA: 0
ABDOMINAL PAIN: 0
ALLERGIC/IMMUNOLOGIC NEGATIVE: 1
SINUS PAIN: 0
SORE THROAT: 0
EYE DISCHARGE: 0
CHEST TIGHTNESS: 0
VOMITING: 0
WHEEZING: 0

## 2021-12-14 NOTE — PROGRESS NOTES
Subjective  Cuba Madden, 73 y.o. female presents today with:  Chief Complaint   Patient presents with    Diabetes     She states her sugars have been elevated. She states they are running 430, 320 and 200's for 2 weeks. Treatment Adherence:   Medication compliance:  compliant all of the time  Diet compliance:  compliant most of the time  Weight trend: increasing  Current exercise: no regular exercise      Diabetes Mellitus Type 2: Current symptoms/problems include none. Home blood sugar records:  did not test today  Any episodes of hypoglycemia? no  Eye exam current (within one year): yes  Tobacco history: She  reports that she has never smoked. She has never used smokeless tobacco.   Daily Aspirin? Yes  Known diabetic complications: none        Lab Results   Component Value Date    LABA1C 10.3 12/14/2021    LABA1C 7.9 11/11/2020    LABA1C 8.9 10/14/2020     Lab Results   Component Value Date    LABMICR <1.20 10/14/2020    CREATININE 0.58 10/01/2020     Lab Results   Component Value Date    ALT 16 10/01/2020    AST 20 10/01/2020     Lab Results   Component Value Date    CHOL 199 08/31/2018    TRIG 81 08/31/2018    HDL 72 (H) 08/31/2018    LDLCALC 111 08/31/2018          HPI  Patient is here being seen acutely for uncontrolled diabetes states her blood sugar has been ranging from 200s to 400s over the past 2 weeks- she was without  meds for 3  days while on vacation and admits to poor eating habits during that time as well as indulging in alcohol  States her glipizide dose on recent prescription was lowered to 5 mg once daily however she continued with previous prescription of 2 tablets daily  Developed palpitations with blood sugar of 400 patient did not seek medical care while vacationing in [place] patient has not had a recurrence of symptoms  Review of Systems   Constitutional: Positive for fatigue. Negative for chills and fever.    HENT: Negative for congestion, ear discharge, ear pain, sinus pressure, sinus pain and sore throat. Eyes: Negative for pain and discharge. Respiratory: Negative for cough, chest tightness, shortness of breath and wheezing. Cardiovascular: Positive for palpitations. Negative for chest pain and leg swelling. Gastrointestinal: Negative for abdominal pain, constipation, diarrhea, nausea and vomiting. Endocrine: Negative. Genitourinary: Negative for difficulty urinating, dysuria, frequency and urgency. Musculoskeletal: Positive for back pain. Negative for neck pain. Skin: Negative for rash. Allergic/Immunologic: Negative. Neurological: Positive for numbness. Negative for dizziness and headaches. Hematological: Negative. Psychiatric/Behavioral: Negative. Past Medical History:   Diagnosis Date    Atrial fibrillation ([facility]     Congenital heart disease     [facility]     Hyperlipidemia     Hypertension      Past Surgical History:   Procedure Laterality Date     SECTION      CHOLECYSTECTOMY      KIDNEY STONE SURGERY       Social History     Socioeconomic History    Marital status:      Spouse name: Not on file    Number of children: Not on file    Years of education: Not on file    Highest education level: Not on file   Occupational History    Not on file   Tobacco Use    Smoking status: Never Smoker    Smokeless tobacco: Never Used   Substance and Sexual Activity    Alcohol use: Never    Drug use: No    Sexual activity: Not on file   Other Topics Concern    Not on file   Social History Narrative    Not on file     Social Determinants of Health     Financial Resource Strain: Low Risk     Difficulty of Paying Living Expenses: Not hard at all   Food Insecurity: No Food Insecurity    Worried About Running Out of Food in the Last Year: Never true    Dasha of Food in the Last Year: Never true   Transportation Needs: No Transportation Needs    Lack of Transportation (Medical):  No    Lack of Transportation (Non-Medical):  No   Physical Activity:     Days of Exercise per Week: Not on file    Minutes of Exercise per Session: Not on file   Stress:     Feeling of Stress : Not on file   Social Connections:     Frequency of Communication with Friends and Family: Not on file    Frequency of Social Gatherings with Friends and Family: Not on file    Attends Faith Services: Not on file    Active Member of [address] [place] [address] or Organizations: Not on file    Attends Club or Organization Meetings: Not on file    Marital Status: Not on file   Intimate Partner Violence:     Fear of Current or Ex-Partner: Not on file    Emotionally Abused: Not on file    Physically Abused: Not on file    Sexually Abused: Not on file   Housing Stability:     Unable to Pay for Housing in the Last Year: Not on file    Number of [place] in the Last Year: Not on file    Unstable Housing in the Last Year: Not on file     Family History   Problem Relation Age of Onset    Heart Disease Mother     Diabetes Mother     Heart Attack Father     Diabetes Sister     Diabetes Brother     Diabetes Sister     Heart Disease Sister      No Known Allergies     Current Outpatient Medications   Medication Sig Dispense Refill    glipiZIDE (GLUCOTROL XL) 5 MG extended release tablet Take 2 tablets by mouth with breakfast and 1 with dinner 90 tablet 2    glipiZIDE (GLUCOTROL XL) 5 MG extended release tablet Take 1 tablet by mouth daily 90 tablet 3    dilTIAZem (TIAZAC) 240 MG extended release capsule TAKE ONE CAPSULE BY MOUTH EVERY DAY 90 capsule 0    metoprolol tartrate (LOPRESSOR) 25 MG tablet Take 1 tablet by mouth daily 60 tablet 2    celecoxib (CELEBREX) 100 MG capsule TAKE ONE CAPSULE BY MOUTH TWO TIMES A  capsule 0    meloxicam (MOBIC) 15 MG tablet Take 15 mg by mouth as needed for Pain      albuterol sulfate HFA (PROVENTIL HFA) 108 (90 Base) MCG/ACT inhaler Inhale 2 puffs into the lungs every 6 hours as needed for Wheezing or Shortness of Breath 1 Inhaler 3    FreeStyle Lancets MISC USE TO TEST ONCE TO TWICE DAILY AND AS NEEDED FOR SYMPTOMS OF IRREGULAR BLOOD GLUCOSE      rosuvastatin (CRESTOR) 20 MG tablet Take 1 tablet by mouth daily 90 tablet 3    Blood Glucose Monitoring Suppl (ACURA BLOOD GLUCOSE METER) w/Device KIT 1 Device by Does not apply route 2 times daily 1 kit 0    blood glucose monitor strips Test 1 times a day & as needed for symptoms of irregular blood glucose. Dispense sufficient amount for indicated testing frequency plus additional to accommodate PRN testing needs. 100 strip 3    blood glucose monitor kit and supplies Test 2 times a day & as needed for symptoms of irregular blood glucose. Lancets and test strips to be included 1 kit 0    aspirin 81 MG tablet Take 81 mg by mouth daily.  nitroGLYCERIN (NITROSTAT) 0.4 MG SL tablet Place 0.4 mg under the tongue every 5 minutes as needed.  acetaminophen (TYLENOL ARTHRITIS PAIN) 650 MG CR tablet Take 650 mg by mouth every 8 hours as needed.  Elastic Bandages & Supports (MEDICAL COMPRESSION STOCKINGS) MISC 1 each by Does not apply route daily Thigh high 20-30 mmhg compression stockings both legs wear daily during day and off Qhs. Wear as tolerated. Do not wear if they cause increased pain. (Patient not taking: Reported on 12/14/2021) 1 each 5    vitamin D (ERGOCALCIFEROL) 1.25 MG (34346 UT) CAPS capsule TAKE ONE CAPSULE BY MOUTH ONCE A WEEK (Patient not taking: Reported on 12/14/2021) 12 capsule 3     No current facility-administered medications for this visit. Objective    Vitals:    12/14/21 1612   BP: 126/80   Site: Right Upper Arm   Position: Sitting   Cuff Size: Large Adult   Pulse: 77   Temp: 97.3 °F (36.3 °C)   SpO2: 97%   Weight: 165 lb (74.8 kg)   Height: 5' (1.524 m)     Physical Exam  Constitutional:       General: She is not in acute distress. Appearance: She is obese. She is not ill-appearing.    HENT:      Head: dinner     Dispense:  90 tablet     Refill:  2     Medications Discontinued During This Encounter   Medication Reason    fluticasone (FLONASE) 50 MCG/ACT nasal spray Therapy completed    Compression Stockings MISC Therapy completed    ondansetron (ZOFRAN) 4 MG tablet LIST CLEANUP     Return in about 3 months (around 3/14/2022) for follow up with your PCP as directed.     Arleen Artis PA-C

## 2021-12-30 RX ORDER — CELECOXIB 100 MG/1
CAPSULE ORAL
Qty: 60 CAPSULE | Refills: 0 | Status: SHIPPED | OUTPATIENT
Start: 2021-12-30 | End: 2022-02-07

## 2021-12-30 NOTE — TELEPHONE ENCOUNTER
Last OV: 07/20/2021    Beatrice Rocha and Dr. Briana Chase are out the rest of the week.  Please approve or deny request.

## 2022-02-07 RX ORDER — CELECOXIB 100 MG/1
CAPSULE ORAL
Qty: 60 CAPSULE | Refills: 0 | Status: SHIPPED | OUTPATIENT
Start: 2022-02-07 | End: 2022-03-10

## 2022-03-10 RX ORDER — CELECOXIB 100 MG/1
CAPSULE ORAL
Qty: 60 CAPSULE | Refills: 3 | Status: SHIPPED | OUTPATIENT
Start: 2022-03-10 | End: 2022-08-01 | Stop reason: SDUPTHER

## 2022-03-18 ENCOUNTER — TELEPHONE (OUTPATIENT)
Dept: FAMILY MEDICINE CLINIC | Age: 67
End: 2022-03-18

## 2022-03-18 DIAGNOSIS — E11.8 TYPE 2 DIABETES MELLITUS WITH COMPLICATION, WITHOUT LONG-TERM CURRENT USE OF INSULIN (HCC): ICD-10-CM

## 2022-03-18 RX ORDER — GLIPIZIDE 5 MG/1
TABLET, FILM COATED, EXTENDED RELEASE ORAL
Qty: 90 TABLET | Refills: 0 | Status: SHIPPED | OUTPATIENT
Start: 2022-03-18 | End: 2022-03-21 | Stop reason: SDUPTHER

## 2022-03-18 RX ORDER — GLIPIZIDE 5 MG/1
5 TABLET, FILM COATED, EXTENDED RELEASE ORAL DAILY
Qty: 90 TABLET | Refills: 3 | Status: SHIPPED | OUTPATIENT
Start: 2022-03-18 | End: 2022-03-21 | Stop reason: ALTCHOICE

## 2022-03-18 NOTE — TELEPHONE ENCOUNTER
[address] [facility] calling office. Two prescriptions were received at office for patient for Glipizide. They are calling to verify which one is correct. Please advise. Thank you.

## 2022-03-18 NOTE — TELEPHONE ENCOUNTER
Rx request   Requested Prescriptions     Pending Prescriptions Disp Refills    glipiZIDE (GLUCOTROL XL) 5 MG extended release tablet [Pharmacy Med Name: glipiZIDE ER Oral Tablet Extended Release 24 Hour 5 MG] 90 tablet 0     Sig: TAKE 2 TABLETS BY MOUTH WITH BREAKFAST AND TAKE 1 TABLET WITH DINNER     LOV 12/14/2021  Next Visit Date:  No future appointments.

## 2022-03-18 NOTE — TELEPHONE ENCOUNTER
Patient requesting medication refill. Please approve or deny this request.    Rx requested:  Requested Prescriptions     Pending Prescriptions Disp Refills    glipiZIDE (GLUCOTROL XL) 5 MG extended release tablet 90 tablet 3     Sig: Take 1 tablet by mouth daily         Last Office Visit:   11/11/2020      Next Visit Date:  No future appointments.

## 2022-03-18 NOTE — TELEPHONE ENCOUNTER
----- Message from Meeta Walker sent at 3/18/2022  3:15 PM EDT -----  Subject: Medication Problem    QUESTIONS  Name of Medication? glipiZIDE (GLUCOTROL XL) 5 MG extended release tablet  Patient-reported dosage and instructions? two in the morning one in the   evening. What question or problem do you have with the medication? Pharmacy   received 2 different prescriptions sent in. one stating correct dosage of   two in AM and 1 in PM and the other prescription states once daily. Needs   confirmation on which one to fill. Preferred Pharmacy? [id number] - [address] phone number (if available)? [phone]  Additional Information for Provider? Needs confirmed as soon as possible. Doesn't have any medication left to make it through the weekend.   ---------------------------------------------------------------------------  --------------  CALL BACK INFO  What is the best way for the office to contact you? OK to leave message on   voicemail  Preferred Call Back Phone Number? [phone]  ---------------------------------------------------------------------------  --------------  SCRIPT ANSWERS  Relationship to Patient?  Self

## 2022-03-21 RX ORDER — GLIPIZIDE 5 MG/1
TABLET, FILM COATED, EXTENDED RELEASE ORAL
Qty: 90 TABLET | Refills: 3 | Status: SHIPPED | OUTPATIENT
Start: 2022-03-21 | End: 2022-08-08 | Stop reason: SDUPTHER

## 2022-03-24 ENCOUNTER — NURSE TRIAGE (OUTPATIENT)
Dept: OTHER | Facility: CLINIC | Age: 67
End: 2022-03-24

## 2022-03-24 NOTE — TELEPHONE ENCOUNTER
Received call from [address] [facility] with Red Flag Complaint. Subjective: Caller states \"For a couple of days I have had my L kidney hurting. I have been having off and on chest pain. I have been short of breath, especially when going up steps. I feel like my heart is racing. My blood pressure has been high. And my sugar has been acting up. \"  /100 and pulse 103      Current Symptoms: L flank pain    Onset: 2 days    Associated Symptoms: chest pain, shortness of breath, BP and HR elevated, BS fluctuation, headache    Pain Severity: chest 8/10 when it occurs; flank 7/10    Temperature: denies fever    What has been tried: N/A    LMP: NA Pregnant: NA    Recommended disposition: Go to ED Now    Care advice provided, patient verbalizes understanding; denies any other questions or concerns; instructed to call back for any new or worsening symptoms. Patient/caller agrees to proceed to nearest Emergency Department     Attention Provider: Thank you for allowing me to participate in the care of your patient. The patient was connected to triage in response to information provided to the [facility]/PSC. Please do not respond through this encounter as the response is not directed to a shared pool. Reason for Disposition   Sudden onset of severe flank pain and age > 60 years    Protocols used:  FLANK PAIN-ADULT-[place]

## 2022-05-11 RX ORDER — DILTIAZEM HYDROCHLORIDE 240 MG/1
CAPSULE, EXTENDED RELEASE ORAL
Qty: 90 CAPSULE | Refills: 0 | Status: SHIPPED | OUTPATIENT
Start: 2022-05-11 | End: 2022-08-01 | Stop reason: SDUPTHER

## 2022-05-11 NOTE — TELEPHONE ENCOUNTER
Rx requested:  Requested Prescriptions     Pending Prescriptions Disp Refills    dilTIAZem (TIAZAC) 240 MG extended release capsule [Pharmacy Med Name: dilTIAZem HCl ER Beads Oral Capsule Extended Release 24 Hour 240 MG] 90 capsule 0     Sig: TAKE ONE CAPSULE BY MOUTH EVERY DAY         Last Office Visit:   7/20/2021      Next Visit Date:  Future Appointments   Date Time Provider Aquilino Rios   5/18/2022  3:45 PM EJ Weems - CNP [facility] [address] [facility]

## 2022-06-29 ENCOUNTER — COMMUNITY OUTREACH (OUTPATIENT)
Dept: FAMILY MEDICINE CLINIC | Age: 67
End: 2022-06-29

## 2022-06-29 NOTE — PROGRESS NOTES
Patient's HM shows they are overdue for Mammogram and Colorectal Screening. PK Clean and  files searched without success. No results to attach to order nor HM updated.

## 2022-07-27 RX ORDER — CELECOXIB 100 MG/1
CAPSULE ORAL
Qty: 60 CAPSULE | Refills: 0 | OUTPATIENT
Start: 2022-07-27

## 2022-07-29 RX ORDER — CELECOXIB 100 MG/1
CAPSULE ORAL
Qty: 60 CAPSULE | Refills: 3 | OUTPATIENT
Start: 2022-07-29

## 2022-08-01 ENCOUNTER — OFFICE VISIT (OUTPATIENT)
Dept: FAMILY MEDICINE CLINIC | Age: 67
End: 2022-08-01
Payer: MEDICARE

## 2022-08-01 VITALS
HEIGHT: 60 IN | BODY MASS INDEX: 32.22 KG/M2 | RESPIRATION RATE: 15 BRPM | DIASTOLIC BLOOD PRESSURE: 70 MMHG | SYSTOLIC BLOOD PRESSURE: 102 MMHG | HEART RATE: 68 BPM | TEMPERATURE: 97.6 F | OXYGEN SATURATION: 99 %

## 2022-08-01 DIAGNOSIS — I10 ESSENTIAL HYPERTENSION: ICD-10-CM

## 2022-08-01 DIAGNOSIS — K52.9 ACUTE GASTROENTERITIS: ICD-10-CM

## 2022-08-01 DIAGNOSIS — E78.00 HYPERCHOLESTEREMIA: Primary | ICD-10-CM

## 2022-08-01 DIAGNOSIS — E11.9 TYPE 2 DIABETES MELLITUS WITHOUT COMPLICATION, WITH LONG-TERM CURRENT USE OF INSULIN (HCC): ICD-10-CM

## 2022-08-01 DIAGNOSIS — N23 KIDNEY PAIN: ICD-10-CM

## 2022-08-01 DIAGNOSIS — Z79.4 TYPE 2 DIABETES MELLITUS WITHOUT COMPLICATION, WITH LONG-TERM CURRENT USE OF INSULIN (HCC): ICD-10-CM

## 2022-08-01 LAB
BILIRUBIN, POC: NORMAL
BLOOD URINE, POC: NORMAL
CLARITY, POC: CLEAR
COLOR, POC: YELLOW
GLUCOSE URINE, POC: NORMAL
HBA1C MFR BLD: 10.3 %
KETONES, POC: NORMAL
LEUKOCYTE EST, POC: NORMAL
NITRITE, POC: NORMAL
PH, POC: 6
PROTEIN, POC: NORMAL
SPECIFIC GRAVITY, POC: 1.01
UROBILINOGEN, POC: NORMAL

## 2022-08-01 PROCEDURE — 83036 HEMOGLOBIN GLYCOSYLATED A1C: CPT | Performed by: FAMILY MEDICINE

## 2022-08-01 PROCEDURE — 99214 OFFICE O/P EST MOD 30 MIN: CPT | Performed by: FAMILY MEDICINE

## 2022-08-01 PROCEDURE — 1123F ACP DISCUSS/DSCN MKR DOCD: CPT | Performed by: FAMILY MEDICINE

## 2022-08-01 PROCEDURE — 81003 URINALYSIS AUTO W/O SCOPE: CPT | Performed by: FAMILY MEDICINE

## 2022-08-01 PROCEDURE — 3046F HEMOGLOBIN A1C LEVEL >9.0%: CPT | Performed by: FAMILY MEDICINE

## 2022-08-01 RX ORDER — EMPAGLIFLOZIN AND METFORMIN HYDROCHLORIDE 12.5; 5 MG/1; MG/1
1 TABLET ORAL 2 TIMES DAILY
Qty: 180 TABLET | Refills: 3 | Status: SHIPPED | OUTPATIENT
Start: 2022-08-01

## 2022-08-01 RX ORDER — CELECOXIB 100 MG/1
CAPSULE ORAL
Qty: 180 CAPSULE | Refills: 3 | Status: SHIPPED | OUTPATIENT
Start: 2022-08-01

## 2022-08-01 RX ORDER — ONDANSETRON 4 MG/1
4 TABLET, FILM COATED ORAL EVERY 8 HOURS PRN
Qty: 30 TABLET | Refills: 1 | Status: SHIPPED | OUTPATIENT
Start: 2022-08-01

## 2022-08-01 RX ORDER — DILTIAZEM HYDROCHLORIDE 240 MG/1
CAPSULE, EXTENDED RELEASE ORAL
Qty: 90 CAPSULE | Refills: 3 | Status: SHIPPED | OUTPATIENT
Start: 2022-08-01

## 2022-08-01 ASSESSMENT — PATIENT HEALTH QUESTIONNAIRE - PHQ9
7. TROUBLE CONCENTRATING ON THINGS, SUCH AS READING THE NEWSPAPER OR WATCHING TELEVISION: 0
SUM OF ALL RESPONSES TO PHQ QUESTIONS 1-9: 0
1. LITTLE INTEREST OR PLEASURE IN DOING THINGS: 0
SUM OF ALL RESPONSES TO PHQ QUESTIONS 1-9: 0
6. FEELING BAD ABOUT YOURSELF - OR THAT YOU ARE A FAILURE OR HAVE LET YOURSELF OR YOUR FAMILY DOWN: 0
SUM OF ALL RESPONSES TO PHQ QUESTIONS 1-9: 0
8. MOVING OR SPEAKING SO SLOWLY THAT OTHER PEOPLE COULD HAVE NOTICED. OR THE OPPOSITE, BEING SO FIGETY OR RESTLESS THAT YOU HAVE BEEN MOVING AROUND A LOT MORE THAN USUAL: 0
3. TROUBLE FALLING OR STAYING ASLEEP: 0
SUM OF ALL RESPONSES TO PHQ QUESTIONS 1-9: 0
9. THOUGHTS THAT YOU WOULD BE BETTER OFF DEAD, OR OF HURTING YOURSELF: 0
4. FEELING TIRED OR HAVING LITTLE ENERGY: 0
5. POOR APPETITE OR OVEREATING: 0
2. FEELING DOWN, DEPRESSED OR HOPELESS: 0
SUM OF ALL RESPONSES TO PHQ9 QUESTIONS 1 & 2: 0

## 2022-08-01 ASSESSMENT — ENCOUNTER SYMPTOMS
GASTROINTESTINAL NEGATIVE: 1
RESPIRATORY NEGATIVE: 1
CHEST TIGHTNESS: 0
COUGH: 0
EYES NEGATIVE: 1
RHINORRHEA: 0

## 2022-08-01 NOTE — PROGRESS NOTES
Patient is seen in follow up for   Chief Complaint   Patient presents with    Diabetes     Last A1C was 10.3 12/14/21    Flank Pain     Left side and mid section     Diabetes  She presents for her follow-up diabetic visit. She has type 2 diabetes mellitus. The initial diagnosis of diabetes was made 10 years ago. Her disease course has been stable. There are no hypoglycemic associated symptoms. Pertinent negatives for hypoglycemia include no dizziness. There are no diabetic associated symptoms. Pertinent negatives for diabetes include no fatigue. There are no hypoglycemic complications. Symptoms are stable. There are no diabetic complications. Risk factors for coronary artery disease include diabetes mellitus. Current diabetic treatment includes oral agent (monotherapy). She is compliant with treatment most of the time. Flank Pain  Pertinent negatives include no fever or numbness.    Urine normal  Past Medical History:   Diagnosis Date    Atrial fibrillation ([facility]     Congenital heart disease     Diabetes mellitus ([facility]     Hyperlipidemia     Hypertension      Patient Active Problem List    Diagnosis Date Noted    Right leg pain 07/17/2021    Edema of right lower extremity 07/12/2021    Hyperglycemia 07/12/2021    Fatigue 04/27/2021    Nonintractable headache 04/27/2021    Other chest pain 08/30/2020    Gallbladder disease 08/30/2020    Coronary atherosclerosis 10/30/2017    Cyst of kidney, acquired 10/30/2017    Effusion, left ankle 10/30/2017    Effusion, right ankle 10/30/2017    Frequency of micturition 10/30/2017    Long term (current) use of aspirin 10/30/2017    Painful micturition, unspecified 10/30/2017    Personal history of urinary calculi 10/30/2017    Personal history of urinary (tract) infections 10/30/2017    Varicose veins of bilateral lower extremities with pain 10/30/2017    Hypercholesteremia 10/31/2012    HTN (hypertension) 10/31/2012     Past Surgical History:   Procedure Laterality Date  SECTION      CHOLECYSTECTOMY      KIDNEY STONE SURGERY       Family History   Problem Relation Age of Onset    Heart Disease Mother     Diabetes Mother     Heart Attack Father     Diabetes Sister     Diabetes Brother     Diabetes Sister     Heart Disease Sister      Social History     Socioeconomic History    Marital status:      Spouse name: None    Number of children: None    Years of education: None    Highest education level: None   Tobacco Use    Smoking status: Never    Smokeless tobacco: Never   Substance and Sexual Activity    Alcohol use: Never    Drug use: No     Social Determinants of Health     Financial Resource Strain: Low Risk     Difficulty of Paying Living Expenses: Not hard at all   Food Insecurity: No Food Insecurity    Worried About Running Out of Food in the Last Year: Never true    Ran Out of Food in the Last Year: Never true   Transportation Needs: No Transportation Needs    Lack of Transportation (Medical): No    Lack of Transportation (Non-Medical): No     Current Outpatient Medications   Medication Sig Dispense Refill    celecoxib (CELEBREX) 100 MG capsule TAKE ONE CAPSULE BY MOUTH TWO TIMES A  capsule 3    dilTIAZem (TIAZAC) 240 MG extended release capsule TAKE ONE CAPSULE BY MOUTH EVERY DAY 90 capsule 3    metoprolol tartrate (LOPRESSOR) 25 MG tablet Take 1 tablet by mouth in the morning.  180 tablet 3    Empagliflozin-metFORMIN HCl (SYNJARDY) 12.5-500 MG TABS Take 1 tablet by mouth 2 times daily 180 tablet 3    ondansetron (ZOFRAN) 4 MG tablet Take 1 tablet by mouth every 8 hours as needed for Nausea or Vomiting 30 tablet 1    glipiZIDE (GLUCOTROL XL) 5 MG extended release tablet TAKE 2 TABLETS BY MOUTH WITH BREAKFAST AND TAKE 1 TABLET WITH DINNER 90 tablet 3    meloxicam (MOBIC) 15 MG tablet Take 15 mg by mouth as needed for Pain      albuterol sulfate HFA (PROVENTIL HFA) 108 (90 Base) MCG/ACT inhaler Inhale 2 puffs into the lungs every 6 hours as needed for Wheezing or Shortness of Breath 1 Inhaler 3    FreeStyle Lancets MISC USE TO TEST ONCE TO TWICE DAILY AND AS NEEDED FOR SYMPTOMS OF IRREGULAR BLOOD GLUCOSE      rosuvastatin (CRESTOR) 20 MG tablet Take 1 tablet by mouth daily 90 tablet 3    Blood Glucose Monitoring Suppl (ACURA BLOOD GLUCOSE METER) w/Device KIT 1 Device by Does not apply route 2 times daily 1 kit 0    blood glucose monitor strips Test 1 times a day & as needed for symptoms of irregular blood glucose. Dispense sufficient amount for indicated testing frequency plus additional to accommodate PRN testing needs. 100 strip 3    blood glucose monitor kit and supplies Test 2 times a day & as needed for symptoms of irregular blood glucose. Lancets and test strips to be included 1 kit 0    aspirin 81 MG tablet Take 81 mg by mouth daily. nitroGLYCERIN (NITROSTAT) 0.4 MG SL tablet Place 0.4 mg under the tongue every 5 minutes as needed. acetaminophen (TYLENOL ARTHRITIS PAIN) 650 MG CR tablet Take 650 mg by mouth every 8 hours as needed. No current facility-administered medications for this visit.      Current Outpatient Medications on File Prior to Visit   Medication Sig Dispense Refill    glipiZIDE (GLUCOTROL XL) 5 MG extended release tablet TAKE 2 TABLETS BY MOUTH WITH BREAKFAST AND TAKE 1 TABLET WITH DINNER 90 tablet 3    meloxicam (MOBIC) 15 MG tablet Take 15 mg by mouth as needed for Pain      albuterol sulfate HFA (PROVENTIL HFA) 108 (90 Base) MCG/ACT inhaler Inhale 2 puffs into the lungs every 6 hours as needed for Wheezing or Shortness of Breath 1 Inhaler 3    FreeStyle Lancets MISC USE TO TEST ONCE TO TWICE DAILY AND AS NEEDED FOR SYMPTOMS OF IRREGULAR BLOOD GLUCOSE      rosuvastatin (CRESTOR) 20 MG tablet Take 1 tablet by mouth daily 90 tablet 3    Blood Glucose Monitoring Suppl (ACURA BLOOD GLUCOSE METER) w/Device KIT 1 Device by Does not apply route 2 times daily 1 kit 0    blood glucose monitor strips Test 1 times a day & as needed for symptoms of irregular blood glucose. Dispense sufficient amount for indicated testing frequency plus additional to accommodate PRN testing needs. 100 strip 3    blood glucose monitor kit and supplies Test 2 times a day & as needed for symptoms of irregular blood glucose. Lancets and test strips to be included 1 kit 0    aspirin 81 MG tablet Take 81 mg by mouth daily. nitroGLYCERIN (NITROSTAT) 0.4 MG SL tablet Place 0.4 mg under the tongue every 5 minutes as needed. acetaminophen (TYLENOL ARTHRITIS PAIN) 650 MG CR tablet Take 650 mg by mouth every 8 hours as needed. No current facility-administered medications on file prior to visit. No Known Allergies  Health Maintenance   Topic Date Due    Pneumococcal 65+ years Vaccine (1 - PCV) Never done    Diabetic retinal exam  Never done    DTaP/Tdap/Td vaccine (1 - Tdap) Never done    Cervical cancer screen  Never done    Colorectal Cancer Screen  Never done    Breast cancer screen  Never done    Shingles vaccine (1 of 2) Never done    DEXA (modify frequency per FRAX score)  Never done    Lipids  08/31/2019    COVID-19 Vaccine (3 - Booster for Moderna series) 09/16/2021    Diabetic foot exam  10/14/2021    Diabetic microalbuminuria test  10/14/2021    Depression Screen  07/12/2022    Flu vaccine (1) 09/01/2022    A1C test (Diabetic or Prediabetic)  11/01/2022    Hepatitis C screen  Completed    HIV screen  Completed    Hepatitis A vaccine  Aged Out    Hib vaccine  Aged Out    Meningococcal (ACWY) vaccine  Aged Out       Review of Systems     Review of Systems   Constitutional:  Negative for activity change, appetite change, fatigue and fever. HENT:  Negative for congestion and rhinorrhea. Eyes: Negative. Respiratory: Negative. Negative for cough and chest tightness. Cardiovascular: Negative. Gastrointestinal: Negative. Endocrine: Negative. Genitourinary:  Positive for flank pain. Skin: Negative.     Neurological: Negative for dizziness, light-headedness and numbness. Hematological: Negative. Psychiatric/Behavioral: Negative. Physical Exam  Vitals:    08/01/22 1420   BP: 102/70   Pulse: 68   Resp: 15   Temp: 97.6 °F (36.4 °C)   SpO2: 99%   Height: 5' (1.524 m)       Physical Exam  Constitutional:       Appearance: She is well-developed. HENT:      Right Ear: External ear normal.      Left Ear: External ear normal.   Eyes:      Pupils: Pupils are equal, round, and reactive to light. Neck:      Thyroid: No thyromegaly. Cardiovascular:      Rate and Rhythm: Normal rate and regular rhythm. Heart sounds: Normal heart sounds. No murmur heard. No friction rub. No gallop. Pulmonary:      Effort: Pulmonary effort is normal. No respiratory distress. Breath sounds: No wheezing or rales. Chest:      Chest wall: No tenderness. Abdominal:      General: Bowel sounds are normal. There is no distension. Palpations: Abdomen is soft. There is no mass. Tenderness: There is no abdominal tenderness. There is no guarding or rebound. Musculoskeletal:         General: Normal range of motion. Cervical back: Normal range of motion and neck supple. Lymphadenopathy:      Cervical: No cervical adenopathy. Skin:     General: Skin is warm and dry. Neurological:      Mental Status: She is alert and oriented to person, place, and time. Cranial Nerves: No cranial nerve deficit. Coordination: Coordination normal.       Assessment   Diagnosis Orders   1. Hypercholesteremia        2.  Type 2 diabetes mellitus without complication, with long-term current use of insulin ([facility])  POCT glycosylated hemoglobin (Hb A1C)        Problem List       Hypercholesteremia - Primary    Relevant Medications    aspirin 81 MG tablet    nitroGLYCERIN (NITROSTAT) 0.4 MG SL tablet    rosuvastatin (CRESTOR) 20 MG tablet    dilTIAZem (TIAZAC) 240 MG extended release capsule    metoprolol tartrate (LOPRESSOR) 25 MG tablet       Plan  Orders Placed This Encounter   Procedures    POCT glycosylated hemoglobin (Hb A1C)     Orders Placed This Encounter   Medications    celecoxib (CELEBREX) 100 MG capsule     Sig: TAKE ONE CAPSULE BY MOUTH TWO TIMES A DAY     Dispense:  180 capsule     Refill:  3    dilTIAZem (TIAZAC) 240 MG extended release capsule     Sig: TAKE ONE CAPSULE BY MOUTH EVERY DAY     Dispense:  90 capsule     Refill:  3    metoprolol tartrate (LOPRESSOR) 25 MG tablet     Sig: Take 1 tablet by mouth in the morning. Dispense:  180 tablet     Refill:  3    Empagliflozin-metFORMIN HCl (SYNJARDY) 12.5-500 MG TABS     Sig: Take 1 tablet by mouth 2 times daily     Dispense:  180 tablet     Refill:  3    ondansetron (ZOFRAN) 4 MG tablet     Sig: Take 1 tablet by mouth every 8 hours as needed for Nausea or Vomiting     Dispense:  30 tablet     Refill:  1     Return in about 3 months (around 11/1/2022).   Ami Murry MD set-up required

## 2022-08-02 ENCOUNTER — TELEPHONE (OUTPATIENT)
Dept: FAMILY MEDICINE CLINIC | Age: 67
End: 2022-08-02

## 2022-08-02 NOTE — TELEPHONE ENCOUNTER
----- Message from Lionel Ragland sent at 8/2/2022  1:23 PM EDT -----  Subject: Message to Provider    QUESTIONS  Information for Provider? ptElizabeth Osorio indicates the medication   prescribed for diabetes is too expensive and would like to know if there's   any other options  ---------------------------------------------------------------------------  --------------  Ayleen DARLING  [phone]; OK to leave message on voicemail  ---------------------------------------------------------------------------  --------------  SCRIPT ANSWERS  Relationship to Patient?  Self

## 2022-08-03 ENCOUNTER — TELEPHONE (OUTPATIENT)
Dept: FAMILY MEDICINE CLINIC | Age: 67
End: 2022-08-03

## 2022-08-03 NOTE — TELEPHONE ENCOUNTER
Patient is calling about the script for metoprolol 25 mg tablets. She said the pharmacist did not want to fill these for her because they are different than what she has been getting. She said he called them \"plain\". So she did not  this script yet. Please advise, thank you.

## 2022-08-03 NOTE — TELEPHONE ENCOUNTER
Patient called about the script for Emily. She said that one costs $135 w/ insurance. Patient said she will stay on the glipizide and watch her diet if she has to.

## 2022-08-05 DIAGNOSIS — E11.8 TYPE 2 DIABETES MELLITUS WITH COMPLICATION, WITHOUT LONG-TERM CURRENT USE OF INSULIN (HCC): ICD-10-CM

## 2022-08-07 DIAGNOSIS — E11.8 TYPE 2 DIABETES MELLITUS WITH COMPLICATION, WITHOUT LONG-TERM CURRENT USE OF INSULIN (HCC): ICD-10-CM

## 2022-08-07 RX ORDER — GLIPIZIDE 5 MG/1
TABLET, FILM COATED, EXTENDED RELEASE ORAL
Qty: 90 TABLET | Refills: 0 | OUTPATIENT
Start: 2022-08-07

## 2022-08-08 RX ORDER — GLIPIZIDE 5 MG/1
TABLET, FILM COATED, EXTENDED RELEASE ORAL
Qty: 90 TABLET | Refills: 3 | Status: SHIPPED | OUTPATIENT
Start: 2022-08-08

## 2022-08-09 ENCOUNTER — COMMUNITY OUTREACH (OUTPATIENT)
Dept: FAMILY MEDICINE CLINIC | Age: 67
End: 2022-08-09

## 2022-08-09 NOTE — PROGRESS NOTES
Patient's HM shows they are overdue for DEXA, Mammogram, Colorectal Screening and Cervical Cancer Screening. [place] and  files searched. No results to attach to order nor HM updated.

## 2022-09-26 ENCOUNTER — TELEPHONE (OUTPATIENT)
Dept: FAMILY MEDICINE CLINIC | Age: 67
End: 2022-09-26

## 2022-12-05 ENCOUNTER — NURSE TRIAGE (OUTPATIENT)
Dept: OTHER | Facility: CLINIC | Age: 67
End: 2022-12-05

## 2022-12-05 ENCOUNTER — OFFICE VISIT (OUTPATIENT)
Dept: FAMILY MEDICINE CLINIC | Age: 67
End: 2022-12-05
Payer: MEDICARE

## 2022-12-05 VITALS
BODY MASS INDEX: 32.59 KG/M2 | OXYGEN SATURATION: 96 % | WEIGHT: 166 LBS | HEART RATE: 68 BPM | HEIGHT: 60 IN | DIASTOLIC BLOOD PRESSURE: 78 MMHG | SYSTOLIC BLOOD PRESSURE: 126 MMHG | TEMPERATURE: 97.9 F

## 2022-12-05 DIAGNOSIS — J02.9 SORE THROAT: ICD-10-CM

## 2022-12-05 DIAGNOSIS — B96.89 ACUTE BACTERIAL SINUSITIS: Primary | ICD-10-CM

## 2022-12-05 DIAGNOSIS — J01.90 ACUTE BACTERIAL SINUSITIS: Primary | ICD-10-CM

## 2022-12-05 LAB
INFLUENZA A ANTIBODY: NORMAL
INFLUENZA B ANTIBODY: NORMAL
S PYO AG THROAT QL: NORMAL

## 2022-12-05 PROCEDURE — 3078F DIAST BP <80 MM HG: CPT | Performed by: NURSE PRACTITIONER

## 2022-12-05 PROCEDURE — 87804 INFLUENZA ASSAY W/OPTIC: CPT | Performed by: NURSE PRACTITIONER

## 2022-12-05 PROCEDURE — 3074F SYST BP LT 130 MM HG: CPT | Performed by: NURSE PRACTITIONER

## 2022-12-05 PROCEDURE — 99213 OFFICE O/P EST LOW 20 MIN: CPT | Performed by: NURSE PRACTITIONER

## 2022-12-05 PROCEDURE — 87880 STREP A ASSAY W/OPTIC: CPT | Performed by: NURSE PRACTITIONER

## 2022-12-05 PROCEDURE — 1123F ACP DISCUSS/DSCN MKR DOCD: CPT | Performed by: NURSE PRACTITIONER

## 2022-12-05 RX ORDER — AMOXICILLIN AND CLAVULANATE POTASSIUM 875; 125 MG/1; MG/1
1 TABLET, FILM COATED ORAL 2 TIMES DAILY
Qty: 20 TABLET | Refills: 0 | Status: SHIPPED | OUTPATIENT
Start: 2022-12-05 | End: 2022-12-15

## 2022-12-05 RX ORDER — FLUTICASONE PROPIONATE 50 MCG
2 SPRAY, SUSPENSION (ML) NASAL DAILY
Qty: 48 G | Refills: 1 | Status: SHIPPED | OUTPATIENT
Start: 2022-12-05

## 2022-12-05 RX ORDER — BENZONATATE 200 MG/1
200 CAPSULE ORAL 3 TIMES DAILY PRN
Qty: 30 CAPSULE | Refills: 0 | Status: SHIPPED | OUTPATIENT
Start: 2022-12-05 | End: 2022-12-15

## 2022-12-05 NOTE — TELEPHONE ENCOUNTER
Location of patient: [address] call from [place] at [employer] with [employer]. Subjective: Caller states \"I have been dizzy\"     Current Symptoms: No dizziness at time of call. Headache, fatigue, sore throat and dizziness. COVID tested yesterday at [facility] and still waiting on results. No chest pain or trouble breathing at time of call. Onset: 6 days ago; worsening    Associated Symptoms: reduced activity, increased sleepiness    Pain Severity: 0/10; N/A; none    Temperature: denies fever     LMP: NA Pregnant: NA    Recommended disposition: Go to [facility]    Care advice provided, patient verbalizes understanding; denies any other questions or concerns; instructed to call back for any new or worsening symptoms. Patient/Caller agrees with recommended disposition; writer provided warm transfer to [employer] at [employer] for appointment scheduling    Attention Provider: Thank you for allowing me to participate in the care of your patient. The patient was connected to triage in response to information provided to the ECC/PSC. Please do not respond through this encounter as the response is not directed to a shared pool.     Reason for Disposition   MODERATE weakness (i.e., interferes with work, school, normal activities) and cause unknown  (Exceptions: Weakness with acute minor illness, or weakness from poor fluid intake.)    Protocols used: Weakness (Generalized) and Fatigue-ADULT-OH

## 2022-12-05 NOTE — PROGRESS NOTES
Subjective:      Patient ID: Marianela López is a 78 y.o. female who presents today for:  Chief Complaint   Patient presents with    URI     Pt was traveling, fever, headaches, dizzy , sore throat, fatigue start 11/29 , pt state sugar has yael high 300 , covid neg in another clinic        HPI  Patient is here with c/o fever off and on for the last 7 days. Fatigued for the last week after traveling. Reports \"bad cough that keeps her up\". Says she is SOB and wheezing at times. Reports chest pain with the cough. Says cough is producing yellow mucus. Says she has some sinus and nasal congestion as well. She is taking cold and flu medications. Illness causing her BS to elevate up to 300, normally about 170. Says she was seen yesterday and had a negative Covid test.    Past Medical History:   Diagnosis Date    Atrial fibrillation ([facility] [address]     Congenital heart disease     Diabetes mellitus ([facility] [address]     Hyperlipidemia     Hypertension      Past Surgical History:   Procedure Laterality Date    [address]     Social History     Socioeconomic History    Marital status:      Spouse name: Not on file    Number of children: Not on file    Years of education: Not on file    Highest education level: Not on file   Occupational History    Not on file   Tobacco Use    Smoking status: Never    Smokeless tobacco: Never   Substance and Sexual Activity    Alcohol use: Never    Drug use: No    Sexual activity: Not on file   Other Topics Concern    Not on file   Social History Narrative    Not on file     Social Determinants of Health     Financial Resource Strain: Low Risk     Difficulty of Paying Living Expenses: Not hard at all   Food Insecurity: No Food Insecurity    Worried About [address] in the Last Year: Never true    [address] in the Last Year: Never true   Transportation Needs: No Transportation Needs    Lack of Transportation (Medical):  No Lack of Transportation (Non-Medical): No   Physical Activity: Not on file   Stress: Not on file   Social Connections: Not on file   Intimate Partner Violence: Not on file   Housing Stability: Not on file     Family History   Problem Relation Age of Onset    Heart Disease Mother     Diabetes Mother     Heart Attack Father     Diabetes Sister     Diabetes Brother     Diabetes Sister     Heart Disease Sister      No Known Allergies  Current Outpatient Medications   Medication Sig Dispense Refill    amoxicillin-clavulanate (AUGMENTIN) 875-125 MG per tablet Take 1 tablet by mouth 2 times daily for 10 days 20 tablet 0    fluticasone (FLONASE) 50 MCG/ACT nasal spray 2 sprays by Each Nostril route daily 48 g 1    benzonatate (TESSALON) 200 MG capsule Take 1 capsule by mouth 3 times daily as needed for Cough 30 capsule 0    metoprolol tartrate (LOPRESSOR) 25 MG tablet Take 1 tablet by mouth in the morning. 180 tablet 3    SITagliptin (JANUVIA) 100 MG tablet Take 1 tablet by mouth in the morning. 90 tablet 1    celecoxib (CELEBREX) 100 MG capsule TAKE ONE CAPSULE BY MOUTH TWO TIMES A  capsule 3    Empagliflozin-metFORMIN HCl (SYNJARDY) 12.5-500 MG TABS Take 1 tablet by mouth 2 times daily 180 tablet 3    meloxicam (MOBIC) 15 MG tablet Take 15 mg by mouth as needed for Pain      albuterol sulfate HFA (PROVENTIL HFA) 108 (90 Base) MCG/ACT inhaler Inhale 2 puffs into the lungs every 6 hours as needed for Wheezing or Shortness of Breath 1 Inhaler 3    FreeStyle Lancets MISC USE TO TEST ONCE TO TWICE DAILY AND AS NEEDED FOR SYMPTOMS OF IRREGULAR BLOOD GLUCOSE      Blood Glucose Monitoring Suppl (ACURA BLOOD GLUCOSE METER) w/Device KIT 1 Device by Does not apply route 2 times daily 1 kit 0    blood glucose monitor strips Test 1 times a day & as needed for symptoms of irregular blood glucose. Dispense sufficient amount for indicated testing frequency plus additional to accommodate PRN testing needs.  100 strip 3    blood glucose monitor kit and supplies Test 2 times a day & as needed for symptoms of irregular blood glucose. Lancets and test strips to be included 1 kit 0    aspirin 81 MG tablet Take 81 mg by mouth daily. nitroGLYCERIN (NITROSTAT) 0.4 MG SL tablet Place 0.4 mg under the tongue every 5 minutes as needed. acetaminophen (TYLENOL) 650 MG extended release tablet Take 650 mg by mouth every 8 hours as needed. glipiZIDE (GLUCOTROL XL) 5 MG extended release tablet TAKE TWO TABLETS BY MOUTH WITH BREAKFAST AND TAKE ONE TABLET WITH DINNER 90 tablet 0    dilTIAZem (TIAZAC) 240 MG extended release capsule TAKE ONE CAPSULE BY MOUTH EVERY DAY (Patient not taking: Reported on 12/5/2022) 90 capsule 3     No current facility-administered medications for this visit. Review of Systems   Constitutional:  Positive for activity change, chills, fatigue and fever. Negative for appetite change, diaphoresis and unexpected weight change. HENT:  Positive for congestion, postnasal drip, rhinorrhea, sinus pressure and sinus pain. Negative for ear discharge, ear pain, sneezing, sore throat, tinnitus, trouble swallowing and voice change. Respiratory:  Positive for cough. Negative for chest tightness, shortness of breath, wheezing and stridor. Cardiovascular:  Negative for chest pain. Gastrointestinal:  Negative for abdominal pain, diarrhea, nausea and vomiting. Musculoskeletal:  Negative for arthralgias and myalgias. Skin:  Negative for rash. Objective:   /78   Pulse 68   Temp 97.9 °F (36.6 °C)   Ht 5' (1.524 m)   Wt 166 lb (75.3 kg) Comment: per pt  SpO2 96%   BMI 32.42 kg/m²     Physical Exam  Vitals reviewed. Constitutional:       General: She is awake. She is not in acute distress. Appearance: Normal appearance. She is well-developed, well-groomed and normal weight. She is not ill-appearing, toxic-appearing or diaphoretic. HENT:      Head: Normocephalic and atraumatic.       Right Ear: Hearing, tympanic membrane, ear canal and external ear normal.      Left Ear: Hearing, tympanic membrane, ear canal and external ear normal.      Nose: Congestion present. Right Turbinates: Swollen. Left Turbinates: Swollen. Right Sinus: Maxillary sinus tenderness and frontal sinus tenderness present. Left Sinus: Maxillary sinus tenderness and frontal sinus tenderness present. Mouth/Throat:      Mouth: Mucous membranes are moist.      Pharynx: Oropharynx is clear. No oropharyngeal exudate or posterior oropharyngeal erythema. Eyes:      General: Lids are normal.      Extraocular Movements: Extraocular movements intact. Conjunctiva/sclera: Conjunctivae normal.   Cardiovascular:      Rate and Rhythm: Normal rate and regular rhythm. Pulses: Normal pulses. Heart sounds: Normal heart sounds, S1 normal and S2 normal.   Pulmonary:      Effort: Pulmonary effort is normal.      Breath sounds: Normal breath sounds and air entry. Musculoskeletal:         General: Normal range of motion. Cervical back: Normal range of motion and neck supple. No tenderness. Lymphadenopathy:      Cervical: No cervical adenopathy. Skin:     General: Skin is warm and dry. Capillary Refill: Capillary refill takes less than 2 seconds. Neurological:      General: No focal deficit present. Mental Status: She is alert and oriented to person, place, and time. Mental status is at baseline. Psychiatric:         Attention and Perception: Attention and perception normal.         Mood and Affect: Mood and affect normal.         Speech: Speech normal.         Behavior: Behavior normal. Behavior is cooperative. Thought Content: Thought content normal.         Cognition and Memory: Cognition and memory normal.         Judgment: Judgment normal.       Assessment:       Diagnosis Orders   1.  Acute bacterial sinusitis  amoxicillin-clavulanate (AUGMENTIN) 875-125 MG per tablet fluticasone (FLONASE) 50 MCG/ACT nasal spray    benzonatate (TESSALON) 200 MG capsule      2. Sore throat  POCT Influenza A/B    POCT rapid strep A        Results for POC orders placed in visit on 22   POCT Influenza A/B   Result Value Ref Range    Influenza A Ab neg     Influenza B Ab neg    POCT rapid strep A   Result Value Ref Range    Strep A Ag None Detected None Detected      Plan:     Assessment & Plan   [facility] was seen today for uri. Diagnoses and all orders for this visit:    Sore throat  -     POCT Influenza A/B  -     POCT rapid strep A    Type 2 diabetes mellitus with complication, without long-term current use of insulin (HCC)    Hypercholesteremia    Acute bacterial sinusitis  -     amoxicillin-clavulanate (AUGMENTIN) 875-125 MG per tablet; Take 1 tablet by mouth 2 times daily for 10 days  -     fluticasone (FLONASE) 50 MCG/ACT nasal spray; 2 sprays by Each Nostril route daily  -     benzonatate (TESSALON) 200 MG capsule; Take 1 capsule by mouth 3 times daily as needed for Cough    Discussed with patient Flu testing is negative. Will treat for bacterial sinus infection. Advised to take ANTB with food and increase fluids. Discussed administration and SE of the ANTB as well as using probiotic or eating yogurt while on the ANTB. Advised may use OTC decongestant and nasal steroid to help with symptoms as needed. Discussed with patient should start to see improvement within 3-4 days and should f/u with worsening sx or with no improvement.        Orders Placed This Encounter   Procedures    POCT Influenza A/B    POCT rapid strep A     Orders Placed This Encounter   Medications    amoxicillin-clavulanate (AUGMENTIN) 875-125 MG per tablet     Sig: Take 1 tablet by mouth 2 times daily for 10 days     Dispense:  20 tablet     Refill:  0    fluticasone (FLONASE) 50 MCG/ACT nasal spray     Si sprays by Each Nostril route daily     Dispense:  48 g     Refill:  1    benzonatate (TESSALON) 200 MG capsule     Sig: Take 1 capsule by mouth 3 times daily as needed for Cough     Dispense:  30 capsule     Refill:  0     Medications Discontinued During This Encounter   Medication Reason    rosuvastatin (CRESTOR) 20 MG tablet LIST CLEANUP    ondansetron (ZOFRAN) 4 MG tablet LIST CLEANUP     Return for if symptoms do not improve in 3-5 days, worsening of condition. Reviewed with the patient/family: current clinical status & medications. Side effects of the medication prescribed today, as well as treatment plan/rationale and result expectations have been discussed with the patient/family who expresses understanding. Patient will be discharged home in stable condition. Follow up with PCP to evaluate treatment results or return if symptoms worsen or fail to improve. Discussed signs and symptoms which require immediate follow-up in ED/call to 911. Understanding verbalized. I have reviewed the patient's medical history in detail and updated the computerized patient record.     Ria Erazo, EJ - CNP

## 2022-12-06 DIAGNOSIS — E11.8 TYPE 2 DIABETES MELLITUS WITH COMPLICATION, WITHOUT LONG-TERM CURRENT USE OF INSULIN (HCC): ICD-10-CM

## 2022-12-06 RX ORDER — GLIPIZIDE 5 MG/1
TABLET, FILM COATED, EXTENDED RELEASE ORAL
Qty: 90 TABLET | Refills: 0 | Status: SHIPPED | OUTPATIENT
Start: 2022-12-06

## 2022-12-06 NOTE — TELEPHONE ENCOUNTER
requesting medication refill. Please approve or deny this request.    Rx requested:  Requested Prescriptions     Pending Prescriptions Disp Refills    glipiZIDE (GLUCOTROL XL) 5 MG extended release tablet [Pharmacy Med Name: glipiZIDE ER Oral Tablet Extended Release 24 Hour 5 MG] 90 tablet 0     Sig: TAKE TWO TABLETS BY MOUTH WITH BREAKFAST AND TAKE ONE TABLET WITH DINNER         Last Office Visit:   8/1/2022      Next Visit Date:  No future appointments.

## 2022-12-08 ASSESSMENT — ENCOUNTER SYMPTOMS
WHEEZING: 0
COUGH: 1
DIARRHEA: 0
RHINORRHEA: 1
NAUSEA: 0
SHORTNESS OF BREATH: 0
SORE THROAT: 0
VOMITING: 0
STRIDOR: 0
VOICE CHANGE: 0
ABDOMINAL PAIN: 0
CHEST TIGHTNESS: 0
SINUS PRESSURE: 1
TROUBLE SWALLOWING: 0
SINUS PAIN: 1

## 2023-01-16 ENCOUNTER — TELEPHONE (OUTPATIENT)
Dept: FAMILY MEDICINE CLINIC | Age: 68
End: 2023-01-16

## 2023-01-16 ENCOUNTER — TELEPHONE (OUTPATIENT)
Dept: GASTROENTEROLOGY | Age: 68
End: 2023-01-16

## 2023-01-16 DIAGNOSIS — Z12.31 ENCOUNTER FOR SCREENING MAMMOGRAM FOR MALIGNANT NEOPLASM OF BREAST: Primary | ICD-10-CM

## 2023-01-20 DIAGNOSIS — E11.8 TYPE 2 DIABETES MELLITUS WITH COMPLICATION, WITHOUT LONG-TERM CURRENT USE OF INSULIN (HCC): ICD-10-CM

## 2023-01-23 ENCOUNTER — TELEPHONE (OUTPATIENT)
Dept: FAMILY MEDICINE CLINIC | Age: 68
End: 2023-01-23

## 2023-01-23 ENCOUNTER — NURSE TRIAGE (OUTPATIENT)
Dept: OTHER | Facility: CLINIC | Age: 68
End: 2023-01-23

## 2023-01-23 DIAGNOSIS — E11.8 TYPE 2 DIABETES MELLITUS WITH COMPLICATION, WITHOUT LONG-TERM CURRENT USE OF INSULIN (HCC): ICD-10-CM

## 2023-01-23 RX ORDER — GLIPIZIDE 5 MG/1
TABLET, FILM COATED, EXTENDED RELEASE ORAL
Qty: 90 TABLET | Refills: 3 | Status: SHIPPED | OUTPATIENT
Start: 2023-01-23

## 2023-01-23 RX ORDER — GLIPIZIDE 5 MG/1
TABLET, FILM COATED, EXTENDED RELEASE ORAL
Qty: 90 TABLET | Refills: 0 | Status: SHIPPED | OUTPATIENT
Start: 2023-01-23 | End: 2023-01-23 | Stop reason: SDUPTHER

## 2023-01-23 NOTE — TELEPHONE ENCOUNTER
Patient is calling in because her Glipizide refill that was done today apparently did not go through to the pharmacy     It said it did but patient has been in contact with Al[facility] and they are the ones that told her to have us resend RX.     Patient is out of her medication   see refill of today

## 2023-01-23 NOTE — TELEPHONE ENCOUNTER
Location of patient: [address] call from [place] at [employer] with [employer]. Subjective: Caller states \"I didn't take the pills for 3 days waiting for it. Last night my guard was 425. It is 290 right now. It never went this high. I need my pills. I take it 3 times a day. Can we make sure that medication gets called in today. \"     Current Symptoms: 290 FSBS. This morning. Last night 425. Has not been >300 more than twice. Dizziness yesterday, not today. Onset: 3 days ago; sudden, intermittent    Associated Symptoms: no confusion. No rapid pulse. Fatigue. Pain Severity: 0/10; ;     Temperature: denies     What has been tried: drinking water. Checking FSBS. States called PCP Friday requesting refill on diabetic medication, has not heard response. LMP: NA Pregnant: NA    Recommended disposition:  Call transferred to PCP now    Care advice provided, patient verbalizes understanding; denies any other questions or concerns; instructed to call back for any new or worsening symptoms. Writer provided warm transfer to Kami Johnson at [facility] for discussion of medication refill    Attention Provider: Thank you for allowing me to participate in the care of your patient. The patient was connected to triage in response to information provided to the ECC/PSC. Please do not respond through this encounter as the response is not directed to a shared pool.         Reason for Disposition   Caller has URGENT medication or insulin pump question and triager unable to answer question    Protocols used: Diabetes - High Blood Sugar-ADULT-[place]

## 2023-03-08 ENCOUNTER — TELEPHONE (OUTPATIENT)
Dept: FAMILY MEDICINE CLINIC | Age: 68
End: 2023-03-08

## 2023-03-09 DIAGNOSIS — E11.8 TYPE 2 DIABETES MELLITUS WITH COMPLICATION, WITHOUT LONG-TERM CURRENT USE OF INSULIN (HCC): Primary | ICD-10-CM

## 2023-03-20 ENCOUNTER — TELEMEDICINE (OUTPATIENT)
Dept: FAMILY MEDICINE CLINIC | Age: 68
End: 2023-03-20
Payer: MEDICARE

## 2023-03-20 DIAGNOSIS — Z00.00 INITIAL MEDICARE ANNUAL WELLNESS VISIT: Primary | ICD-10-CM

## 2023-03-20 PROCEDURE — G0438 PPPS, INITIAL VISIT: HCPCS | Performed by: NURSE PRACTITIONER

## 2023-03-20 PROCEDURE — 1123F ACP DISCUSS/DSCN MKR DOCD: CPT | Performed by: NURSE PRACTITIONER

## 2023-03-20 ASSESSMENT — PATIENT HEALTH QUESTIONNAIRE - PHQ9
SUM OF ALL RESPONSES TO PHQ QUESTIONS 1-9: 0
1. LITTLE INTEREST OR PLEASURE IN DOING THINGS: 0
SUM OF ALL RESPONSES TO PHQ QUESTIONS 1-9: 0
2. FEELING DOWN, DEPRESSED OR HOPELESS: 0
SUM OF ALL RESPONSES TO PHQ9 QUESTIONS 1 & 2: 0

## 2023-03-20 ASSESSMENT — LIFESTYLE VARIABLES
HOW OFTEN DO YOU HAVE A DRINK CONTAINING ALCOHOL: MONTHLY OR LESS
HOW MANY STANDARD DRINKS CONTAINING ALCOHOL DO YOU HAVE ON A TYPICAL DAY: 1 OR 2

## 2023-03-21 NOTE — PATIENT INSTRUCTIONS
diabetes, high blood pressure, and high cholesterol. If you think you may have a problem with alcohol or drug use, talk to your doctor. Medicines    Take your medicines exactly as prescribed. Call your doctor if you think you are having a problem with your medicine.     If your doctor recommends aspirin, take the amount directed each day. Make sure you take aspirin and not another kind of pain reliever, such as acetaminophen (Tylenol). When should you call for help? Call 911 if you have symptoms of a heart attack. These may include:    Chest pain or pressure, or a strange feeling in the chest.     Sweating.     Shortness of breath.     Pain, pressure, or a strange feeling in the back, neck, jaw, or upper belly or in one or both shoulders or arms.     Lightheadedness or sudden weakness.     A fast or irregular heartbeat. After you call 911, the  may tell you to chew 1 adult-strength or 2 to 4 low-dose aspirin. Wait for an ambulance. Do not try to drive yourself. Watch closely for changes in your health, and be sure to contact your doctor if you have any problems. Where can you learn more? Go to http://www.moore.com/ and enter F075 to learn more about \"A Healthy Heart: Care Instructions. \"  Current as of: September 7, 2022               Content Version: 13.6  © 2006-2023 Healthwise, Incorporated. Care instructions adapted under license by [facility] . If you have questions about a medical condition or this instruction, always ask your healthcare professional. [place] [address] any warranty or liability for your use of this information. Personalized Preventive Plan for Benji Rainey - 3/20/2023  Medicare offers a range of preventive health benefits. Some of the tests and screenings are paid in full while other may be subject to a deductible, co-insurance, and/or copay.     Some of these benefits include a comprehensive review of your medical history including

## 2023-03-21 NOTE — PROGRESS NOTES
regularly involved in providing care):   Patient Care Team:  Glennon Rinne, MD as PCP - General (Family Medicine)  Glennon Rinne, MD as PCP - Empaneled Provider     Reviewed and updated this visit:  Tobacco  Allergies  Meds  Problems  Med Hx  Surg Hx  Soc Hx  Fam Hx           [place], was evaluated through a synchronous (real-time) audio-video encounter. The patient (or guardian if applicable) is aware that this is a billable service, which includes applicable co-pays. This Virtual Visit was conducted with patient's (and/or legal guardian's) consent. The visit was conducted pursuant to the emergency declaration under the [facility], [address] [place] [address] [facility] authority and the [employer] General Act. Patient identification was verified, and a caregiver was present when appropriate.    The patient was located at Home: [place] [id number]  Provider was located at [facility] ([place] [address]): [address] [place] [address], [address] [place] [address], [address]  [address] [facility]

## 2023-05-01 DIAGNOSIS — E11.8 TYPE 2 DIABETES MELLITUS WITH COMPLICATION, WITHOUT LONG-TERM CURRENT USE OF INSULIN (HCC): ICD-10-CM

## 2023-05-01 RX ORDER — GLIPIZIDE 5 MG/1
TABLET, FILM COATED, EXTENDED RELEASE ORAL
Qty: 270 TABLET | Refills: 0 | Status: SHIPPED | OUTPATIENT
Start: 2023-05-01

## 2023-05-02 ENCOUNTER — OFFICE VISIT (OUTPATIENT)
Dept: FAMILY MEDICINE CLINIC | Age: 68
End: 2023-05-02
Payer: MEDICARE

## 2023-05-02 VITALS
SYSTOLIC BLOOD PRESSURE: 134 MMHG | WEIGHT: 164 LBS | DIASTOLIC BLOOD PRESSURE: 80 MMHG | HEIGHT: 60 IN | HEART RATE: 78 BPM | BODY MASS INDEX: 32.2 KG/M2

## 2023-05-02 DIAGNOSIS — E78.00 HYPERCHOLESTEREMIA: ICD-10-CM

## 2023-05-02 DIAGNOSIS — Z12.39 ENCOUNTER FOR SCREENING FOR MALIGNANT NEOPLASM OF BREAST, UNSPECIFIED SCREENING MODALITY: ICD-10-CM

## 2023-05-02 DIAGNOSIS — I10 PRIMARY HYPERTENSION: ICD-10-CM

## 2023-05-02 DIAGNOSIS — E11.8 TYPE 2 DIABETES MELLITUS WITH COMPLICATION, WITHOUT LONG-TERM CURRENT USE OF INSULIN (HCC): ICD-10-CM

## 2023-05-02 DIAGNOSIS — R07.89 OTHER CHEST PAIN: ICD-10-CM

## 2023-05-02 DIAGNOSIS — E11.8 TYPE 2 DIABETES MELLITUS WITH COMPLICATION, WITHOUT LONG-TERM CURRENT USE OF INSULIN (HCC): Primary | ICD-10-CM

## 2023-05-02 PROBLEM — M25.472 EFFUSION, LEFT ANKLE: Status: RESOLVED | Noted: 2017-10-30 | Resolved: 2023-05-02

## 2023-05-02 PROBLEM — R35.0 FREQUENCY OF MICTURITION: Status: RESOLVED | Noted: 2017-10-30 | Resolved: 2023-05-02

## 2023-05-02 PROBLEM — M25.471 EFFUSION, RIGHT ANKLE: Status: RESOLVED | Noted: 2017-10-30 | Resolved: 2023-05-02

## 2023-05-02 PROBLEM — M79.604 RIGHT LEG PAIN: Status: RESOLVED | Noted: 2021-07-17 | Resolved: 2023-05-02

## 2023-05-02 PROBLEM — R01.1 SYSTOLIC MURMUR: Status: ACTIVE | Noted: 2023-05-02

## 2023-05-02 PROBLEM — Z95.820 STATUS POST ANGIOPLASTY WITH STENT: Status: ACTIVE | Noted: 2023-05-02

## 2023-05-02 PROBLEM — R73.9 HYPERGLYCEMIA: Status: RESOLVED | Noted: 2021-07-12 | Resolved: 2023-05-02

## 2023-05-02 LAB
ALBUMIN SERPL-MCNC: 4.2 G/DL (ref 3.5–4.6)
ALP SERPL-CCNC: 89 U/L (ref 40–130)
ALT SERPL-CCNC: 15 U/L (ref 0–33)
ANION GAP SERPL CALCULATED.3IONS-SCNC: 13 MEQ/L (ref 9–15)
AST SERPL-CCNC: 21 U/L (ref 0–35)
BASOPHILS # BLD: 0.1 K/UL (ref 0–0.2)
BASOPHILS NFR BLD: 0.8 %
BILIRUB SERPL-MCNC: 0.5 MG/DL (ref 0.2–0.7)
BUN SERPL-MCNC: 11 MG/DL (ref 8–23)
CALCIUM SERPL-MCNC: 9.7 MG/DL (ref 8.5–9.9)
CHLORIDE SERPL-SCNC: 101 MEQ/L (ref 95–107)
CHOLEST SERPL-MCNC: 274 MG/DL (ref 0–199)
CO2 SERPL-SCNC: 21 MEQ/L (ref 20–31)
CREAT SERPL-MCNC: 0.43 MG/DL (ref 0.5–0.9)
CREAT UR-MCNC: 54.8 MG/DL
EOSINOPHIL # BLD: 0.1 K/UL (ref 0–0.7)
EOSINOPHIL NFR BLD: 1 %
ERYTHROCYTE [DISTWIDTH] IN BLOOD BY AUTOMATED COUNT: 13 % (ref 11.5–14.5)
GLOBULIN SER CALC-MCNC: 2.5 G/DL (ref 2.3–3.5)
GLUCOSE SERPL-MCNC: 319 MG/DL (ref 70–99)
HBA1C MFR BLD: 10.5 % (ref 4.8–5.9)
HCT VFR BLD AUTO: 45.7 % (ref 37–47)
HDLC SERPL-MCNC: 68 MG/DL (ref 40–59)
HGB BLD-MCNC: 15.4 G/DL (ref 12–16)
LDLC SERPL CALC-MCNC: 178 MG/DL (ref 0–129)
LYMPHOCYTES # BLD: 2 K/UL (ref 1–4.8)
LYMPHOCYTES NFR BLD: 31.1 %
MCH RBC QN AUTO: 31 PG (ref 27–31.3)
MCHC RBC AUTO-ENTMCNC: 33.7 % (ref 33–37)
MCV RBC AUTO: 92.1 FL (ref 79.4–94.8)
MICROALBUMIN UR-MCNC: <1.2 MG/DL
MICROALBUMIN/CREAT UR-RTO: NORMAL MG/G (ref 0–30)
MONOCYTES # BLD: 0.4 K/UL (ref 0.2–0.8)
MONOCYTES NFR BLD: 6.8 %
NEUTROPHILS # BLD: 3.9 K/UL (ref 1.4–6.5)
NEUTS SEG NFR BLD: 60.3 %
PLATELET # BLD AUTO: 220 K/UL (ref 130–400)
POTASSIUM SERPL-SCNC: 4.4 MEQ/L (ref 3.4–4.9)
PROT SERPL-MCNC: 6.7 G/DL (ref 6.3–8)
RBC # BLD AUTO: 4.96 M/UL (ref 4.2–5.4)
SODIUM SERPL-SCNC: 135 MEQ/L (ref 135–144)
TRIGL SERPL-MCNC: 141 MG/DL (ref 0–150)
WBC # BLD AUTO: 6.5 K/UL (ref 4.8–10.8)

## 2023-05-02 PROCEDURE — 93000 ELECTROCARDIOGRAM COMPLETE: CPT | Performed by: NURSE PRACTITIONER

## 2023-05-02 PROCEDURE — 2022F DILAT RTA XM EVC RTNOPTHY: CPT | Performed by: NURSE PRACTITIONER

## 2023-05-02 PROCEDURE — G8427 DOCREV CUR MEDS BY ELIG CLIN: HCPCS | Performed by: NURSE PRACTITIONER

## 2023-05-02 PROCEDURE — 3046F HEMOGLOBIN A1C LEVEL >9.0%: CPT | Performed by: NURSE PRACTITIONER

## 2023-05-02 PROCEDURE — 99214 OFFICE O/P EST MOD 30 MIN: CPT | Performed by: NURSE PRACTITIONER

## 2023-05-02 PROCEDURE — 1123F ACP DISCUSS/DSCN MKR DOCD: CPT | Performed by: NURSE PRACTITIONER

## 2023-05-02 PROCEDURE — 3017F COLORECTAL CA SCREEN DOC REV: CPT | Performed by: NURSE PRACTITIONER

## 2023-05-02 PROCEDURE — G8400 PT W/DXA NO RESULTS DOC: HCPCS | Performed by: NURSE PRACTITIONER

## 2023-05-02 PROCEDURE — 3074F SYST BP LT 130 MM HG: CPT | Performed by: NURSE PRACTITIONER

## 2023-05-02 PROCEDURE — 1090F PRES/ABSN URINE INCON ASSESS: CPT | Performed by: NURSE PRACTITIONER

## 2023-05-02 PROCEDURE — 1036F TOBACCO NON-USER: CPT | Performed by: NURSE PRACTITIONER

## 2023-05-02 PROCEDURE — G8417 CALC BMI ABV UP PARAM F/U: HCPCS | Performed by: NURSE PRACTITIONER

## 2023-05-02 PROCEDURE — 3078F DIAST BP <80 MM HG: CPT | Performed by: NURSE PRACTITIONER

## 2023-05-02 RX ORDER — CLOPIDOGREL BISULFATE 75 MG/1
TABLET ORAL
COMMUNITY
Start: 2023-05-01

## 2023-05-02 RX ORDER — NITROGLYCERIN 0.4 MG/1
0.4 TABLET SUBLINGUAL EVERY 5 MIN PRN
Qty: 25 TABLET | Refills: 1 | Status: SHIPPED | OUTPATIENT
Start: 2023-05-02

## 2023-05-02 RX ORDER — METFORMIN HYDROCHLORIDE 500 MG/1
2000 TABLET, EXTENDED RELEASE ORAL
Qty: 120 TABLET | Refills: 5 | Status: SHIPPED | OUTPATIENT
Start: 2023-05-02

## 2023-05-02 ASSESSMENT — ENCOUNTER SYMPTOMS
BLURRED VISION: 0
ORTHOPNEA: 0

## 2023-05-04 ENCOUNTER — TELEPHONE (OUTPATIENT)
Dept: FAMILY MEDICINE CLINIC | Age: 68
End: 2023-05-04

## 2023-05-04 NOTE — TELEPHONE ENCOUNTER
----- Message from Maame Irizarry sent at 5/4/2023  8:50 AM EDT -----  Subject: Message to Provider    QUESTIONS  Information for Provider? Patient stated she recently had EKG in office   with provider Nino Carr and stated the cardiologist office is   requesting results to go over, she is requesting a call to the office to   receive results 45- Dr. Katelin Rogers Please contact patient if   additional information is needed. ---------------------------------------------------------------------------  --------------  Quang Thompson [facility]  [phone]; OK to leave message on voicemail  ---------------------------------------------------------------------------  --------------  SCRIPT ANSWERS  Relationship to Patient?  Self

## 2023-05-08 ASSESSMENT — ENCOUNTER SYMPTOMS
GASTROINTESTINAL NEGATIVE: 1
ALLERGIC/IMMUNOLOGIC NEGATIVE: 1
SHORTNESS OF BREATH: 0
CONSTIPATION: 0
BLOOD IN STOOL: 0
VISUAL CHANGE: 0
VOICE CHANGE: 0
ANAL BLEEDING: 0
ABDOMINAL PAIN: 0
DIARRHEA: 0
TROUBLE SWALLOWING: 0
RESPIRATORY NEGATIVE: 1
RECTAL PAIN: 0
EYES NEGATIVE: 1
COLOR CHANGE: 0

## 2023-05-19 LAB
LV EF: 64 %
LVEF MODALITY: NORMAL

## 2023-06-21 ENCOUNTER — OFFICE VISIT (OUTPATIENT)
Dept: FAMILY MEDICINE CLINIC | Age: 68
End: 2023-06-21
Payer: MEDICARE

## 2023-06-21 VITALS
OXYGEN SATURATION: 98 % | BODY MASS INDEX: 32.2 KG/M2 | DIASTOLIC BLOOD PRESSURE: 64 MMHG | HEART RATE: 76 BPM | TEMPERATURE: 98 F | SYSTOLIC BLOOD PRESSURE: 122 MMHG | HEIGHT: 60 IN | WEIGHT: 164 LBS | RESPIRATION RATE: 16 BRPM

## 2023-06-21 DIAGNOSIS — E78.00 HYPERCHOLESTEREMIA: ICD-10-CM

## 2023-06-21 DIAGNOSIS — E11.8 TYPE 2 DIABETES MELLITUS WITH COMPLICATION, WITHOUT LONG-TERM CURRENT USE OF INSULIN (HCC): Primary | ICD-10-CM

## 2023-06-21 DIAGNOSIS — I10 PRIMARY HYPERTENSION: ICD-10-CM

## 2023-06-21 PROCEDURE — 3074F SYST BP LT 130 MM HG: CPT | Performed by: NURSE PRACTITIONER

## 2023-06-21 PROCEDURE — G8417 CALC BMI ABV UP PARAM F/U: HCPCS | Performed by: NURSE PRACTITIONER

## 2023-06-21 PROCEDURE — 1090F PRES/ABSN URINE INCON ASSESS: CPT | Performed by: NURSE PRACTITIONER

## 2023-06-21 PROCEDURE — 2022F DILAT RTA XM EVC RTNOPTHY: CPT | Performed by: NURSE PRACTITIONER

## 2023-06-21 PROCEDURE — G8400 PT W/DXA NO RESULTS DOC: HCPCS | Performed by: NURSE PRACTITIONER

## 2023-06-21 PROCEDURE — 1036F TOBACCO NON-USER: CPT | Performed by: NURSE PRACTITIONER

## 2023-06-21 PROCEDURE — 3017F COLORECTAL CA SCREEN DOC REV: CPT | Performed by: NURSE PRACTITIONER

## 2023-06-21 PROCEDURE — 3046F HEMOGLOBIN A1C LEVEL >9.0%: CPT | Performed by: NURSE PRACTITIONER

## 2023-06-21 PROCEDURE — G8427 DOCREV CUR MEDS BY ELIG CLIN: HCPCS | Performed by: NURSE PRACTITIONER

## 2023-06-21 PROCEDURE — 99214 OFFICE O/P EST MOD 30 MIN: CPT | Performed by: NURSE PRACTITIONER

## 2023-06-21 PROCEDURE — 1123F ACP DISCUSS/DSCN MKR DOCD: CPT | Performed by: NURSE PRACTITIONER

## 2023-06-21 PROCEDURE — 3078F DIAST BP <80 MM HG: CPT | Performed by: NURSE PRACTITIONER

## 2023-06-21 RX ORDER — METFORMIN HYDROCHLORIDE 500 MG/1
1000 TABLET, EXTENDED RELEASE ORAL 2 TIMES DAILY
Qty: 120 TABLET | Refills: 5 | Status: SHIPPED | OUTPATIENT
Start: 2023-06-21

## 2023-06-21 RX ORDER — ROSUVASTATIN CALCIUM 10 MG/1
10 TABLET, COATED ORAL DAILY
COMMUNITY
Start: 2023-05-04

## 2023-06-21 RX ORDER — ISOSORBIDE MONONITRATE 30 MG/1
1 TABLET, EXTENDED RELEASE ORAL DAILY
COMMUNITY
Start: 2023-05-04

## 2023-06-21 RX ORDER — METOPROLOL SUCCINATE 25 MG/1
25 TABLET, EXTENDED RELEASE ORAL DAILY
Qty: 30 TABLET | Refills: 3 | COMMUNITY
Start: 2023-06-21

## 2023-06-21 RX ORDER — NITROGLYCERIN 0.4 MG/1
0.4 TABLET SUBLINGUAL EVERY 5 MIN PRN
Qty: 25 TABLET | Refills: 1 | Status: CANCELLED | OUTPATIENT
Start: 2023-06-21

## 2023-06-21 RX ORDER — GLIPIZIDE 5 MG/1
10 TABLET, FILM COATED, EXTENDED RELEASE ORAL 2 TIMES DAILY
Qty: 120 TABLET | Refills: 2 | Status: SHIPPED | OUTPATIENT
Start: 2023-06-21

## 2023-06-21 SDOH — ECONOMIC STABILITY: FOOD INSECURITY: WITHIN THE PAST 12 MONTHS, YOU WORRIED THAT YOUR FOOD WOULD RUN OUT BEFORE YOU GOT MONEY TO BUY MORE.: NEVER TRUE

## 2023-06-21 SDOH — ECONOMIC STABILITY: HOUSING INSECURITY
IN THE LAST 12 MONTHS, WAS THERE A TIME WHEN YOU DID NOT HAVE A STEADY PLACE TO SLEEP OR SLEPT IN A SHELTER (INCLUDING NOW)?: NO

## 2023-06-21 SDOH — ECONOMIC STABILITY: FOOD INSECURITY: WITHIN THE PAST 12 MONTHS, THE FOOD YOU BOUGHT JUST DIDN'T LAST AND YOU DIDN'T HAVE MONEY TO GET MORE.: NEVER TRUE

## 2023-06-21 SDOH — ECONOMIC STABILITY: INCOME INSECURITY: HOW HARD IS IT FOR YOU TO PAY FOR THE VERY BASICS LIKE FOOD, HOUSING, MEDICAL CARE, AND HEATING?: NOT HARD AT ALL

## 2023-06-27 ASSESSMENT — ENCOUNTER SYMPTOMS
CHEST TIGHTNESS: 0
VISUAL CHANGE: 0
BLURRED VISION: 0
TROUBLE SWALLOWING: 0
COUGH: 0
ABDOMINAL PAIN: 0
WHEEZING: 0
SHORTNESS OF BREATH: 0

## 2023-07-18 ENCOUNTER — TELEPHONE (OUTPATIENT)
Dept: FAMILY MEDICINE CLINIC | Age: 68
End: 2023-07-18

## 2023-07-31 ENCOUNTER — OFFICE VISIT (OUTPATIENT)
Dept: PRIMARY CARE CLINIC | Age: 68
End: 2023-07-31
Payer: MEDICARE

## 2023-07-31 ENCOUNTER — TELEPHONE (OUTPATIENT)
Dept: PRIMARY CARE CLINIC | Age: 68
End: 2023-07-31

## 2023-07-31 VITALS
HEIGHT: 60 IN | WEIGHT: 162.6 LBS | SYSTOLIC BLOOD PRESSURE: 118 MMHG | DIASTOLIC BLOOD PRESSURE: 74 MMHG | HEART RATE: 77 BPM | OXYGEN SATURATION: 97 % | BODY MASS INDEX: 31.92 KG/M2

## 2023-07-31 DIAGNOSIS — K44.9 HIATAL HERNIA: Primary | ICD-10-CM

## 2023-07-31 DIAGNOSIS — E11.65 TYPE 2 DIABETES MELLITUS WITH HYPERGLYCEMIA, WITHOUT LONG-TERM CURRENT USE OF INSULIN (HCC): Primary | ICD-10-CM

## 2023-07-31 PROCEDURE — 3017F COLORECTAL CA SCREEN DOC REV: CPT | Performed by: STUDENT IN AN ORGANIZED HEALTH CARE EDUCATION/TRAINING PROGRAM

## 2023-07-31 PROCEDURE — G8400 PT W/DXA NO RESULTS DOC: HCPCS | Performed by: STUDENT IN AN ORGANIZED HEALTH CARE EDUCATION/TRAINING PROGRAM

## 2023-07-31 PROCEDURE — 1036F TOBACCO NON-USER: CPT | Performed by: STUDENT IN AN ORGANIZED HEALTH CARE EDUCATION/TRAINING PROGRAM

## 2023-07-31 PROCEDURE — 3074F SYST BP LT 130 MM HG: CPT | Performed by: STUDENT IN AN ORGANIZED HEALTH CARE EDUCATION/TRAINING PROGRAM

## 2023-07-31 PROCEDURE — G8427 DOCREV CUR MEDS BY ELIG CLIN: HCPCS | Performed by: STUDENT IN AN ORGANIZED HEALTH CARE EDUCATION/TRAINING PROGRAM

## 2023-07-31 PROCEDURE — 1090F PRES/ABSN URINE INCON ASSESS: CPT | Performed by: STUDENT IN AN ORGANIZED HEALTH CARE EDUCATION/TRAINING PROGRAM

## 2023-07-31 PROCEDURE — 3078F DIAST BP <80 MM HG: CPT | Performed by: STUDENT IN AN ORGANIZED HEALTH CARE EDUCATION/TRAINING PROGRAM

## 2023-07-31 PROCEDURE — 99204 OFFICE O/P NEW MOD 45 MIN: CPT | Performed by: STUDENT IN AN ORGANIZED HEALTH CARE EDUCATION/TRAINING PROGRAM

## 2023-07-31 PROCEDURE — G8417 CALC BMI ABV UP PARAM F/U: HCPCS | Performed by: STUDENT IN AN ORGANIZED HEALTH CARE EDUCATION/TRAINING PROGRAM

## 2023-07-31 PROCEDURE — 1123F ACP DISCUSS/DSCN MKR DOCD: CPT | Performed by: STUDENT IN AN ORGANIZED HEALTH CARE EDUCATION/TRAINING PROGRAM

## 2023-07-31 RX ORDER — DICYCLOMINE HCL 20 MG
20 TABLET ORAL 4 TIMES DAILY PRN
COMMUNITY
Start: 2023-07-30 | End: 2023-08-06

## 2023-07-31 RX ORDER — FAMOTIDINE 20 MG/1
20 TABLET, FILM COATED ORAL 2 TIMES DAILY
Qty: 28 TABLET | Refills: 0 | COMMUNITY
Start: 2023-07-30 | End: 2023-08-13

## 2023-07-31 NOTE — TELEPHONE ENCOUNTER
Pt stated she had to cancel her apt tomorrow. She would like for you to order her A1C blood work and she will go to the lab to have this done. auscultated w/ stethoscope

## 2023-07-31 NOTE — PROGRESS NOTES
7/31/2023        Katey Arevalo 1955 is a 78 y.o. female who presents today with:  Chief Complaint   Patient presents with    Abdominal Pain     ER [facility] clinic 07/30/2023       HPI:   Henry Curry presents today for ED follow up: \"Patient is a 76-year-old female with past medical history diabetes, hyperlipidemia, hypertension, CAD with stent, and kidney stones who presents with 1 week of upper abdominal pain that she describes as a pressure sensation as well as some diarrhea. She states she has upper abdominal and lower chest pain that is worse with eating. No radiation of the pain. She describes nausea without vomiting. No recent sick contacts. She overall appears well and nontoxic. She has no other complaints. \"    She states today she is doing better but still has stomach cramping, bloating, and pressure. She would like to a referral to GI for hiatal hernia treatment      Assessment & Plan   1. Hiatal hernia  -     Ambulatory referral to Gastroenterology     There are no discontinued medications. No follow-ups on file.         Objective  No Known Allergies  Current Outpatient Medications   Medication Sig Dispense Refill    dicyclomine (BENTYL) 20 MG tablet Take 1 tablet by mouth 4 times daily as needed      famotidine (PEPCID) 20 MG tablet Take 1 tablet by mouth 2 times daily 28 tablet 0    isosorbide mononitrate (IMDUR) 30 MG extended release tablet Take 1 tablet by mouth daily      rosuvastatin (CRESTOR) 10 MG tablet Take 1 tablet by mouth daily      metFORMIN (GLUCOPHAGE-XR) 500 MG extended release tablet Take 2 tablets by mouth 2 times daily 120 tablet 5    glipiZIDE (GLUCOTROL XL) 5 MG extended release tablet Take 2 tablets by mouth 2 times daily 120 tablet 2    metoprolol succinate (TOPROL XL) 25 MG extended release tablet Take 1 tablet by mouth daily 30 tablet 3    clopidogrel (PLAVIX) 75 MG tablet       nitroGLYCERIN (NITROSTAT) 0.4 MG SL tablet Place 1 tablet under the tongue every 5 minutes as

## 2023-08-07 RX ORDER — DILTIAZEM HYDROCHLORIDE 240 MG/1
CAPSULE, EXTENDED RELEASE ORAL
Qty: 30 CAPSULE | Refills: 0 | Status: SHIPPED | OUTPATIENT
Start: 2023-08-07

## 2023-08-07 RX ORDER — CELECOXIB 100 MG/1
CAPSULE ORAL
Qty: 60 CAPSULE | Refills: 0 | Status: SHIPPED | OUTPATIENT
Start: 2023-08-07

## 2023-08-18 DIAGNOSIS — E11.65 TYPE 2 DIABETES MELLITUS WITH HYPERGLYCEMIA, WITHOUT LONG-TERM CURRENT USE OF INSULIN (HCC): ICD-10-CM

## 2023-08-18 LAB
CHOLEST SERPL-MCNC: 231 MG/DL (ref 0–199)
HBA1C MFR BLD: 10 % (ref 4.8–5.9)
HDLC SERPL-MCNC: 65 MG/DL (ref 40–59)
LDLC SERPL CALC-MCNC: 140 MG/DL (ref 0–129)
TRIGL SERPL-MCNC: 132 MG/DL (ref 0–150)

## 2023-08-29 ENCOUNTER — OFFICE VISIT (OUTPATIENT)
Dept: GASTROENTEROLOGY | Age: 68
End: 2023-08-29
Payer: MEDICARE

## 2023-08-29 ENCOUNTER — PREP FOR PROCEDURE (OUTPATIENT)
Dept: GASTROENTEROLOGY | Age: 68
End: 2023-08-29

## 2023-08-29 VITALS
HEART RATE: 76 BPM | OXYGEN SATURATION: 98 % | DIASTOLIC BLOOD PRESSURE: 72 MMHG | WEIGHT: 168 LBS | BODY MASS INDEX: 32.81 KG/M2 | SYSTOLIC BLOOD PRESSURE: 120 MMHG

## 2023-08-29 DIAGNOSIS — R10.13 EPIGASTRIC PAIN: Primary | ICD-10-CM

## 2023-08-29 PROCEDURE — 99204 OFFICE O/P NEW MOD 45 MIN: CPT | Performed by: INTERNAL MEDICINE

## 2023-08-29 PROCEDURE — G8417 CALC BMI ABV UP PARAM F/U: HCPCS | Performed by: INTERNAL MEDICINE

## 2023-08-29 PROCEDURE — 3078F DIAST BP <80 MM HG: CPT | Performed by: INTERNAL MEDICINE

## 2023-08-29 PROCEDURE — 3074F SYST BP LT 130 MM HG: CPT | Performed by: INTERNAL MEDICINE

## 2023-08-29 PROCEDURE — 1123F ACP DISCUSS/DSCN MKR DOCD: CPT | Performed by: INTERNAL MEDICINE

## 2023-08-29 PROCEDURE — 1036F TOBACCO NON-USER: CPT | Performed by: INTERNAL MEDICINE

## 2023-08-29 PROCEDURE — G8427 DOCREV CUR MEDS BY ELIG CLIN: HCPCS | Performed by: INTERNAL MEDICINE

## 2023-08-29 PROCEDURE — 3017F COLORECTAL CA SCREEN DOC REV: CPT | Performed by: INTERNAL MEDICINE

## 2023-08-29 PROCEDURE — 1090F PRES/ABSN URINE INCON ASSESS: CPT | Performed by: INTERNAL MEDICINE

## 2023-08-29 PROCEDURE — G8400 PT W/DXA NO RESULTS DOC: HCPCS | Performed by: INTERNAL MEDICINE

## 2023-08-29 RX ORDER — PANTOPRAZOLE SODIUM 40 MG/1
40 TABLET, DELAYED RELEASE ORAL DAILY
Qty: 30 TABLET | Refills: 2 | Status: SHIPPED | OUTPATIENT
Start: 2023-08-29

## 2023-08-29 NOTE — PROGRESS NOTES
Mesentery/Peritoneum: No ascites or mass. Retroperitoneum: No mass. Vasculature:   - Abdominal aorta and iliac arteries: Atherosclerotic calcifications   without aneurysm. - Celiac and SMA: Patent without stenosis. - Portal venous system (SMV, splenic vein, portal vein and branches):   Patent.    - Hepatic veins: Patent. Pelvis: No mass, ascites or fluid collection. Bones/Soft Tissues: Degenerative changes of the lumbar and included   thoracic spine. Grade 1 L4 on L5 spondylolisthesis. Fat-containing   umbilical hernia. Lower thorax: No abnormality in the imaged lower thorax.  (topogram) images: No additional findings. IMPRESSION   IMPRESSION:     No acute findings in the abdomen or pelvis. No significant change since   the comparison study. Diverticulosis. Cholecystectomy. Assessment:      Assessment and plan:    1. Epigastric pain/ GERD  Seems improving at this time  Patient was on Celebrex and was initiated on PPI. Had recent CT scan that was negative for  EGD requested to assess for any mucosal etiology. Assess the presence of hiatal hernia. The upper endoscopy procedure, complications, risk and benefit were reviewed. Patient would like to proceed accordingly. Upper endoscopy while on clopidogrel  2- Colon cancer screening  No prior colonoscopy reported. Discussed with patient proceeding with screening colonoscopy. Patient is reluctant to proceed  though she may consider after addressing the upper GI symptoms   Denies melena or hematochezia. No diarrhea or constipation. No weight loss  3-Associated medical conditions include but not limited to history of diabetes mellitus, hypertension, dyslipidemia, coronary disease with prior stent, nephrolithiasis, diverticulosis on recent imaging. Molly Mahoney MD      Return in about 4 weeks (around 9/26/2023) for further management, Post procedure results discussion.       Molly Mahoney

## 2023-08-31 ASSESSMENT — ENCOUNTER SYMPTOMS
ABDOMINAL DISTENTION: 0
EYE REDNESS: 0
CONSTIPATION: 0
CHEST TIGHTNESS: 0
DIARRHEA: 0
VOICE CHANGE: 0
EYE PAIN: 0
BLOOD IN STOOL: 0
SHORTNESS OF BREATH: 0
VOMITING: 0
ABDOMINAL PAIN: 1
PHOTOPHOBIA: 0
COLOR CHANGE: 0
NAUSEA: 0
TROUBLE SWALLOWING: 0
WHEEZING: 0
RECTAL PAIN: 0

## 2023-09-05 RX ORDER — DILTIAZEM HYDROCHLORIDE 240 MG/1
CAPSULE, EXTENDED RELEASE ORAL
Qty: 30 CAPSULE | Refills: 5 | Status: SHIPPED | OUTPATIENT
Start: 2023-09-05

## 2023-09-05 RX ORDER — CELECOXIB 100 MG/1
CAPSULE ORAL
Qty: 60 CAPSULE | Refills: 5 | Status: SHIPPED | OUTPATIENT
Start: 2023-09-05

## 2023-09-15 ENCOUNTER — TELEPHONE (OUTPATIENT)
Dept: PHARMACY | Facility: CLINIC | Age: 68
End: 2023-09-15

## 2023-09-15 NOTE — TELEPHONE ENCOUNTER
[facility] CLINICAL PHARMACY: ADHERENCE REVIEW  Identified care gap per [place] Per insurer report, LIS-0 - co-pays are based on tiers and patient is subject to coverage gap.    fills with [facility] [address] [facility]: Diabetes and Statin adherence    Patient also appears to be prescribed:No Others in the metric at this time      [address] [place] [address] Records claims through 09/10/2023 (Prior Year [address] = not reported; YTD [address] = 100%; Potential Fail Date: 12.12.23):   Metformin er last filled on 09.05.23 for 30 day supply. Next refill due: 10.05.23  Glipizide er last filled on 07.02.23 for 30 day supply. Next refill due: 08.01.23    Per Insurer Portal: last filled on (same as above). .    Lab Results   Component Value Date    LABA1C 10.0 (H) 08/18/2023    LABA1C 10.5 (H) 05/02/2023    LABA1C 10.3 08/01/2022     NOTE: A1c >9%     STATIN ADHERENCE    Insurance Records claims through 09/10/2023 (Prior Year PDC = not reported; YTD [address] = FIRST FILL; Potential Fail Date: 09.19.23):   Rosuvastatin last filled on 05.04.23 for 90 day supply. Next refill due: 08.02.23    Per Insurer Portal: last filled on (same as above). .    Lab Results   Component Value Date    CHOL 231 (H) 08/18/2023    TRIG 132 08/18/2023    HDL 65 (H) 08/18/2023    LDLCALC 140 (H) 08/18/2023     ALT   Date Value Ref Range Status   05/02/2023 15 0 - 33 U/L Final     AST   Date Value Ref Range Status   05/02/2023 21 0 - 35 U/L Final     The ASCVD Risk score (Lauren REYNOLDS, et al., 2019) failed to calculate for the following reasons:    Unable to determine if patient is Non-         PLAN  The following are interventions that have been identified:   Patient overdue refilling glipizide er & rosuvastatin and active on home medication list.   eligible for 90 day supply for $15 copay (per [employer])    Outreach:  Attempting to reach patient to review. Left message asking for return call.

## 2023-09-18 NOTE — TELEPHONE ENCOUNTER
CLINICAL PHARMACY: ADHERENCE REVIEW    2nd Attempt Documentation:   Attempting to reach patient to review; left message asking for return call.  and letter mailed to home address    [facility] [facility] has refills available for  of both Glipizide ER & Rosuvastatin.      ===================================================================    For Pharmacy Admin Tracking Only    Program: Harmony in place:  No  Gap Closed?: No   Time Spent (min): 15

## 2023-10-10 ENCOUNTER — TELEPHONE (OUTPATIENT)
Dept: OBGYN CLINIC | Age: 68
End: 2023-10-10

## 2023-10-10 PROBLEM — R06.02 SHORTNESS OF BREATH ON EXERTION: Status: ACTIVE | Noted: 2023-10-10

## 2023-10-10 PROBLEM — I20.89 EFFORT ANGINA (CMS-HCC): Status: ACTIVE | Noted: 2023-10-10

## 2023-10-10 PROBLEM — I83.11 VARICOSE VEINS OF RIGHT LOWER EXTREMITY WITH INFLAMMATION: Status: ACTIVE | Noted: 2023-10-10

## 2023-10-10 PROBLEM — R00.2 PALPITATIONS: Status: ACTIVE | Noted: 2023-10-10

## 2023-10-10 PROBLEM — M23.91 INTERNAL DERANGEMENT OF RIGHT KNEE: Status: ACTIVE | Noted: 2023-10-10

## 2023-10-10 PROBLEM — R11.0 NAUSEA IN ADULT: Status: ACTIVE | Noted: 2023-10-10

## 2023-10-10 PROBLEM — Z95.820 STATUS POST ANGIOPLASTY WITH STENT: Status: ACTIVE | Noted: 2023-10-10

## 2023-10-10 PROBLEM — I25.119 ATHEROSCLEROSIS OF NATIVE CORONARY ARTERY OF NATIVE HEART WITH ANGINA PECTORIS (CMS-HCC): Status: ACTIVE | Noted: 2023-10-10

## 2023-10-10 PROBLEM — M25.561 KNEE PAIN, RIGHT: Status: ACTIVE | Noted: 2023-10-10

## 2023-10-10 PROBLEM — R01.1 SYSTOLIC MURMUR: Status: ACTIVE | Noted: 2023-10-10

## 2023-10-10 PROBLEM — I20.9 ANGINA PECTORIS (CMS-HCC): Status: ACTIVE | Noted: 2023-10-10

## 2023-10-10 RX ORDER — METFORMIN HYDROCHLORIDE 500 MG/1
500 TABLET ORAL 4 TIMES DAILY
COMMUNITY
End: 2024-02-05 | Stop reason: SDUPTHER

## 2023-10-10 RX ORDER — NITROGLYCERIN 0.4 MG/1
TABLET SUBLINGUAL
COMMUNITY
Start: 2022-03-28

## 2023-10-10 RX ORDER — CLOPIDOGREL BISULFATE 75 MG/1
1 TABLET ORAL DAILY
COMMUNITY
End: 2024-02-05 | Stop reason: SDUPTHER

## 2023-10-10 RX ORDER — METOPROLOL SUCCINATE 25 MG/1
1 TABLET, EXTENDED RELEASE ORAL DAILY
COMMUNITY
Start: 2022-03-28 | End: 2024-03-04

## 2023-10-10 RX ORDER — CELECOXIB 100 MG/1
1 CAPSULE ORAL 2 TIMES DAILY
COMMUNITY
Start: 2022-03-10

## 2023-10-10 RX ORDER — PANTOPRAZOLE SODIUM 40 MG/1
1 TABLET, DELAYED RELEASE ORAL DAILY
COMMUNITY
Start: 2022-04-19

## 2023-10-10 RX ORDER — GLIPIZIDE 5 MG/1
1 TABLET, FILM COATED, EXTENDED RELEASE ORAL 2 TIMES DAILY
COMMUNITY
Start: 2021-04-19

## 2023-10-10 RX ORDER — DEXTROMETHORPHAN HYDROBROMIDE, GUAIFENESIN 5; 100 MG/5ML; MG/5ML
650 LIQUID ORAL EVERY 8 HOURS PRN
COMMUNITY

## 2023-10-10 RX ORDER — DILTIAZEM HYDROCHLORIDE 240 MG/1
CAPSULE, EXTENDED RELEASE ORAL
COMMUNITY
Start: 2021-11-19 | End: 2023-10-13 | Stop reason: SDUPTHER

## 2023-10-10 RX ORDER — ASPIRIN 81 MG/1
1 TABLET ORAL DAILY
COMMUNITY
Start: 2022-03-28

## 2023-10-10 RX ORDER — ISOSORBIDE MONONITRATE 30 MG/1
30 TABLET, EXTENDED RELEASE ORAL DAILY
COMMUNITY

## 2023-10-10 RX ORDER — ROSUVASTATIN CALCIUM 5 MG/1
1 TABLET, COATED ORAL DAILY
COMMUNITY
Start: 2022-03-28

## 2023-10-13 DIAGNOSIS — I20.9 ANGINA PECTORIS (CMS-HCC): ICD-10-CM

## 2023-10-13 RX ORDER — DILTIAZEM HYDROCHLORIDE 240 MG/1
240 CAPSULE, EXTENDED RELEASE ORAL DAILY
Qty: 90 CAPSULE | Refills: 3 | Status: SHIPPED | OUTPATIENT
Start: 2023-10-13

## 2023-10-16 ENCOUNTER — TELEPHONE (OUTPATIENT)
Dept: FAMILY MEDICINE CLINIC | Age: 68
End: 2023-10-16

## 2023-10-30 ENCOUNTER — TELEPHONE (OUTPATIENT)
Dept: FAMILY MEDICINE CLINIC | Age: 68
End: 2023-10-30

## 2023-11-13 ENCOUNTER — PREP FOR PROCEDURE (OUTPATIENT)
Dept: GASTROENTEROLOGY | Age: 68
End: 2023-11-13

## 2023-11-13 ENCOUNTER — TELEPHONE (OUTPATIENT)
Dept: FAMILY MEDICINE CLINIC | Age: 68
End: 2023-11-13

## 2023-11-14 RX ORDER — SODIUM CHLORIDE 0.9 % (FLUSH) 0.9 %
5-40 SYRINGE (ML) INJECTION EVERY 12 HOURS SCHEDULED
Status: CANCELLED | OUTPATIENT
Start: 2023-11-14

## 2023-11-14 RX ORDER — SODIUM CHLORIDE 9 MG/ML
INJECTION, SOLUTION INTRAVENOUS CONTINUOUS
Status: CANCELLED | OUTPATIENT
Start: 2023-11-14

## 2023-11-14 RX ORDER — SODIUM CHLORIDE 9 MG/ML
INJECTION, SOLUTION INTRAVENOUS PRN
Status: CANCELLED | OUTPATIENT
Start: 2023-11-14

## 2023-11-15 ENCOUNTER — HOSPITAL ENCOUNTER (OUTPATIENT)
Age: 68
Setting detail: OUTPATIENT SURGERY
Discharge: HOME | End: 2023-11-15
Attending: INTERNAL MEDICINE | Admitting: INTERNAL MEDICINE
Payer: MEDICARE

## 2023-11-15 ENCOUNTER — ANESTHESIA EVENT (OUTPATIENT)
Dept: ENDOSCOPY | Age: 68
End: 2023-11-15
Payer: MEDICARE

## 2023-11-15 ENCOUNTER — ANESTHESIA (OUTPATIENT)
Dept: ENDOSCOPY | Age: 68
End: 2023-11-15
Payer: MEDICARE

## 2023-11-15 ENCOUNTER — TELEPHONE (OUTPATIENT)
Dept: FAMILY MEDICINE CLINIC | Age: 68
End: 2023-11-15

## 2023-11-15 VITALS
HEART RATE: 85 BPM | RESPIRATION RATE: 18 BRPM | DIASTOLIC BLOOD PRESSURE: 79 MMHG | SYSTOLIC BLOOD PRESSURE: 135 MMHG | TEMPERATURE: 97.6 F | WEIGHT: 161 LBS | BODY MASS INDEX: 31.61 KG/M2 | OXYGEN SATURATION: 96 % | HEIGHT: 60 IN

## 2023-11-15 DIAGNOSIS — R10.13 ABDOMINAL PAIN, EPIGASTRIC: ICD-10-CM

## 2023-11-15 LAB
GLUCOSE BLD-MCNC: 266 MG/DL (ref 70–99)
PERFORMED ON: ABNORMAL

## 2023-11-15 PROCEDURE — 2709999900 HC NON-CHARGEABLE SUPPLY: Performed by: INTERNAL MEDICINE

## 2023-11-15 PROCEDURE — 2580000003 HC RX 258: Performed by: INTERNAL MEDICINE

## 2023-11-15 PROCEDURE — 3609017100 HC EGD: Performed by: INTERNAL MEDICINE

## 2023-11-15 PROCEDURE — 3700000000 HC ANESTHESIA ATTENDED CARE: Performed by: INTERNAL MEDICINE

## 2023-11-15 PROCEDURE — 88342 IMHCHEM/IMCYTCHM 1ST ANTB: CPT

## 2023-11-15 PROCEDURE — 6370000000 HC RX 637 (ALT 250 FOR IP): Performed by: INTERNAL MEDICINE

## 2023-11-15 PROCEDURE — 7100000011 HC PHASE II RECOVERY - ADDTL 15 MIN: Performed by: INTERNAL MEDICINE

## 2023-11-15 PROCEDURE — 43239 EGD BIOPSY SINGLE/MULTIPLE: CPT | Performed by: INTERNAL MEDICINE

## 2023-11-15 PROCEDURE — 7100000010 HC PHASE II RECOVERY - FIRST 15 MIN: Performed by: INTERNAL MEDICINE

## 2023-11-15 PROCEDURE — 88305 TISSUE EXAM BY PATHOLOGIST: CPT

## 2023-11-15 PROCEDURE — 6360000002 HC RX W HCPCS: Performed by: NURSE ANESTHETIST, CERTIFIED REGISTERED

## 2023-11-15 RX ORDER — SODIUM CHLORIDE 9 MG/ML
INJECTION, SOLUTION INTRAVENOUS CONTINUOUS
Status: DISCONTINUED | OUTPATIENT
Start: 2023-11-15 | End: 2023-11-15 | Stop reason: HOSPADM

## 2023-11-15 RX ORDER — SODIUM CHLORIDE 9 MG/ML
INJECTION, SOLUTION INTRAVENOUS PRN
Status: DISCONTINUED | OUTPATIENT
Start: 2023-11-15 | End: 2023-11-15 | Stop reason: HOSPADM

## 2023-11-15 RX ORDER — SODIUM CHLORIDE 0.9 % (FLUSH) 0.9 %
5-40 SYRINGE (ML) INJECTION EVERY 12 HOURS SCHEDULED
Status: DISCONTINUED | OUTPATIENT
Start: 2023-11-15 | End: 2023-11-15 | Stop reason: HOSPADM

## 2023-11-15 RX ORDER — MAGNESIUM HYDROXIDE 1200 MG/15ML
LIQUID ORAL PRN
Status: DISCONTINUED | OUTPATIENT
Start: 2023-11-15 | End: 2023-11-15 | Stop reason: ALTCHOICE

## 2023-11-15 RX ORDER — ONDANSETRON 2 MG/ML
4 INJECTION INTRAMUSCULAR; INTRAVENOUS
Status: DISCONTINUED | OUTPATIENT
Start: 2023-11-15 | End: 2023-11-15 | Stop reason: HOSPADM

## 2023-11-15 RX ORDER — PROPOFOL 10 MG/ML
INJECTION, EMULSION INTRAVENOUS PRN
Status: DISCONTINUED | OUTPATIENT
Start: 2023-11-15 | End: 2023-11-15 | Stop reason: SDUPTHER

## 2023-11-15 RX ORDER — SODIUM CHLORIDE 9 MG/ML
INJECTION, SOLUTION INTRAVENOUS
Status: COMPLETED
Start: 2023-11-15 | End: 2023-11-15

## 2023-11-15 RX ORDER — SIMETHICONE 20 MG/.3ML
EMULSION ORAL PRN
Status: DISCONTINUED | OUTPATIENT
Start: 2023-11-15 | End: 2023-11-15 | Stop reason: ALTCHOICE

## 2023-11-15 RX ORDER — SODIUM CHLORIDE 0.9 % (FLUSH) 0.9 %
5-40 SYRINGE (ML) INJECTION PRN
Status: DISCONTINUED | OUTPATIENT
Start: 2023-11-15 | End: 2023-11-15 | Stop reason: HOSPADM

## 2023-11-15 RX ADMIN — SODIUM CHLORIDE: 9 INJECTION, SOLUTION INTRAVENOUS at 10:30

## 2023-11-15 RX ADMIN — PROPOFOL 200 MG: 10 INJECTION, EMULSION INTRAVENOUS at 10:36

## 2023-11-15 ASSESSMENT — PAIN - FUNCTIONAL ASSESSMENT: PAIN_FUNCTIONAL_ASSESSMENT: 0-10

## 2023-11-15 ASSESSMENT — PAIN SCALES - GENERAL
PAINLEVEL_OUTOF10: 0

## 2023-11-15 NOTE — ANESTHESIA POSTPROCEDURE EVALUATION
Department of Anesthesiology  Postprocedure Note    Patient: Umang Nash  MRN: [id number]  YOB: 1955  Date of evaluation: 11/15/2023      Procedure Summary     Date: 11/15/23 Room / Location: [address] [place] [address]    Anesthesia Start: 1033 Anesthesia Stop: 1042    Procedure: EGD DIAGNOSTIC ONLY with biopsies Diagnosis:       Abdominal pain, epigastric      (Abdominal pain, epigastric [R10.13])    Surgeons: Ashlyn Prasad MD Responsible Provider: EJ Denis CRNA    Anesthesia Type: MAC ASA Status: 3          Anesthesia Type: No value filed.     Morenita Phase I: Morenita Score: 10    Morenita Phase II:        Anesthesia Post Evaluation    Patient location during evaluation: bedside  Patient participation: complete - patient participated  Level of consciousness: awake and awake and alert  Airway patency: patent  Nausea & Vomiting: no nausea and no vomiting  Complications: no  Cardiovascular status: blood pressure returned to baseline and hemodynamically stable  Respiratory status: acceptable  Hydration status: euvolemic  Pain management: adequate

## 2023-11-15 NOTE — ANESTHESIA PRE PROCEDURE
Department of Anesthesiology  Preprocedure Note       Name:  Dillon Andrade   Age:  80 y.o.  :  1955                                          MRN:  [id number]         Date:  11/15/2023      Surgeon: Army Gan):  Martinez Farrell MD    Procedure: Procedure(s):  EGD DIAGNOSTIC ONLY    Medications prior to admission:   Prior to Admission medications    Medication Sig Start Date End Date Taking?  Authorizing Provider   celecoxib (CELEBREX) 100 MG capsule TAKE ONE CAPSULE BY MOUTH TWO TIMES A DAY 23   Tess Bernal MD   dilTIAZem (TIADYLT ER) 240 MG extended release capsule TAKE ONE CAPSULE BY MOUTH EVERY DAY 23   Tess Bernal MD   pantoprazole (PROTONIX) 40 MG tablet Take 1 tablet by mouth daily 23   Martinez Farrell MD   dicyclomine (BENTYL) 20 MG tablet Take 1 tablet by mouth 4 times daily as needed 23  Sandy Monroy MD   famotidine (PEPCID) 20 MG tablet Take 1 tablet by mouth 2 times daily 23  ProviderSandy MD   isosorbide mononitrate (IMDUR) 30 MG extended release tablet Take 1 tablet by mouth daily 23   Sandy Monroy MD   rosuvastatin (CRESTOR) 10 MG tablet Take 1 tablet by mouth daily 23   ProviderSandy MD   metFORMIN (GLUCOPHAGE-XR) 500 MG extended release tablet Take 2 tablets by mouth 2 times daily 23   EJ Kwan CNP   glipiZIDE (GLUCOTROL XL) 5 MG extended release tablet Take 2 tablets by mouth 2 times daily 23   EJ Kwan CNP   metoprolol succinate (TOPROL XL) 25 MG extended release tablet Take 1 tablet by mouth daily 23   EJ Kwan CNP   clopidogrel (PLAVIX) 75 MG tablet  23   ProviderSandy MD   nitroGLYCERIN (NITROSTAT) 0.4 MG SL tablet Place 1 tablet under the tongue every 5 minutes as needed for Chest pain 23   Monica Welch APRN - CNP   FreeStyle Lancets MISC USE TO TEST ONCE TO TWICE DAILY AND AS NEEDED FOR SYMPTOMS OF IRREGULAR BLOOD

## 2023-12-06 ENCOUNTER — OFFICE VISIT (OUTPATIENT)
Dept: FAMILY MEDICINE CLINIC | Age: 68
End: 2023-12-06
Payer: MEDICARE

## 2023-12-06 VITALS
BODY MASS INDEX: 31.41 KG/M2 | TEMPERATURE: 97.5 F | DIASTOLIC BLOOD PRESSURE: 60 MMHG | WEIGHT: 160 LBS | HEART RATE: 84 BPM | OXYGEN SATURATION: 96 % | SYSTOLIC BLOOD PRESSURE: 112 MMHG | HEIGHT: 60 IN

## 2023-12-06 DIAGNOSIS — B37.31 CANDIDA VAGINITIS: Primary | ICD-10-CM

## 2023-12-06 DIAGNOSIS — R10.9 FLANK PAIN: ICD-10-CM

## 2023-12-06 DIAGNOSIS — R30.0 DYSURIA: ICD-10-CM

## 2023-12-06 PROCEDURE — 99213 OFFICE O/P EST LOW 20 MIN: CPT

## 2023-12-06 PROCEDURE — 3078F DIAST BP <80 MM HG: CPT

## 2023-12-06 PROCEDURE — 1090F PRES/ABSN URINE INCON ASSESS: CPT

## 2023-12-06 PROCEDURE — 1123F ACP DISCUSS/DSCN MKR DOCD: CPT

## 2023-12-06 PROCEDURE — G8400 PT W/DXA NO RESULTS DOC: HCPCS

## 2023-12-06 PROCEDURE — 3017F COLORECTAL CA SCREEN DOC REV: CPT

## 2023-12-06 PROCEDURE — G8417 CALC BMI ABV UP PARAM F/U: HCPCS

## 2023-12-06 PROCEDURE — G8427 DOCREV CUR MEDS BY ELIG CLIN: HCPCS

## 2023-12-06 PROCEDURE — 81003 URINALYSIS AUTO W/O SCOPE: CPT

## 2023-12-06 PROCEDURE — G8484 FLU IMMUNIZE NO ADMIN: HCPCS

## 2023-12-06 PROCEDURE — 3074F SYST BP LT 130 MM HG: CPT

## 2023-12-06 PROCEDURE — 1036F TOBACCO NON-USER: CPT

## 2023-12-06 RX ORDER — MICONAZOLE NITRATE 40 MG/G
1 CREAM VAGINAL NIGHTLY
Qty: 3 EACH | Refills: 0 | Status: SHIPPED | OUTPATIENT
Start: 2023-12-06 | End: 2023-12-09

## 2023-12-06 RX ORDER — SULFAMETHOXAZOLE AND TRIMETHOPRIM 800; 160 MG/1; MG/1
1 TABLET ORAL 2 TIMES DAILY
Qty: 14 TABLET | Refills: 0 | Status: SHIPPED | OUTPATIENT
Start: 2023-12-06 | End: 2023-12-13

## 2023-12-06 ASSESSMENT — ENCOUNTER SYMPTOMS
DIARRHEA: 1
VOMITING: 0
NAUSEA: 1
ABDOMINAL PAIN: 1

## 2023-12-08 LAB — BACTERIA UR CULT: NORMAL

## 2023-12-26 DIAGNOSIS — E11.8 TYPE 2 DIABETES MELLITUS WITH COMPLICATION, WITHOUT LONG-TERM CURRENT USE OF INSULIN (HCC): ICD-10-CM

## 2023-12-26 RX ORDER — GLIPIZIDE 5 MG/1
10 TABLET, FILM COATED, EXTENDED RELEASE ORAL 2 TIMES DAILY
Qty: 120 TABLET | Refills: 0 | Status: SHIPPED | OUTPATIENT
Start: 2023-12-26

## 2024-01-29 ENCOUNTER — HOSPITAL ENCOUNTER (OUTPATIENT)
Dept: WOMENS IMAGING | Age: 69
Discharge: HOME | End: 2024-01-31
Payer: MEDICARE

## 2024-01-29 DIAGNOSIS — Z12.31 ENCOUNTER FOR SCREENING MAMMOGRAM FOR MALIGNANT NEOPLASM OF BREAST: ICD-10-CM

## 2024-01-29 PROCEDURE — 77063 BREAST TOMOSYNTHESIS BI: CPT

## 2024-02-04 DIAGNOSIS — E11.8 TYPE 2 DIABETES MELLITUS WITH COMPLICATION, WITHOUT LONG-TERM CURRENT USE OF INSULIN (HCC): ICD-10-CM

## 2024-02-05 ENCOUNTER — OFFICE VISIT (OUTPATIENT)
Dept: CARDIOLOGY | Facility: CLINIC | Age: 69
End: 2024-02-05
Payer: MEDICARE

## 2024-02-05 VITALS
HEART RATE: 74 BPM | DIASTOLIC BLOOD PRESSURE: 76 MMHG | SYSTOLIC BLOOD PRESSURE: 132 MMHG | BODY MASS INDEX: 31.96 KG/M2 | HEIGHT: 60 IN | WEIGHT: 162.8 LBS

## 2024-02-05 DIAGNOSIS — Z95.820 STATUS POST ANGIOPLASTY WITH STENT: ICD-10-CM

## 2024-02-05 DIAGNOSIS — E78.2 MIXED HYPERLIPIDEMIA: ICD-10-CM

## 2024-02-05 DIAGNOSIS — I25.10 ATHEROSCLEROSIS OF NATIVE CORONARY ARTERY OF NATIVE HEART WITHOUT ANGINA PECTORIS: Primary | ICD-10-CM

## 2024-02-05 DIAGNOSIS — E11.9 DIABETES MELLITUS TYPE II, NON INSULIN DEPENDENT (MULTI): ICD-10-CM

## 2024-02-05 PROBLEM — I10 PRIMARY HYPERTENSION: Status: ACTIVE | Noted: 2024-02-05

## 2024-02-05 PROCEDURE — 99214 OFFICE O/P EST MOD 30 MIN: CPT | Performed by: INTERNAL MEDICINE

## 2024-02-05 PROCEDURE — 1125F AMNT PAIN NOTED PAIN PRSNT: CPT | Performed by: INTERNAL MEDICINE

## 2024-02-05 PROCEDURE — 1159F MED LIST DOCD IN RCRD: CPT | Performed by: INTERNAL MEDICINE

## 2024-02-05 PROCEDURE — 3075F SYST BP GE 130 - 139MM HG: CPT | Performed by: INTERNAL MEDICINE

## 2024-02-05 PROCEDURE — 3078F DIAST BP <80 MM HG: CPT | Performed by: INTERNAL MEDICINE

## 2024-02-05 PROCEDURE — 1036F TOBACCO NON-USER: CPT | Performed by: INTERNAL MEDICINE

## 2024-02-05 RX ORDER — GLIPIZIDE 5 MG/1
TABLET, FILM COATED, EXTENDED RELEASE ORAL
Qty: 120 TABLET | Refills: 0 | Status: SHIPPED | OUTPATIENT
Start: 2024-02-05

## 2024-02-05 RX ORDER — CLOPIDOGREL BISULFATE 75 MG/1
75 TABLET ORAL DAILY
Qty: 90 TABLET | Refills: 3 | Status: SHIPPED | OUTPATIENT
Start: 2024-02-05 | End: 2024-02-05 | Stop reason: WASHOUT

## 2024-02-05 RX ORDER — METFORMIN HYDROCHLORIDE 500 MG/1
1000 TABLET, EXTENDED RELEASE ORAL 2 TIMES DAILY
COMMUNITY
Start: 2024-01-08

## 2024-02-05 ASSESSMENT — ENCOUNTER SYMPTOMS
OCCASIONAL FEELINGS OF UNSTEADINESS: 0
LOSS OF SENSATION IN FEET: 0
DEPRESSION: 0

## 2024-02-05 NOTE — PROGRESS NOTES
Patient:  Leah Myrick  YOB: 1955  MRN: [id number]       HPI:       Leah Myrick is a 68 y.o. female who returns today for cardiac follow-up.  She has a history of intermittent chest pain and palpitations. She was noted on a previous Holter monitor to have PACs and a few PVCs as well. Her symptoms improved on a combination of Cardizem CD and Toprol-XL. She has a history of essential hypertension and hyperlipidemia. Cardiac catheterization in June 2012 showed 30-40% stenosis of the right coronary artery and otherwise minimal disease. There was normal LV systolic function.  She has been maintained on Cardizem  mg daily with reasonably good control of her blood pressure and palpitations.     At the time of her last visit in March 2022 she noted intermittent episodes of chest discomfort occurring over a period of 2 weeks or so. She was having aching and pressure-like discomfort in the left upper chest. It was mostly occurring while she was exerting herself.    She was started on aspirin 81 mg daily. Toprol-XL 25 mg daily was resumed. Cardizem CD was continued and she restarted rosuvastatin. She underwent an elective left heart catheterization with Dr. Watts at [facility] 4/11/2022. She was noted to have 75 to 80% tubular mid stenosis of the LAD resulting in PCI/EDMUND to the LAD. Her chest pain resolved, however, she was having some shortness of breath and nausea worsened after taking Brilinta. She was changed to Plavix and Brilinta was discontinued. An echocardiogram on April 26, 2022 was normal. Estimated LV ejection fraction was 65%. No valvular abnormalities noted. RVSP estimated at 22 mmHg. She noted some persistent palpitations. A holter monitor on June 21, 2022 showed normal sinus rhythm with average heart rate 82 bpm. Heart rates ranged from 63 to 117 bpm. Rare PACs and PVCs were noted. No significant dysrhythmias.      She has been doing reasonably well since her last visit.  She remains very  active.  She notes she has some days where she feels significant fatigue.  She has had some highly labile blood sugar readings.  She denies any chest pain or shortness of breath. She denies any orthopnea, PND, or increased peripheral edema. She denies any lightheadedness, near syncope, or syncope. She denies any fever, chills, or cough. She denies any hematemesis, hemoptysis, melena, or hematochezia.   Lab studies dated August 18, 2023 showed a cholesterol 231 with triglycerides 132, HDL 65, and .  Hemoglobin A1c 10.0.  CBC and CMP on July 30, 2023 were normal.  She underwent upper endoscopy on November 15, 2023 and was noted to have moderate chronic active gastritis.   She currently is on metformin and glipizide.  In light of her underlying cardiovascular disease she would likely benefit long-term from initiation of a GLP-1 agonist.  She will discuss this in more detail with Dr. Henry.  Other details as noted below.       Objective:     Vitals:    02/05/24 1537   BP: 132/76   Pulse: 74       Wt Readings from Last 4 Encounters:   02/05/24 73.8 kg (162 lb 12.8 oz)   05/04/23 74.4 kg (164 lb 2 oz)   05/06/22 74.8 kg (165 lb)   04/19/22 75.3 kg (166 lb)       Allergies:     No Known Allergies     Medications:     Current Outpatient Medications   Medication Instructions    acetaminophen (Tylenol 8 HOUR) 650 mg ER tablet Take 1 tablet (650 mg) by mouth every 8 hours if needed.    aspirin 81 mg EC tablet 1 tablet, oral, Daily    celecoxib (CeleBREX) 100 mg capsule 1 capsule, oral, 2 times daily    clopidogrel (Plavix) 75 mg tablet 1 tablet, oral, Daily    dilTIAZem ER (TIAZAC) 240 mg, oral, Daily, Take in the morning    glipiZIDE XL (Glucotrol XL) 5 mg 24 hr tablet 1 tablet, oral, 2 times daily    isosorbide mononitrate ER (IMDUR) 30 mg, oral, Daily, Do not crush or chew.    metFORMIN XR (GLUCOPHAGE-XR) 1,000 mg, oral, 2 times daily    metoprolol succinate XL (Toprol-XL) 25 mg 24 hr tablet 1 tablet, oral, Daily     nitroglycerin (Nitrostat) 0.4 mg SL tablet PLACE 1 TABLET UNDER THE TONGUE EVERY 5 MINUTES FOR UP TO 3 DOSES AS NEEDED FOR CHEST PAIN.CALL 911 IF PAIN PERSISTS.    pantoprazole (ProtoNix) 40 mg EC tablet 1 tablet, oral, Daily    rosuvastatin (Crestor) 5 mg tablet 1 tablet, oral, Daily       Physical Examination:   GENERAL:  Well developed, well nourished, in no acute distress.  CHEST:  Symmetric and nontender.  NEURO/PSYCH:  Alert and oriented times three with approppriate behavior and responses.  NECK:  Supple, no JVD, no bruit.  LUNGS:  Clear to auscultation bilaterally, normal respiratory effort.  HEART:  Rate and rhythm regular with no evident murmur, no gallop appreciated.        There are no rubs, clicks or heaves.  EXTREMITIES:  Warm with good color, no clubbing or cyanosis.  There is no edema noted.  PERIPHERAL VASCULAR:  Pulses present and equally palpable; 2+ throughout.      Lab:     CBC:   Lab Results   Component Value Date    WBC 7.5 04/11/2022    RBC 4.97 04/11/2022    HGB 15.4 04/11/2022    HCT 45.8 04/11/2022     04/11/2022        CMP:    Lab Results   Component Value Date     04/11/2022    K 3.9 04/11/2022     04/11/2022    CO2 24 04/11/2022    BUN 14 04/11/2022    CREATININE 0.66 04/11/2022    GLUCOSE 290 (H) 04/11/2022    CALCIUM 8.7 04/11/2022       HgBA1c:    Lab Results   Component Value Date    HGBA1C 10.0 (H) 08/18/2023       BMP:  Lab Results   Component Value Date     04/11/2022     04/11/2022     07/16/2020    K 3.9 04/11/2022    K 4.1 04/11/2022    K 3.5 07/16/2020     04/11/2022     04/11/2022     07/16/2020    CO2 24 04/11/2022    CO2 27 04/11/2022    CO2 27 07/16/2020    BUN 14 04/11/2022    BUN 12 04/11/2022    BUN 10 07/16/2020    CREATININE 0.66 04/11/2022    CREATININE 0.49 (L) 04/11/2022    CREATININE 0.49 (L) 07/16/2020       CBC:  Lab Results   Component Value Date    WBC 7.5 04/11/2022    WBC 8.4 07/16/2020    RBC 4.97  "04/11/2022    RBC 4.65 07/16/2020    HGB 15.4 04/11/2022    HGB 14.3 07/16/2020    HCT 45.8 04/11/2022    HCT 41.8 07/16/2020    MCV 92 04/11/2022    MCV 90 07/16/2020    MCHC 33.6 04/11/2022    MCHC 34.2 07/16/2020    RDW 11.8 04/11/2022    RDW 11.8 07/16/2020     04/11/2022     07/16/2020       Cardiac Enzymes:    No results found for: \"TROPHS\"    Hepatic Function Panel:    Lab Results   Component Value Date    ALKPHOS 93 07/16/2020    ALT 16 07/16/2020    AST 16 07/16/2020    PROT 6.8 07/16/2020    BILITOT 0.5 07/16/2020    BILIDIR 0.1 07/16/2020       Problem List:     Patient Active Problem List   Diagnosis    Atherosclerosis of native coronary artery of native heart with angina pectoris (CMS/Formerly [facility])    Angina pectoris (CMS/Formerly [facility])    Effort angina    Internal derangement of right knee    Knee pain, right    Nausea in adult    Palpitations    Shortness of breath on exertion    Status post angioplasty with stent    Systolic murmur    Varicose veins of right lower extremity with inflammation       Asessment:     Problem List Items Addressed This Visit             ICD-10-CM    Atherosclerosis of native coronary artery of native heart without angina pectoris - Primary I25.10    Relevant Medications    clopidogrel (Plavix) 75 mg tablet    Other Relevant Orders    Follow Up In Cardiology    CBC    Comprehensive Metabolic Panel    Status post angioplasty with stent Z95.820    Relevant Medications    clopidogrel (Plavix) 75 mg tablet    Other Relevant Orders    Follow Up In Cardiology    CBC    Comprehensive Metabolic Panel    Mixed hyperlipidemia E78.2    Relevant Orders    Lipid Panel    Diabetes mellitus type II, non insulin dependent (CMS/Formerly [facility]) E11.9    Relevant Medications    clopidogrel (Plavix) 75 mg tablet    Other Relevant Orders    Follow Up In Cardiology    Lipid Panel    CBC    Comprehensive Metabolic Panel         "

## 2024-02-12 DIAGNOSIS — E11.8 TYPE 2 DIABETES MELLITUS WITH COMPLICATION, WITHOUT LONG-TERM CURRENT USE OF INSULIN (HCC): ICD-10-CM

## 2024-02-13 RX ORDER — METFORMIN HYDROCHLORIDE 500 MG/1
TABLET, EXTENDED RELEASE ORAL
Qty: 120 TABLET | Refills: 1 | Status: SHIPPED | OUTPATIENT
Start: 2024-02-13

## 2024-03-04 RX ORDER — PANTOPRAZOLE SODIUM 40 MG/1
40 TABLET, DELAYED RELEASE ORAL DAILY
Qty: 30 TABLET | Refills: 0 | Status: SHIPPED | OUTPATIENT
Start: 2024-03-04 | End: 2024-06-02

## 2024-03-04 RX ORDER — CELECOXIB 100 MG/1
CAPSULE ORAL
Qty: 60 CAPSULE | Refills: 0 | Status: SHIPPED | OUTPATIENT
Start: 2024-03-04

## 2024-03-15 DIAGNOSIS — E11.8 TYPE 2 DIABETES MELLITUS WITH COMPLICATION, WITHOUT LONG-TERM CURRENT USE OF INSULIN (HCC): ICD-10-CM

## 2024-03-18 RX ORDER — GLIPIZIDE 5 MG/1
TABLET, FILM COATED, EXTENDED RELEASE ORAL
Qty: 360 TABLET | Refills: 0 | Status: SHIPPED | OUTPATIENT
Start: 2024-03-18

## 2024-04-09 RX ORDER — CELECOXIB 100 MG/1
CAPSULE ORAL
Qty: 60 CAPSULE | Refills: 0 | Status: SHIPPED | OUTPATIENT
Start: 2024-04-09

## 2024-04-15 ENCOUNTER — OFFICE VISIT (OUTPATIENT)
Dept: FAMILY MEDICINE CLINIC | Age: 69
End: 2024-04-15
Payer: MEDICARE

## 2024-04-15 VITALS
OXYGEN SATURATION: 98 % | HEIGHT: 60 IN | HEART RATE: 74 BPM | RESPIRATION RATE: 17 BRPM | DIASTOLIC BLOOD PRESSURE: 68 MMHG | WEIGHT: 160 LBS | BODY MASS INDEX: 31.41 KG/M2 | SYSTOLIC BLOOD PRESSURE: 130 MMHG

## 2024-04-15 DIAGNOSIS — E11.9 TYPE 2 DIABETES MELLITUS WITHOUT COMPLICATION, WITHOUT LONG-TERM CURRENT USE OF INSULIN (HCC): Primary | ICD-10-CM

## 2024-04-15 LAB — HBA1C MFR BLD: 10.9 %

## 2024-04-15 PROCEDURE — 3046F HEMOGLOBIN A1C LEVEL >9.0%: CPT | Performed by: FAMILY MEDICINE

## 2024-04-15 PROCEDURE — 1123F ACP DISCUSS/DSCN MKR DOCD: CPT | Performed by: FAMILY MEDICINE

## 2024-04-15 PROCEDURE — 2022F DILAT RTA XM EVC RTNOPTHY: CPT | Performed by: FAMILY MEDICINE

## 2024-04-15 PROCEDURE — 3078F DIAST BP <80 MM HG: CPT | Performed by: FAMILY MEDICINE

## 2024-04-15 PROCEDURE — 1036F TOBACCO NON-USER: CPT | Performed by: FAMILY MEDICINE

## 2024-04-15 PROCEDURE — 83036 HEMOGLOBIN GLYCOSYLATED A1C: CPT | Performed by: FAMILY MEDICINE

## 2024-04-15 PROCEDURE — G8400 PT W/DXA NO RESULTS DOC: HCPCS | Performed by: FAMILY MEDICINE

## 2024-04-15 PROCEDURE — 3017F COLORECTAL CA SCREEN DOC REV: CPT | Performed by: FAMILY MEDICINE

## 2024-04-15 PROCEDURE — G8427 DOCREV CUR MEDS BY ELIG CLIN: HCPCS | Performed by: FAMILY MEDICINE

## 2024-04-15 PROCEDURE — 99214 OFFICE O/P EST MOD 30 MIN: CPT | Performed by: FAMILY MEDICINE

## 2024-04-15 PROCEDURE — G8417 CALC BMI ABV UP PARAM F/U: HCPCS | Performed by: FAMILY MEDICINE

## 2024-04-15 PROCEDURE — 3075F SYST BP GE 130 - 139MM HG: CPT | Performed by: FAMILY MEDICINE

## 2024-04-15 PROCEDURE — 1090F PRES/ABSN URINE INCON ASSESS: CPT | Performed by: FAMILY MEDICINE

## 2024-04-15 RX ORDER — ERGOCALCIFEROL 1.25 MG/1
50000 CAPSULE ORAL WEEKLY
Qty: 12 CAPSULE | Refills: 3 | Status: SHIPPED | OUTPATIENT
Start: 2024-04-15

## 2024-04-15 ASSESSMENT — ENCOUNTER SYMPTOMS
EYES NEGATIVE: 1
RESPIRATORY NEGATIVE: 1
GASTROINTESTINAL NEGATIVE: 1
COUGH: 0
RHINORRHEA: 0
CHEST TIGHTNESS: 0

## 2024-04-15 ASSESSMENT — PATIENT HEALTH QUESTIONNAIRE - PHQ9
SUM OF ALL RESPONSES TO PHQ QUESTIONS 1-9: 0
SUM OF ALL RESPONSES TO PHQ QUESTIONS 1-9: 0
SUM OF ALL RESPONSES TO PHQ9 QUESTIONS 1 & 2: 0
1. LITTLE INTEREST OR PLEASURE IN DOING THINGS: NOT AT ALL
SUM OF ALL RESPONSES TO PHQ QUESTIONS 1-9: 0
2. FEELING DOWN, DEPRESSED OR HOPELESS: NOT AT ALL
SUM OF ALL RESPONSES TO PHQ QUESTIONS 1-9: 0

## 2024-04-15 NOTE — PROGRESS NOTES
Patient is seen in follow up for   Chief Complaint   Patient presents with    Diabetes     Last A1c was 10.0    Hypertension    Hyperlipidemia     Diabetes  She presents for her follow-up diabetic visit. She has type 2 diabetes mellitus. The initial diagnosis of diabetes was made 8 years ago. Her disease course has been stable. There are no hypoglycemic associated symptoms. Pertinent negatives for hypoglycemia include no dizziness. There are no diabetic associated symptoms. Pertinent negatives for diabetes include no fatigue. Symptoms are stable. Current diabetic treatment includes oral agent (dual therapy). She is compliant with treatment most of the time.   Hypertension  This is a chronic problem. The current episode started more than 1 year ago. The problem is unchanged. The problem is controlled. There are no associated agents to hypertension. Past treatments include calcium channel blockers, beta blockers and direct vasodilators. The current treatment provides significant improvement.   Hyperlipidemia  This is a chronic problem. The current episode started more than 1 year ago. The problem is controlled. Recent lipid tests were reviewed and are normal. There are no known factors aggravating her hyperlipidemia. Current antihyperlipidemic treatment includes statins. The current treatment provides significant improvement of lipids.       Past Medical History:   Diagnosis Date    Atrial fibrillation (HCC)     Congenital heart disease     Diabetes mellitus (HCC)     Hyperlipidemia     Hypertension      Patient Active Problem List    Diagnosis Date Noted    Abdominal pain, epigastric 08/29/2023    Status post angioplasty with stent 05/02/2023    Systolic murmur 05/02/2023    Edema of right lower extremity 07/12/2021    Fatigue 04/27/2021    Nonintractable headache 04/27/2021    Other chest pain 08/30/2020    Gallbladder disease 08/30/2020    Coronary atherosclerosis 10/30/2017    Cyst of kidney, acquired 10/30/2017

## 2024-04-22 RX ORDER — PANTOPRAZOLE SODIUM 40 MG/1
40 TABLET, DELAYED RELEASE ORAL DAILY
Qty: 90 TABLET | Refills: 3 | Status: SHIPPED | OUTPATIENT
Start: 2024-04-22 | End: 2025-04-17

## 2024-05-05 DIAGNOSIS — E11.8 TYPE 2 DIABETES MELLITUS WITH COMPLICATION, WITHOUT LONG-TERM CURRENT USE OF INSULIN (HCC): ICD-10-CM

## 2024-05-06 RX ORDER — CELECOXIB 100 MG/1
CAPSULE ORAL
Qty: 60 CAPSULE | Refills: 0 | Status: SHIPPED | OUTPATIENT
Start: 2024-05-06

## 2024-05-06 RX ORDER — METFORMIN HYDROCHLORIDE 500 MG/1
TABLET, EXTENDED RELEASE ORAL
Qty: 120 TABLET | Refills: 1 | Status: SHIPPED | OUTPATIENT
Start: 2024-05-06

## 2024-05-28 ENCOUNTER — ENROLLMENT (OUTPATIENT)
Dept: PHARMACY | Facility: CLINIC | Age: 69
End: 2024-05-28

## 2024-06-04 ENCOUNTER — TELEPHONE (OUTPATIENT)
Dept: DIABETES SERVICES | Age: 69
End: 2024-06-04

## 2024-06-10 RX ORDER — CELECOXIB 100 MG/1
CAPSULE ORAL
Qty: 60 CAPSULE | Refills: 0 | Status: SHIPPED | OUTPATIENT
Start: 2024-06-10

## 2024-06-21 ENCOUNTER — TELEMEDICINE (OUTPATIENT)
Dept: FAMILY MEDICINE CLINIC | Age: 69
End: 2024-06-21
Payer: MEDICARE

## 2024-06-21 DIAGNOSIS — Z00.00 MEDICARE ANNUAL WELLNESS VISIT, SUBSEQUENT: Primary | ICD-10-CM

## 2024-06-21 PROCEDURE — G0439 PPPS, SUBSEQ VISIT: HCPCS | Performed by: NURSE PRACTITIONER

## 2024-06-21 PROCEDURE — 3017F COLORECTAL CA SCREEN DOC REV: CPT | Performed by: NURSE PRACTITIONER

## 2024-06-21 PROCEDURE — 1123F ACP DISCUSS/DSCN MKR DOCD: CPT | Performed by: NURSE PRACTITIONER

## 2024-06-21 SDOH — ECONOMIC STABILITY: INCOME INSECURITY: HOW HARD IS IT FOR YOU TO PAY FOR THE VERY BASICS LIKE FOOD, HOUSING, MEDICAL CARE, AND HEATING?: NOT HARD AT ALL

## 2024-06-21 SDOH — ECONOMIC STABILITY: FOOD INSECURITY: WITHIN THE PAST 12 MONTHS, YOU WORRIED THAT YOUR FOOD WOULD RUN OUT BEFORE YOU GOT MONEY TO BUY MORE.: NEVER TRUE

## 2024-06-21 SDOH — ECONOMIC STABILITY: FOOD INSECURITY: WITHIN THE PAST 12 MONTHS, THE FOOD YOU BOUGHT JUST DIDN'T LAST AND YOU DIDN'T HAVE MONEY TO GET MORE.: NEVER TRUE

## 2024-06-21 ASSESSMENT — PATIENT HEALTH QUESTIONNAIRE - PHQ9
2. FEELING DOWN, DEPRESSED OR HOPELESS: NOT AT ALL
SUM OF ALL RESPONSES TO PHQ9 QUESTIONS 1 & 2: 0
1. LITTLE INTEREST OR PLEASURE IN DOING THINGS: NOT AT ALL
SUM OF ALL RESPONSES TO PHQ QUESTIONS 1-9: 0

## 2024-06-21 NOTE — PROGRESS NOTES
Medicare Annual Wellness Visit    [place] is here for Medicare AWV (Spoke to Laura Duarte NP)    Assessment & Plan   Medicare annual wellness visit, subsequent    Recommendations for Preventive Services Due: see orders and patient instructions/AVS.  Recommended screening schedule for the next 5-10 years is provided to the patient in written form: see Patient Instructions/AVS.     Return in 1 year (on 6/21/2025).     Subjective       Patient's complete Health Risk Assessment and screening values have been reviewed and are found in Flowsheets. The following problems were reviewed today and where indicated follow up appointments were made and/or referrals ordered.    Positive Risk Factor Screenings with Interventions:                Activity, Diet, and Weight:  On average, how many days per week do you engage in moderate to strenuous exercise (like a brisk walk)?: 3 days  On average, how many minutes do you engage in exercise at this level?: 30 min    Do you eat balanced/healthy meals regularly?: Yes    There is no height or weight on file to calculate BMI. (!) Abnormal    Obesity Interventions:  Patient declines any further evaluation or treatment                 Advanced Directives:  Do you have a Living Will?: (!) No    Intervention:  has NO advanced directive - not interested in additional information                     Objective      Patient-Reported Vitals  No data recorded          No Known Allergies  Prior to Visit Medications    Medication Sig Taking? Authorizing Provider   celecoxib (CELEBREX) 100 MG capsule TAKE ONE CAPSULE BY MOUTH TWO TIMES A DAY Yes Olaf Gabriel MD   metFORMIN (GLUCOPHAGE-XR) 500 MG extended release tablet TAKE TWO TABLETS BY MOUTH TWO TIMES A DAY Yes Olaf Gabriel MD   pantoprazole (PROTONIX) 40 MG tablet Take 1 tablet by mouth daily Yes Ingrid Kaplan, APRN - CNP   empagliflozin (JARDIANCE) 25 MG tablet Take 1 tablet by mouth daily Yes Olaf Gabriel MD   vitamin D

## 2024-07-08 RX ORDER — CELECOXIB 100 MG/1
CAPSULE ORAL
Qty: 180 CAPSULE | Refills: 1 | Status: SHIPPED | OUTPATIENT
Start: 2024-07-08

## 2024-07-08 NOTE — TELEPHONE ENCOUNTER
Future Appointments    Encounter Information   Provider Department Appt Notes   7/15/2024 Olaf Gabriel MD [facility] 3 mo f/u     Past Visits    Date Provider Specialty Visit Type Primary Dx   06/21/2024 Laura Duarte APRN - CNP Family Medicine Telemedicine Medicare annual wellness visit, subsequent   04/15/2024 Olaf Gabriel MD Family Medicine Office Visit Type 2 diabetes mellitus without complication, without long-term current use of insulin (HCC)

## 2024-07-12 DIAGNOSIS — E11.8 TYPE 2 DIABETES MELLITUS WITH COMPLICATION, WITHOUT LONG-TERM CURRENT USE OF INSULIN (HCC): ICD-10-CM

## 2024-07-12 NOTE — TELEPHONE ENCOUNTER
[facility] CLINICAL PHARMACY: STATIN THERAPY REVIEW  Identified statin use in persons with cardiovascular disease care gap per [employer] Records dated 24.   Also identified as having statin use in persons with diabetes care gap.    ASSESSMENT:  Patient has been identified as having a diagnosis for clinical ASCVD or event (e.g., inpatient hospitalization for MI, CABG, PCI or other revascularization procedures) in the measurement year and not currently filling a moderate or high intensity statin. Patients included in this care gap are males age 21-75 and females age 40-75.     Per chart review, patient has rosuvastatin 10 mg daily on med list (historical) but no statin claims yet through [facility] insurance in . She sees outside cardiology at [facility]. Per CareEverywhere, LOV with cardiology 24 - rosuvastatin 5 mg daily in note. Unsure when last Rx written but likely now ? Appears last Rx from Dr. Kirkpatrick for 10 mg tab written 2023 and would now be     Per Reconciled Dispense Report:  Rosuvastatin 10 Mg  Tab Asce 2023 90 90 each Hu Kirkpatrick MD [facility] - AM...   Rosuvastatin 5 Mg   Tab John 2022 90 90 each MOISES ARSHAD MD [facility] - AM...   Rosuvastatin 20 Mg  Tab Nova 10/14/2020 90 90 each GABINO DILLARD MD [facility] - AM...     Lab Results   Component Value Date    CHOL 231 (H) 2023    TRIG 132 2023    HDL 65 (H) 2023     ALT   Date Value Ref Range Status   2023 15 0 - 33 U/L Final     AST   Date Value Ref Range Status   2023 21 0 - 35 U/L Final       The ASCVD Risk score (Lauren REYNOLDS, et al., 2019) failed to calculate for the following reasons:    Unable to determine if patient is Non-      DIABETES ADHERENCE    Insurance Records claims through 24 ( PDC = n/a; YTD PDC =  99%; Potential Fail Date: 24 ):   Metformin 500 mg tab & Jardiance 25 mg tab last

## 2024-07-12 NOTE — TELEPHONE ENCOUNTER
Patient returned adherence call. She says she should be on Rosuvastatin 10 mg. She is out and does need refills. She stated she was out of Glipizide Er 5 mg as well and needs refills. She does have enough Metformin and said she takes a lot of diabetic medications.She also wanted to know what time her appointment on Monday is reminded her 07/15/24 @ 3:45. Thanked her for returning call.      Stephanie Odell CPhT.  Population Health Clinical   [facility]  Toll free: [phone] Option 1

## 2024-07-15 ENCOUNTER — OFFICE VISIT (OUTPATIENT)
Dept: FAMILY MEDICINE CLINIC | Age: 69
End: 2024-07-15
Payer: MEDICARE

## 2024-07-15 VITALS
WEIGHT: 155 LBS | RESPIRATION RATE: 17 BRPM | HEIGHT: 60 IN | SYSTOLIC BLOOD PRESSURE: 122 MMHG | BODY MASS INDEX: 30.43 KG/M2 | DIASTOLIC BLOOD PRESSURE: 60 MMHG | HEART RATE: 76 BPM | OXYGEN SATURATION: 98 %

## 2024-07-15 DIAGNOSIS — I10 PRIMARY HYPERTENSION: ICD-10-CM

## 2024-07-15 DIAGNOSIS — Z79.4 TYPE 2 DIABETES MELLITUS WITHOUT COMPLICATION, WITH LONG-TERM CURRENT USE OF INSULIN (HCC): ICD-10-CM

## 2024-07-15 DIAGNOSIS — E78.00 HYPERCHOLESTEREMIA: Primary | ICD-10-CM

## 2024-07-15 DIAGNOSIS — E11.9 TYPE 2 DIABETES MELLITUS WITHOUT COMPLICATION, WITH LONG-TERM CURRENT USE OF INSULIN (HCC): ICD-10-CM

## 2024-07-15 LAB — HBA1C MFR BLD: 9.6 %

## 2024-07-15 PROCEDURE — G8417 CALC BMI ABV UP PARAM F/U: HCPCS | Performed by: FAMILY MEDICINE

## 2024-07-15 PROCEDURE — G8400 PT W/DXA NO RESULTS DOC: HCPCS | Performed by: FAMILY MEDICINE

## 2024-07-15 PROCEDURE — 99214 OFFICE O/P EST MOD 30 MIN: CPT | Performed by: FAMILY MEDICINE

## 2024-07-15 PROCEDURE — 83036 HEMOGLOBIN GLYCOSYLATED A1C: CPT | Performed by: FAMILY MEDICINE

## 2024-07-15 PROCEDURE — G8427 DOCREV CUR MEDS BY ELIG CLIN: HCPCS | Performed by: FAMILY MEDICINE

## 2024-07-15 PROCEDURE — 3078F DIAST BP <80 MM HG: CPT | Performed by: FAMILY MEDICINE

## 2024-07-15 PROCEDURE — 1123F ACP DISCUSS/DSCN MKR DOCD: CPT | Performed by: FAMILY MEDICINE

## 2024-07-15 PROCEDURE — 2022F DILAT RTA XM EVC RTNOPTHY: CPT | Performed by: FAMILY MEDICINE

## 2024-07-15 PROCEDURE — 3074F SYST BP LT 130 MM HG: CPT | Performed by: FAMILY MEDICINE

## 2024-07-15 PROCEDURE — 1090F PRES/ABSN URINE INCON ASSESS: CPT | Performed by: FAMILY MEDICINE

## 2024-07-15 PROCEDURE — 3046F HEMOGLOBIN A1C LEVEL >9.0%: CPT | Performed by: FAMILY MEDICINE

## 2024-07-15 PROCEDURE — 1036F TOBACCO NON-USER: CPT | Performed by: FAMILY MEDICINE

## 2024-07-15 PROCEDURE — 3017F COLORECTAL CA SCREEN DOC REV: CPT | Performed by: FAMILY MEDICINE

## 2024-07-15 RX ORDER — ROSUVASTATIN CALCIUM 10 MG/1
10 TABLET, COATED ORAL DAILY
Qty: 90 TABLET | Refills: 1 | Status: SHIPPED | OUTPATIENT
Start: 2024-07-15

## 2024-07-15 RX ORDER — ERGOCALCIFEROL 1.25 MG/1
50000 CAPSULE ORAL WEEKLY
Qty: 12 CAPSULE | Refills: 3 | Status: SHIPPED | OUTPATIENT
Start: 2024-07-15

## 2024-07-15 ASSESSMENT — ENCOUNTER SYMPTOMS
COUGH: 0
GASTROINTESTINAL NEGATIVE: 1
RHINORRHEA: 0
CHEST TIGHTNESS: 0
EYES NEGATIVE: 1
RESPIRATORY NEGATIVE: 1

## 2024-07-15 NOTE — PROGRESS NOTES
25 MG extended release tablet    Other Relevant Orders    Comprehensive Metabolic Panel    CBC with Auto Differential       Plan  Orders Placed This Encounter   Procedures    Lipid Panel     Standing Status:   Future     Standing Expiration Date:   7/11/2025    Comprehensive Metabolic Panel     Standing Status:   Future     Standing Expiration Date:   7/11/2025    CBC with Auto Differential     Standing Status:   Future     Standing Expiration Date:   7/11/2025    POCT glycosylated hemoglobin (Hb A1C)     Orders Placed This Encounter   Medications    vitamin D (ERGOCALCIFEROL) 1.25 MG (01125 UT) CAPS capsule     Sig: Take 1 capsule by mouth once a week     Dispense:  12 capsule     Refill:  3    rosuvastatin (CRESTOR) 10 MG tablet     Sig: Take 1 tablet by mouth daily     Dispense:  90 tablet     Refill:  1    empagliflozin (JARDIANCE) 25 MG tablet     Sig: Take 1 tablet by mouth daily     Dispense:  90 tablet     Refill:  3     No follow-ups on file.  Olaf Gabriel MD

## 2024-07-16 RX ORDER — GLIPIZIDE 5 MG/1
TABLET, FILM COATED, EXTENDED RELEASE ORAL
Qty: 360 TABLET | Refills: 0 | Status: SHIPPED | OUTPATIENT
Start: 2024-07-16

## 2024-07-16 NOTE — TELEPHONE ENCOUNTER
Olaf Gabriel MD, patient out of refills of glipizide. Rx pended for your signature/modification as appropriate    Last Visit: 7/15/24  Next Visit: 10/14/24    Thank you,  Romi Lomax, PharmD, [facility] Hospital[facility]  Population Health Pharmacist  [facility]  Department, toll free: [phone], option 1   ===========================================================================  Noted, thank you!    Pt had 7/15/24 PCP OV, rosuvastatin was refilled. Appears she needs refill Rx for glipizide. Eligible for 90 day supply per [facility]. NOV 10/14/24.

## 2024-07-17 NOTE — TELEPHONE ENCOUNTER
Rx was signed by provider, thank you!    For Pharmacy Admin Tracking Only    Program: [employer]  CPA in place:  No  Recommendation Provided To: Provider: 1 via Note to Provider and Patient/Caregiver: 3 via Telephone  Intervention Detail: Adherence Monitoring: 3 and Refill(s) Provided  Intervention Accepted By: Provider: 1 and Patient/Caregiver: 3  Gap Closed?: Yes   Time Spent (min): 20

## 2024-10-10 ENCOUNTER — OFFICE VISIT (OUTPATIENT)
Dept: FAMILY MEDICINE CLINIC | Age: 69
End: 2024-10-10
Payer: MEDICARE

## 2024-10-10 VITALS
BODY MASS INDEX: 30.43 KG/M2 | HEIGHT: 60 IN | TEMPERATURE: 97.1 F | OXYGEN SATURATION: 96 % | RESPIRATION RATE: 11 BRPM | SYSTOLIC BLOOD PRESSURE: 122 MMHG | DIASTOLIC BLOOD PRESSURE: 60 MMHG | WEIGHT: 155 LBS | HEART RATE: 73 BPM

## 2024-10-10 DIAGNOSIS — N39.0 URINARY TRACT INFECTION WITHOUT HEMATURIA, SITE UNSPECIFIED: Primary | ICD-10-CM

## 2024-10-10 DIAGNOSIS — E11.65 TYPE 2 DIABETES MELLITUS WITH HYPERGLYCEMIA, WITHOUT LONG-TERM CURRENT USE OF INSULIN (HCC): ICD-10-CM

## 2024-10-10 DIAGNOSIS — N89.8 VAGINAL ITCHING: ICD-10-CM

## 2024-10-10 LAB
BILIRUBIN, POC: NORMAL
BLOOD URINE, POC: NORMAL
CLARITY, POC: CLEAR
COLOR, POC: NORMAL
GLUCOSE URINE, POC: NORMAL MG/DL
HBA1C MFR BLD: 9.4 %
KETONES, POC: NORMAL MG/DL
LEUKOCYTE EST, POC: NORMAL
NITRITE, POC: NORMAL
PH, POC: 5.5
PROTEIN, POC: NORMAL MG/DL
SPECIFIC GRAVITY, POC: 1.01
UROBILINOGEN, POC: NORMAL MG/DL

## 2024-10-10 PROCEDURE — 2022F DILAT RTA XM EVC RTNOPTHY: CPT | Performed by: STUDENT IN AN ORGANIZED HEALTH CARE EDUCATION/TRAINING PROGRAM

## 2024-10-10 PROCEDURE — 3046F HEMOGLOBIN A1C LEVEL >9.0%: CPT | Performed by: STUDENT IN AN ORGANIZED HEALTH CARE EDUCATION/TRAINING PROGRAM

## 2024-10-10 PROCEDURE — G8427 DOCREV CUR MEDS BY ELIG CLIN: HCPCS | Performed by: STUDENT IN AN ORGANIZED HEALTH CARE EDUCATION/TRAINING PROGRAM

## 2024-10-10 PROCEDURE — 81003 URINALYSIS AUTO W/O SCOPE: CPT | Performed by: STUDENT IN AN ORGANIZED HEALTH CARE EDUCATION/TRAINING PROGRAM

## 2024-10-10 PROCEDURE — G8484 FLU IMMUNIZE NO ADMIN: HCPCS | Performed by: STUDENT IN AN ORGANIZED HEALTH CARE EDUCATION/TRAINING PROGRAM

## 2024-10-10 PROCEDURE — G8400 PT W/DXA NO RESULTS DOC: HCPCS | Performed by: STUDENT IN AN ORGANIZED HEALTH CARE EDUCATION/TRAINING PROGRAM

## 2024-10-10 PROCEDURE — 1090F PRES/ABSN URINE INCON ASSESS: CPT | Performed by: STUDENT IN AN ORGANIZED HEALTH CARE EDUCATION/TRAINING PROGRAM

## 2024-10-10 PROCEDURE — 3078F DIAST BP <80 MM HG: CPT | Performed by: STUDENT IN AN ORGANIZED HEALTH CARE EDUCATION/TRAINING PROGRAM

## 2024-10-10 PROCEDURE — 3074F SYST BP LT 130 MM HG: CPT | Performed by: STUDENT IN AN ORGANIZED HEALTH CARE EDUCATION/TRAINING PROGRAM

## 2024-10-10 PROCEDURE — G8417 CALC BMI ABV UP PARAM F/U: HCPCS | Performed by: STUDENT IN AN ORGANIZED HEALTH CARE EDUCATION/TRAINING PROGRAM

## 2024-10-10 PROCEDURE — 3017F COLORECTAL CA SCREEN DOC REV: CPT | Performed by: STUDENT IN AN ORGANIZED HEALTH CARE EDUCATION/TRAINING PROGRAM

## 2024-10-10 PROCEDURE — 1123F ACP DISCUSS/DSCN MKR DOCD: CPT | Performed by: STUDENT IN AN ORGANIZED HEALTH CARE EDUCATION/TRAINING PROGRAM

## 2024-10-10 PROCEDURE — 99214 OFFICE O/P EST MOD 30 MIN: CPT | Performed by: STUDENT IN AN ORGANIZED HEALTH CARE EDUCATION/TRAINING PROGRAM

## 2024-10-10 PROCEDURE — 1036F TOBACCO NON-USER: CPT | Performed by: STUDENT IN AN ORGANIZED HEALTH CARE EDUCATION/TRAINING PROGRAM

## 2024-10-10 RX ORDER — MICONAZOLE NITRATE 20 MG/G
CREAM TOPICAL
Qty: 1 EACH | Refills: 1 | Status: SHIPPED | OUTPATIENT
Start: 2024-10-10

## 2024-10-10 RX ORDER — SULFAMETHOXAZOLE/TRIMETHOPRIM 800-160 MG
1 TABLET ORAL 2 TIMES DAILY
Qty: 10 TABLET | Refills: 0 | Status: SHIPPED | OUTPATIENT
Start: 2024-10-10 | End: 2024-10-15

## 2024-10-10 RX ORDER — ORAL SEMAGLUTIDE 3 MG/1
1 TABLET ORAL DAILY
Qty: 30 TABLET | Refills: 0 | Status: SHIPPED | OUTPATIENT
Start: 2024-10-10

## 2024-10-10 ASSESSMENT — ENCOUNTER SYMPTOMS: ABDOMINAL PAIN: 1

## 2024-10-10 NOTE — PROGRESS NOTES
1428   BP: 122/60   Site: Left Upper Arm   Position: Sitting   Cuff Size: Medium Adult   Pulse: 73   Resp: 11   Temp: 97.1 °F (36.2 °C)   TempSrc: Temporal   SpO2: 96%   Weight: 70.3 kg (155 lb)   Height: 1.524 m (5')       Physical Exam  Vitals reviewed.   Constitutional:       General: She is not in acute distress.  HENT:      Head: Normocephalic.   Pulmonary:      Effort: Pulmonary effort is normal. No respiratory distress.   Abdominal:      Tenderness: There is left CVA tenderness. There is no right CVA tenderness.   Musculoskeletal:      Cervical back: Normal range of motion.   Skin:     General: Skin is warm and dry.   Neurological:      Mental Status: She is alert.          Results for orders placed or performed in visit on 10/10/24   POCT Urinalysis No Micro (Auto)   Result Value Ref Range    Color, UA PALE YELLOW     Clarity, UA CLEAR     Glucose, UA POC 3+ mg/dL    Bilirubin, UA NEG     Ketones, UA NEG mg/dL    Spec Grav, UA 1.010     Blood, UA POC NEG     pH, UA 5.5     Protein, UA POC NEG mg/dL    Urobilinogen, UA NEG mg/dL    Leukocytes, UA NEG     Nitrite, UA NEG        Hemoglobin A1C   Date Value Ref Range Status   07/15/2024 9.6 % Final     A1c 10/10/24 in office today 9.4%    Assessment & Plan   1. Urinary tract infection without hematuria, site unspecified  -Urine showed 3+ glucose and patient having flank pain  -     POCT Urinalysis No Micro (Auto)  -     Culture, Urine  -     sulfamethoxazole-trimethoprim (BACTRIM DS;SEPTRA DS) 800-160 MG per tablet; Take 1 tablet by mouth 2 times daily for 5 days, Disp-10 tablet, R-0Normal    2. Vaginal itching  -     miconazole (MICOTIN) 2 % cream; Apply topically 2 times daily., Disp-1 each, R-1, Normal    3. Type 2 diabetes mellitus with hyperglycemia, without long-term current use of insulin (HCC)-uncontrolled  -  START   Semaglutide (RYBELSUS) 3 MG TABS; Take 1 each by mouth daily, Disp-30 tablet, R-0Normal  -On glipizide 10 mg twice a day and Jardiance 25

## 2024-10-12 LAB — BACTERIA UR CULT: NORMAL

## 2024-10-17 ENCOUNTER — TELEPHONE (OUTPATIENT)
Dept: FAMILY MEDICINE CLINIC | Age: 69
End: 2024-10-17

## 2024-10-17 DIAGNOSIS — E11.65 TYPE 2 DIABETES MELLITUS WITH HYPERGLYCEMIA, WITHOUT LONG-TERM CURRENT USE OF INSULIN (HCC): Primary | ICD-10-CM

## 2024-10-17 NOTE — TELEPHONE ENCOUNTER
Patient started taking Rybelsus on 10/10. It caused dizziness, throwing up, high bb and stomach pain. The last time she took the medication was on Sunday.    Patient is also on Jardiance. She would like a referral to Dr. Stuart. Patient requested that this message be sent to Dr. Gabriel.      Thank you.

## 2024-10-25 RX ORDER — CELECOXIB 100 MG/1
CAPSULE ORAL
Qty: 180 CAPSULE | Refills: 0 | Status: SHIPPED | OUTPATIENT
Start: 2024-10-25

## 2024-10-25 NOTE — TELEPHONE ENCOUNTER
Rx requested:  Requested Prescriptions     Pending Prescriptions Disp Refills    celecoxib (CELEBREX) 100 MG capsule [Pharmacy Med Name: Celecoxib Oral Capsule 100 MG] 180 capsule 0     Sig: TAKE ONE CAPSULE BY MOUTH TWO TIMES A DAY         Last Office Visit:   7/15/2024      Next Visit Date:  No future appointments.

## 2024-11-10 ENCOUNTER — OFFICE VISIT (OUTPATIENT)
Dept: URGENT CARE | Age: 69
End: 2024-11-10
Payer: MEDICARE

## 2024-11-10 ENCOUNTER — ANCILLARY PROCEDURE (OUTPATIENT)
Dept: URGENT CARE | Age: 69
End: 2024-11-10
Payer: MEDICARE

## 2024-11-10 VITALS
OXYGEN SATURATION: 96 % | DIASTOLIC BLOOD PRESSURE: 79 MMHG | RESPIRATION RATE: 18 BRPM | BODY MASS INDEX: 28.47 KG/M2 | WEIGHT: 145 LBS | TEMPERATURE: 98.6 F | HEIGHT: 60 IN | HEART RATE: 94 BPM | SYSTOLIC BLOOD PRESSURE: 127 MMHG

## 2024-11-10 DIAGNOSIS — E11.65 TYPE 2 DIABETES MELLITUS WITH HYPERGLYCEMIA, WITHOUT LONG-TERM CURRENT USE OF INSULIN (HCC): ICD-10-CM

## 2024-11-10 DIAGNOSIS — S29.9XXA INJURY OF CHEST WALL, INITIAL ENCOUNTER: ICD-10-CM
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
## 2024-11-10 DIAGNOSIS — S29.9XXA INJURY OF CHEST WALL, INITIAL ENCOUNTER: Primary | ICD-10-CM

## 2024-11-10 PROCEDURE — 3074F SYST BP LT 130 MM HG: CPT | Performed by: PHYSICIAN ASSISTANT

## 2024-11-10 PROCEDURE — 3008F BODY MASS INDEX DOCD: CPT | Performed by: PHYSICIAN ASSISTANT

## 2024-11-10 PROCEDURE — 1125F AMNT PAIN NOTED PAIN PRSNT: CPT | Performed by: PHYSICIAN ASSISTANT

## 2024-11-10 PROCEDURE — 99203 OFFICE O/P NEW LOW 30 MIN: CPT | Performed by: PHYSICIAN ASSISTANT

## 2024-11-10 PROCEDURE — 71046 X-RAY EXAM CHEST 2 VIEWS: CPT | Performed by: PHYSICIAN ASSISTANT

## 2024-11-10 PROCEDURE — 3078F DIAST BP <80 MM HG: CPT | Performed by: PHYSICIAN ASSISTANT

## 2024-11-10 ASSESSMENT — PAIN SCALES - GENERAL: PAINLEVEL_OUTOF10: 9

## 2024-11-11 RX ORDER — ORAL SEMAGLUTIDE 3 MG/1
1 TABLET ORAL DAILY
Qty: 30 TABLET | Refills: 0 | Status: SHIPPED | OUTPATIENT
Start: 2024-11-11

## 2024-11-11 NOTE — PROGRESS NOTES
Subjective   Patient ID: Leah Myrick is a 69 y.o. female who presents for Injury (Fall x 4 days ) and Shortness of Breath (Falls right sided chest pain x 4 days).  HPI    Patient presents for chest wall injury.  Patient fell directly on the chest approximately 4 to 5 days ago and reports persistent anterior chest pain on the right with difficulty breathing as a result.  Pain is exacerbated by deep breaths.  No reported attempted conservative management.  No hemoptysis or chest pain.  No other injuries as result of the event.  No other complaints.    Review of Systems    Constitutional:  See HPI   Respiratory: See HPI  Neurologic:  Alert and oriented X4, No numbness, No tingling.    All other systems are negative     Objective     /79 (BP Location: Left arm, Patient Position: Sitting)   Pulse 94   Temp 37 °C (98.6 °F) (Oral)   Resp 18   Ht 1.524 m (5')   Wt 65.8 kg (145 lb)   SpO2 96%   BMI 28.32 kg/m²     Physical Exam    General:  Alert and oriented, No acute distress.    Eye:  Pupils are equal, round and reactive to light, Normal conjunctiva.    HENT:  Normocephalic,   Neck:  Supple    Respiratory: Respirations are non-labored LCTA bilaterally; recreates complaint  Musculoskeletal: Normal ROM and strength  Integumentary:  Warm, Dry, Intact, No pallor, No rash.    Neurologic:  Alert, Oriented, Normal sensory, Cranial Nerves II-XII are grossly intact  Psychiatric:  Cooperative, Appropriate mood & affect.    Assessment/Plan   XR unremarkable.  Pt requesting opiates for the pain, this was denied.  Recommend APAP and ice.  Pt's clinicla presentation is otherwise unremarkable.  Pt is stable for discharge and instructed to follow up with pcp or ER for worsening Sx or any new concerns.  Problem List Items Addressed This Visit    None  Visit Diagnoses       Injury of chest wall, initial encounter    -  Primary    Relevant Orders    XR chest 2 views    Fall, initial encounter        Relevant Orders    XR  chest 2 views            Final diagnoses:   [S29.9XXA] Injury of chest wall, initial encounter   [W19.XXXA] Fall, initial encounter

## 2024-11-22 DIAGNOSIS — E11.8 TYPE 2 DIABETES MELLITUS WITH COMPLICATION, WITHOUT LONG-TERM CURRENT USE OF INSULIN (HCC): ICD-10-CM

## 2024-11-23 NOTE — TELEPHONE ENCOUNTER
requesting medication refill. Please approve or deny this request.    Rx requested:  Requested Prescriptions     Pending Prescriptions Disp Refills    metFORMIN (GLUCOPHAGE-XR) 500 MG extended release tablet [Pharmacy Med Name: metFORMIN HCl ER Oral Tablet Extended Release 24 Hour 500 MG] 120 tablet 0     Sig: TAKE TWO TABLETS BY MOUTH TWO TIMES A DAY         Last Office Visit:   7/15/2024      Next Visit Date:  No future appointments.

## 2024-11-25 RX ORDER — METFORMIN HYDROCHLORIDE 500 MG/1
TABLET, EXTENDED RELEASE ORAL
Qty: 120 TABLET | Refills: 0 | Status: SHIPPED | OUTPATIENT
Start: 2024-11-25

## 2024-12-11 ENCOUNTER — OFFICE VISIT (OUTPATIENT)
Dept: FAMILY MEDICINE CLINIC | Age: 69
End: 2024-12-11
Payer: MEDICARE

## 2024-12-11 VITALS
BODY MASS INDEX: 28.27 KG/M2 | WEIGHT: 144 LBS | HEART RATE: 80 BPM | SYSTOLIC BLOOD PRESSURE: 120 MMHG | HEIGHT: 60 IN | TEMPERATURE: 97.6 F | OXYGEN SATURATION: 98 % | DIASTOLIC BLOOD PRESSURE: 68 MMHG

## 2024-12-11 DIAGNOSIS — E11.65 TYPE 2 DIABETES MELLITUS WITH HYPERGLYCEMIA, WITHOUT LONG-TERM CURRENT USE OF INSULIN (HCC): ICD-10-CM

## 2024-12-11 DIAGNOSIS — M79.672 LEFT FOOT PAIN: ICD-10-CM

## 2024-12-11 DIAGNOSIS — L08.9 INFECTION OF LEFT FOOT: ICD-10-CM

## 2024-12-11 DIAGNOSIS — L08.9 INFECTION OF LEFT FOOT: Primary | ICD-10-CM

## 2024-12-11 LAB
CRP SERPL HS-MCNC: 25.3 MG/L (ref 0–5)
ERYTHROCYTE [SEDIMENTATION RATE] IN BLOOD BY WESTERGREN METHOD: 13 MM (ref 0–30)

## 2024-12-11 PROCEDURE — 99214 OFFICE O/P EST MOD 30 MIN: CPT | Performed by: NURSE PRACTITIONER

## 2024-12-11 PROCEDURE — G8427 DOCREV CUR MEDS BY ELIG CLIN: HCPCS | Performed by: NURSE PRACTITIONER

## 2024-12-11 PROCEDURE — 2022F DILAT RTA XM EVC RTNOPTHY: CPT | Performed by: NURSE PRACTITIONER

## 2024-12-11 PROCEDURE — G8484 FLU IMMUNIZE NO ADMIN: HCPCS | Performed by: NURSE PRACTITIONER

## 2024-12-11 PROCEDURE — 3046F HEMOGLOBIN A1C LEVEL >9.0%: CPT | Performed by: NURSE PRACTITIONER

## 2024-12-11 PROCEDURE — 3017F COLORECTAL CA SCREEN DOC REV: CPT | Performed by: NURSE PRACTITIONER

## 2024-12-11 PROCEDURE — G8400 PT W/DXA NO RESULTS DOC: HCPCS | Performed by: NURSE PRACTITIONER

## 2024-12-11 PROCEDURE — 1123F ACP DISCUSS/DSCN MKR DOCD: CPT | Performed by: NURSE PRACTITIONER

## 2024-12-11 PROCEDURE — 3078F DIAST BP <80 MM HG: CPT | Performed by: NURSE PRACTITIONER

## 2024-12-11 PROCEDURE — 1036F TOBACCO NON-USER: CPT | Performed by: NURSE PRACTITIONER

## 2024-12-11 PROCEDURE — 1090F PRES/ABSN URINE INCON ASSESS: CPT | Performed by: NURSE PRACTITIONER

## 2024-12-11 PROCEDURE — 3074F SYST BP LT 130 MM HG: CPT | Performed by: NURSE PRACTITIONER

## 2024-12-11 PROCEDURE — 1159F MED LIST DOCD IN RCRD: CPT | Performed by: NURSE PRACTITIONER

## 2024-12-11 PROCEDURE — G8417 CALC BMI ABV UP PARAM F/U: HCPCS | Performed by: NURSE PRACTITIONER

## 2024-12-11 RX ORDER — MUPIROCIN 20 MG/G
OINTMENT TOPICAL
Qty: 22 G | Refills: 1 | Status: SHIPPED | OUTPATIENT
Start: 2024-12-11 | End: 2024-12-18

## 2024-12-11 RX ORDER — TRAMADOL HYDROCHLORIDE 50 MG/1
50 TABLET ORAL EVERY 6 HOURS PRN
Qty: 20 TABLET | Refills: 0 | Status: SHIPPED | OUTPATIENT
Start: 2024-12-11 | End: 2024-12-16

## 2024-12-11 RX ORDER — ORAL SEMAGLUTIDE 3 MG/1
1 TABLET ORAL DAILY
Qty: 30 TABLET | Refills: 0 | Status: SHIPPED | OUTPATIENT
Start: 2024-12-11

## 2024-12-11 RX ORDER — CEPHALEXIN 500 MG/1
1 TABLET, FILM COATED ORAL 4 TIMES DAILY
COMMUNITY
Start: 2024-12-08

## 2024-12-11 NOTE — PROGRESS NOTES
Refill:  1    traMADol (ULTRAM) 50 MG tablet     Sig: Take 1 tablet by mouth every 6 hours as needed for Pain for up to 5 days. Intended supply: 5 days. Take lowest dose possible to manage pain Max Daily Amount: 200 mg     Dispense:  20 tablet     Refill:  0     Reduce doses taken as pain becomes manageable     There are no discontinued medications.    On this date 12/11/2024 I have spent 30 minutes reviewing previous notes, test results and face to face with the patient discussing the diagnosis and importance of compliance with the treatment plan as well as documenting on the day of the visit.    Return if symptoms worsen or fail to improve.       Reviewed with the patient: current clinical status,medications, activities and diet.     Side effects, adverse effects of the medication prescribed today, as well as treatment plan/ rationale and result expectations have been discussed with the patient who expresses understanding and desires to proceed.    Close follow up to evaluate treatment results and for coordination of care.  I have reviewed the patient's medical history in detail and updated the computerized patient record.    Please note, this report has been partially produced using speech recognition software and may cause  and /or contain errors related to that system including grammar, punctuation and spelling as well as words and phrases that may seem inappropriate. If there are questions or concerns please feel free to contact me to clarify.    The patient (or guardian, if applicable) and other individuals in attendance with the patient were advised that Artificial Intelligence will be utilized during this visit to record, process the conversation to generate a clinical note, and support improvement of the AI technology. The patient (or guardian, if applicable) and other individuals in attendance at the appointment consented to the use of AI, including the recording.      Gisselle Duval, APRN - CNP

## 2024-12-12 DIAGNOSIS — M79.672 LEFT FOOT PAIN: ICD-10-CM

## 2024-12-12 DIAGNOSIS — L08.9 INFECTION OF LEFT FOOT: Primary | ICD-10-CM

## 2024-12-18 DIAGNOSIS — L08.9 INFECTION OF LEFT FOOT: ICD-10-CM

## 2024-12-18 DIAGNOSIS — M79.672 LEFT FOOT PAIN: ICD-10-CM

## 2024-12-18 LAB
BASOPHILS # BLD: 0.1 K/UL (ref 0–0.2)
BASOPHILS NFR BLD: 1.1 %
CRP SERPL HS-MCNC: 5.1 MG/L (ref 0–5)
EOSINOPHIL # BLD: 0.1 K/UL (ref 0–0.7)
EOSINOPHIL NFR BLD: 1.7 %
ERYTHROCYTE [DISTWIDTH] IN BLOOD BY AUTOMATED COUNT: 12 % (ref 11.5–14.5)
ERYTHROCYTE [SEDIMENTATION RATE] IN BLOOD BY WESTERGREN METHOD: 5 MM (ref 0–30)
HCT VFR BLD AUTO: 46.8 % (ref 37–47)
HGB BLD-MCNC: 15.5 G/DL (ref 12–16)
LYMPHOCYTES # BLD: 2.4 K/UL (ref 1–4.8)
LYMPHOCYTES NFR BLD: 37.3 %
MCH RBC QN AUTO: 30.8 PG (ref 27–31.3)
MCHC RBC AUTO-ENTMCNC: 33.1 % (ref 33–37)
MCV RBC AUTO: 93 FL (ref 79.4–94.8)
MONOCYTES # BLD: 0.5 K/UL (ref 0.2–0.8)
MONOCYTES NFR BLD: 7 %
NEUTROPHILS # BLD: 3.4 K/UL (ref 1.4–6.5)
NEUTS SEG NFR BLD: 52.7 %
PLATELET # BLD AUTO: 241 K/UL (ref 130–400)
RBC # BLD AUTO: 5.03 M/UL (ref 4.2–5.4)
WBC # BLD AUTO: 6.4 K/UL (ref 4.8–10.8)

## 2024-12-19 ENCOUNTER — OFFICE VISIT (OUTPATIENT)
Age: 69
End: 2024-12-19

## 2024-12-19 VITALS
SYSTOLIC BLOOD PRESSURE: 118 MMHG | BODY MASS INDEX: 29.06 KG/M2 | DIASTOLIC BLOOD PRESSURE: 64 MMHG | HEART RATE: 70 BPM | TEMPERATURE: 97.6 F | HEIGHT: 60 IN | OXYGEN SATURATION: 97 % | WEIGHT: 148 LBS

## 2024-12-19 DIAGNOSIS — R09.81 CONGESTION OF NASAL SINUS: ICD-10-CM

## 2024-12-19 DIAGNOSIS — L08.9 INFECTION OF LEFT FOOT: ICD-10-CM

## 2024-12-19 DIAGNOSIS — R50.9 FEVER, UNSPECIFIED FEVER CAUSE: Primary | ICD-10-CM

## 2024-12-19 RX ORDER — BROMPHENIRAMINE MALEATE, PSEUDOEPHEDRINE HYDROCHLORIDE, AND DEXTROMETHORPHAN HYDROBROMIDE 2; 30; 10 MG/5ML; MG/5ML; MG/5ML
10 SYRUP ORAL 4 TIMES DAILY PRN
Qty: 240 ML | Refills: 0 | Status: SHIPPED | OUTPATIENT
Start: 2024-12-19

## 2024-12-19 RX ORDER — PSEUDOEPHEDRINE HCL 30 MG/1
30 TABLET, FILM COATED ORAL EVERY 6 HOURS PRN
Qty: 30 TABLET | Refills: 1 | Status: CANCELLED | OUTPATIENT
Start: 2024-12-19 | End: 2025-12-19

## 2024-12-19 RX ORDER — FLUTICASONE PROPIONATE 50 MCG
2 SPRAY, SUSPENSION (ML) NASAL DAILY
Qty: 16 G | Refills: 0 | Status: SHIPPED | OUTPATIENT
Start: 2024-12-19

## 2024-12-19 NOTE — PROGRESS NOTES
Lancets MISC USE TO TEST ONCE TO TWICE DAILY AND AS NEEDED FOR SYMPTOMS OF IRREGULAR BLOOD GLUCOSE      Blood Glucose Monitoring Suppl (ACURA BLOOD GLUCOSE METER) w/Device KIT 1 Device by Does not apply route 2 times daily 1 kit 0    blood glucose monitor strips Test 1 times a day & as needed for symptoms of irregular blood glucose. Dispense sufficient amount for indicated testing frequency plus additional to accommodate PRN testing needs. 100 strip 3    blood glucose monitor kit and supplies Test 2 times a day & as needed for symptoms of irregular blood glucose.  Lancets and test strips to be included 1 kit 0    aspirin 81 MG tablet Take 1 tablet by mouth daily      acetaminophen (TYLENOL) 650 MG extended release tablet Take 1 tablet by mouth every 8 hours as needed      dicyclomine (BENTYL) 20 MG tablet Take 1 tablet by mouth 4 times daily as needed      famotidine (PEPCID) 20 MG tablet Take 1 tablet by mouth 2 times daily 28 tablet 0     No current facility-administered medications for this visit.     Allergies, PMH, Surgical Hx, Family Hx, and Social Hx reviewed and updated.  Health Maintenance reviewed.    Objective    Vitals:    12/19/24 1410   BP: 118/64   Pulse: 70   Temp: 97.6 °F (36.4 °C)   TempSrc: Temporal   SpO2: 97%   Weight: 67.1 kg (148 lb)   Height: 1.524 m (5')       Physical Exam  Constitutional:       Appearance: Normal appearance.   HENT:      Head: Normocephalic.   Cardiovascular:      Rate and Rhythm: Normal rate and regular rhythm.      Heart sounds: No murmur heard.  Pulmonary:      Effort: Pulmonary effort is normal. No respiratory distress.      Breath sounds: Normal breath sounds. No wheezing.   Skin:     General: Skin is warm and dry.   Neurological:      General: No focal deficit present.      Mental Status: She is alert and oriented to person, place, and time.          No results found for this visit on 12/19/24.    Results  Laboratory Studies  Blood work showed no

## 2025-01-13 DIAGNOSIS — E11.65 TYPE 2 DIABETES MELLITUS WITH HYPERGLYCEMIA, WITHOUT LONG-TERM CURRENT USE OF INSULIN (HCC): ICD-10-CM

## 2025-01-13 RX ORDER — ORAL SEMAGLUTIDE 3 MG/1
1 TABLET ORAL DAILY
Qty: 30 TABLET | Refills: 5 | Status: SHIPPED | OUTPATIENT
Start: 2025-01-13

## 2025-01-17 DIAGNOSIS — E11.8 TYPE 2 DIABETES MELLITUS WITH COMPLICATION, WITHOUT LONG-TERM CURRENT USE OF INSULIN (HCC): ICD-10-CM

## 2025-01-17 RX ORDER — GLIPIZIDE 5 MG/1
TABLET, FILM COATED, EXTENDED RELEASE ORAL
Qty: 360 TABLET | Refills: 0 | Status: SHIPPED | OUTPATIENT
Start: 2025-01-17

## 2025-02-03 ENCOUNTER — APPOINTMENT (OUTPATIENT)
Dept: CARDIOLOGY | Facility: CLINIC | Age: 70
End: 2025-02-03
Payer: MEDICARE

## 2025-02-03 VITALS
WEIGHT: 149.4 LBS | BODY MASS INDEX: 29.33 KG/M2 | DIASTOLIC BLOOD PRESSURE: 60 MMHG | HEART RATE: 79 BPM | SYSTOLIC BLOOD PRESSURE: 104 MMHG | HEIGHT: 60 IN

## 2025-02-03 DIAGNOSIS — E78.2 MIXED HYPERLIPIDEMIA: ICD-10-CM

## 2025-02-03 DIAGNOSIS — I25.10 ATHEROSCLEROSIS OF NATIVE CORONARY ARTERY OF NATIVE HEART WITHOUT ANGINA PECTORIS: ICD-10-CM

## 2025-02-03 DIAGNOSIS — E11.9 DIABETES MELLITUS TYPE II, NON INSULIN DEPENDENT (MULTI): ICD-10-CM

## 2025-02-03 DIAGNOSIS — I10 PRIMARY HYPERTENSION: Primary | ICD-10-CM

## 2025-02-03 DIAGNOSIS — Z95.820 STATUS POST ANGIOPLASTY WITH STENT: ICD-10-CM

## 2025-02-03 PROCEDURE — 1159F MED LIST DOCD IN RCRD: CPT | Performed by: INTERNAL MEDICINE

## 2025-02-03 PROCEDURE — 3008F BODY MASS INDEX DOCD: CPT | Performed by: INTERNAL MEDICINE

## 2025-02-03 PROCEDURE — 3074F SYST BP LT 130 MM HG: CPT | Performed by: INTERNAL MEDICINE

## 2025-02-03 PROCEDURE — 3078F DIAST BP <80 MM HG: CPT | Performed by: INTERNAL MEDICINE

## 2025-02-03 PROCEDURE — 99214 OFFICE O/P EST MOD 30 MIN: CPT | Performed by: INTERNAL MEDICINE

## 2025-02-03 RX ORDER — CLOPIDOGREL BISULFATE 75 MG/1
75 TABLET ORAL DAILY
Qty: 90 TABLET | Refills: 0 | Status: SHIPPED | OUTPATIENT
Start: 2025-02-03 | End: 2025-05-04

## 2025-02-03 RX ORDER — METOPROLOL SUCCINATE 25 MG/1
25 TABLET, EXTENDED RELEASE ORAL DAILY
Qty: 90 TABLET | Refills: 3 | Status: SHIPPED | OUTPATIENT
Start: 2025-02-03

## 2025-02-03 RX ORDER — EMPAGLIFLOZIN 25 MG/1
25 TABLET, FILM COATED ORAL DAILY
COMMUNITY

## 2025-02-03 RX ORDER — NITROGLYCERIN 0.4 MG/1
0.4 TABLET SUBLINGUAL EVERY 5 MIN PRN
Qty: 90 TABLET | Refills: 5 | Status: SHIPPED | OUTPATIENT
Start: 2025-02-03

## 2025-02-03 NOTE — PATIENT INSTRUCTIONS
Follow up 1 year  Fasting labs to be done in 1 -2 weeks and also approximately 1 week prior to next appointment. We are a paperless office.  The order is in the computer and you can go to any  lab to have done. You can always ask for order to be printed if you'd like to go to outside lab.    DID YOU KNOW  We have a pharmacy here in the [facility].  They can fill all prescriptions, not just cardiac medications.  Prescriptions from other pharmacies can easily be transferred to the  pharmacy by the  pharmacist on site.   pharmacies offer FREE HOME DELIVERY on medications to anywhere in [place]. They can sync your medications. Typically prescriptions can be ready in 10 - 15 minutes. If pharmacy is unable to fill your  prescription or if cost is more than your paying now the Pharmacist can easily transfer back to your Pharmacy of choice. Pharmacy phone # [phone].     Please bring all medicines, vitamins, and herbal supplements with you in original bottles to every appointment!!!!    Prescriptions will not be filled unless you are compliant with your follow up appointments or have a follow up appointment scheduled as per instruction of your physician. Refills should be requested at the time of your visit.

## 2025-02-03 NOTE — PROGRESS NOTES
Patient:  Leah Myrick  YOB: 1955  MRN: [id number]       HPI:       Leah Myrick is a 69 y.o. female who returns today for cardiac follow-up.  She has a history of intermittent chest pain and palpitations. She was noted on a previous Holter monitor to have PACs and a few PVCs as well. Her symptoms improved on a combination of Cardizem CD and Toprol-XL. She has a history of essential hypertension and hyperlipidemia. Cardiac catheterization in June 2012 showed 30-40% stenosis of the right coronary artery and otherwise minimal disease. There was normal LV systolic function.  She has been maintained on Cardizem  mg daily with reasonably good control of her blood pressure and palpitations.     At the time of her last visit in March 2022 she noted intermittent episodes of chest discomfort occurring over a period of 2 weeks or so. She was having aching and pressure-like discomfort in the left upper chest. It was mostly occurring while she was exerting herself.    She was started on aspirin 81 mg daily. Toprol-XL 25 mg daily was resumed. Cardizem CD was continued and she restarted rosuvastatin. She underwent an elective left heart catheterization with Dr. Watts at [facility] April 11, 2022.  Side to see right. She was noted to have 75 to 80% tubular mid stenosis of the LAD resulting in PCI/EDMUND to the LAD. Her chest pain resolved, however, she was having some shortness of breath and nausea worsened after taking Brilinta. She was changed to Plavix and Brilinta was discontinued. An echocardiogram on April 26, 2022 was normal. Estimated LV ejection fraction was 65%. No valvular abnormalities noted. RVSP estimated at 22 mmHg. She noted some persistent palpitations. A holter monitor on June 21, 2022 showed normal sinus rhythm with average heart rate 82 bpm. Heart rates ranged from 63 to 117 bpm. Rare PACs and PVCs were noted. No significant dysrhythmias.   She underwent upper endoscopy on November 15, 2023 and  was noted to have moderate chronic active gastritis.   S     She has been doing reasonably well since her last visit.  She started taking Jardiance and her blood sugars improved significantly.  She has lost approximately 15 pounds and overall feels much better.  She denies any chest pain or shortness of breath. She denies any orthopnea, PND, or increased peripheral edema. She denies any lightheadedness, near syncope, or syncope. She denies any fever, chills, or cough. She denies any hematemesis, hemoptysis, melena, or hematochezia.  Lab studies December 18, 2024 showed a normal CBC and ESR.  Hemoglobin A1c October 10, 2024 was 9.4.  LDL cholesterol August 18, 2023 was 140.  She will be scheduled for follow-up CMP and lipid profile in the near future.  Continue Toprol-XL, Cardizem, aspirin, rosuvastatin, and pantoprazole.  She is no longer taking Imdur.  Other details as noted below.     The above portion of this note was dictated by me using voice recognition software.  I personally performed the services described in the documentation.  The scribe entering the documentation below was in my presence.  I affirm that the information is both accurate and complete.      Objective:     Vitals:    02/03/25 1528   BP: 104/60   Pulse: 79       Wt Readings from Last 4 Encounters:   02/03/25 67.8 kg (149 lb 6.4 oz)   11/10/24 65.8 kg (145 lb)   02/05/24 73.8 kg (162 lb 12.8 oz)   05/04/23 74.4 kg (164 lb 2 oz)       Allergies:     No Known Allergies       Medications:     Current Outpatient Medications   Medication Instructions    acetaminophen (Tylenol 8 HOUR) 650 mg ER tablet Take 1 tablet (650 mg) by mouth every 8 hours if needed.    aspirin 81 mg EC tablet 1 tablet, Daily    celecoxib (CeleBREX) 100 mg capsule 1 capsule, 2 times daily    clopidogrel (PLAVIX) 75 mg, oral, Daily    glipiZIDE XL (Glucotrol XL) 5 mg 24 hr tablet 1 tablet, 2 times daily    isosorbide mononitrate ER (IMDUR) 30 mg, Daily    metFORMIN XR  (GLUCOPHAGE-XR) 1,000 mg, oral, 2 times daily    metoprolol succinate XL (TOPROL-XL) 25 mg, oral, Daily    nitroglycerin (Nitrostat) 0.4 mg SL tablet PLACE 1 TABLET UNDER THE TONGUE EVERY 5 MINUTES FOR UP TO 3 DOSES AS NEEDED FOR CHEST PAIN.CALL 911 IF PAIN PERSISTS.    pantoprazole (ProtoNix) 40 mg EC tablet 1 tablet, Daily    rosuvastatin (Crestor) 5 mg tablet 1 tablet, Daily    Tiadylt  mg, oral, Daily before breakfast       Physical Examination:   GENERAL:  Well developed, well nourished, in no acute distress.  CHEST:  Symmetric and nontender.  NEURO/PSYCH:  Alert and oriented times three with approppriate behavior and responses.  NECK:  Supple, no JVD, no bruit.  LUNGS:  Clear to auscultation bilaterally, normal respiratory effort.  HEART:  Rate and rhythm regular with no evident murmur, no gallop appreciated.        There are no rubs, clicks or heaves.  EXTREMITIES:  Warm with good color, no clubbing or cyanosis.  There is no edema noted.  PERIPHERAL VASCULAR:  Pulses present and equally palpable; 2+ throughout.      Lab:     CBC:   Lab Results   Component Value Date    WBC 7.5 04/11/2022    RBC 4.97 04/11/2022    HGB 15.4 04/11/2022    HCT 45.8 04/11/2022     04/11/2022        CMP:    Lab Results   Component Value Date     04/11/2022    K 3.9 04/11/2022     04/11/2022    CO2 24 04/11/2022    BUN 14 04/11/2022    CREATININE 0.66 04/11/2022    GLUCOSE 290 (H) 04/11/2022    CALCIUM 8.7 04/11/2022       BMP:  Lab Results   Component Value Date     04/11/2022     04/11/2022     07/16/2020    K 3.9 04/11/2022    K 4.1 04/11/2022    K 3.5 07/16/2020     04/11/2022     04/11/2022     07/16/2020    CO2 24 04/11/2022    CO2 27 04/11/2022    CO2 27 07/16/2020    BUN 14 04/11/2022    BUN 12 04/11/2022    BUN 10 07/16/2020    CREATININE 0.66 04/11/2022    CREATININE 0.49 (L) 04/11/2022    CREATININE 0.49 (L) 07/16/2020       CBC:  Lab Results   Component Value  Date    WBC 7.5 04/11/2022    WBC 8.4 07/16/2020    RBC 4.97 04/11/2022    RBC 4.65 07/16/2020    HGB 15.4 04/11/2022    HGB 14.3 07/16/2020    HCT 45.8 04/11/2022    HCT 41.8 07/16/2020    MCV 92 04/11/2022    MCV 90 07/16/2020    MCHC 33.6 04/11/2022    MCHC 34.2 07/16/2020    RDW 11.8 04/11/2022    RDW 11.8 07/16/2020     04/11/2022     07/16/2020       Hepatic Function Panel:    Lab Results   Component Value Date    ALKPHOS 93 07/16/2020    ALT 16 07/16/2020    AST 16 07/16/2020    PROT 6.8 07/16/2020    BILITOT 0.5 07/16/2020    BILIDIR 0.1 07/16/2020       HgBA1c:    Lab Results   Component Value Date    HGBA1C 10.0 (H) 08/18/2023       Problem List:     Patient Active Problem List   Diagnosis    Atherosclerosis of native coronary artery of native heart without angina pectoris    Angina pectoris    Effort angina (CMS-HCC)    Internal derangement of right knee    Knee pain, right    Nausea in adult    Palpitations    Shortness of breath on exertion    Status post angioplasty with stent    Systolic murmur    Varicose veins of right lower extremity with inflammation    Primary hypertension    Mixed hyperlipidemia    Diabetes mellitus type II, non insulin dependent (Multi)       Asessment:     Problem List Items Addressed This Visit             ICD-10-CM    Atherosclerosis of native coronary artery of native heart without angina pectoris I25.10    Relevant Medications    clopidogrel (Plavix) 75 mg tablet    metoprolol succinate XL (Toprol-XL) 25 mg 24 hr tablet    nitroglycerin (Nitrostat) 0.4 mg SL tablet    Other Relevant Orders    Comprehensive Metabolic Panel    Lipid Panel    Comprehensive Metabolic Panel    Lipid Panel    Follow Up In Cardiology    Status post angioplasty with stent Z95.820    Relevant Orders    Comprehensive Metabolic Panel    Lipid Panel    Comprehensive Metabolic Panel    Lipid Panel    Follow Up In Cardiology    Primary hypertension - Primary I10    Relevant Medications     metoprolol succinate XL (Toprol-XL) 25 mg 24 hr tablet    Other Relevant Orders    Comprehensive Metabolic Panel    Lipid Panel    Comprehensive Metabolic Panel    Lipid Panel    Follow Up In Cardiology    Mixed hyperlipidemia E78.2    Relevant Orders    Comprehensive Metabolic Panel    Lipid Panel    Comprehensive Metabolic Panel    Lipid Panel    Follow Up In Cardiology    Diabetes mellitus type II, non insulin dependent (Multi) E11.9    Relevant Orders    Comprehensive Metabolic Panel    Lipid Panel    Comprehensive Metabolic Panel    Lipid Panel    Follow Up In Cardiology

## 2025-02-08 DIAGNOSIS — E78.00 HYPERCHOLESTEREMIA: ICD-10-CM

## 2025-02-09 NOTE — TELEPHONE ENCOUNTER
Past Visits    Date Provider Specialty Visit Type Primary Dx   12/19/2024 Gisselle Duval APRN - CNP Family Medicine Office Visit Fever, unspecified fever cause   12/11/2024 Gisselle Duval APRN - CNP Family Medicine Office Visit Infection of left foot   10/10/2024 Mariia Heath MD Family Medicine Office Visit Urinary tract infection without hematuria, site unspecified   07/15/2024 Olaf Gabriel MD Family Medicine Office Visit Hypercholesteremia   06/21/2024 Laura Duarte APRN - CNP Family Medicine Telemedicine Medicare annual wellness visit, subsequent

## 2025-02-10 RX ORDER — ROSUVASTATIN CALCIUM 10 MG/1
10 TABLET, COATED ORAL DAILY
Qty: 90 TABLET | Refills: 0 | Status: SHIPPED | OUTPATIENT
Start: 2025-02-10

## 2025-03-26 ENCOUNTER — TELEPHONE (OUTPATIENT)
Age: 70
End: 2025-03-26

## 2025-03-26 NOTE — TELEPHONE ENCOUNTER
----- Message from Coy BLOUNT sent at 3/26/2025  3:36 PM EDT -----  Regarding: ECC Appointment Request  ECC Appointment Request    Patient needs appointment for ECC Appointment Type: Existing Condition Follow Up.    Patient Requested Dates(s): Next week  Patient Requested Time: after 3pm  Provider Name: Olaf Gabriel MD/with other provider     Reason for Appointment Request: Established Patient - Available appointments did not meet patient need  --------------------------------------------------------------------------------------------------------------------------    Relationship to Patient: Self     Call Back Information: OK to leave message on voicemail  Preferred Call Back Number: Phone [phone]

## 2025-03-27 RX ORDER — CELECOXIB 100 MG/1
100 CAPSULE ORAL 2 TIMES DAILY
Qty: 180 CAPSULE | Refills: 0 | Status: SHIPPED | OUTPATIENT
Start: 2025-03-27

## 2025-03-27 NOTE — TELEPHONE ENCOUNTER
Left voicemail for patient to call back to the office to schedule appointment with another provider as Dr. Gabriel is out of the office.

## 2025-03-31 ENCOUNTER — OFFICE VISIT (OUTPATIENT)
Age: 70
End: 2025-03-31
Payer: MEDICARE

## 2025-03-31 VITALS
HEART RATE: 74 BPM | BODY MASS INDEX: 30.27 KG/M2 | TEMPERATURE: 97.6 F | WEIGHT: 154.2 LBS | SYSTOLIC BLOOD PRESSURE: 126 MMHG | DIASTOLIC BLOOD PRESSURE: 74 MMHG | HEIGHT: 60 IN | OXYGEN SATURATION: 96 %

## 2025-03-31 DIAGNOSIS — R53.82 CHRONIC FATIGUE: ICD-10-CM

## 2025-03-31 DIAGNOSIS — I25.10 ATHEROSCLEROSIS OF NATIVE CORONARY ARTERY OF NATIVE HEART WITHOUT ANGINA PECTORIS: ICD-10-CM

## 2025-03-31 DIAGNOSIS — R06.02 SHORTNESS OF BREATH: ICD-10-CM

## 2025-03-31 DIAGNOSIS — E11.65 TYPE 2 DIABETES MELLITUS WITH HYPERGLYCEMIA, WITHOUT LONG-TERM CURRENT USE OF INSULIN (HCC): Primary | ICD-10-CM

## 2025-03-31 LAB
ALBUMIN SERPL-MCNC: 4.3 G/DL (ref 3.5–4.6)
ALP SERPL-CCNC: 96 U/L (ref 40–130)
ALT SERPL-CCNC: 22 U/L (ref 0–33)
ANION GAP SERPL CALCULATED.3IONS-SCNC: 13 MEQ/L (ref 9–15)
AST SERPL-CCNC: 21 U/L (ref 0–35)
BASOPHILS # BLD: 0.1 K/UL (ref 0–0.2)
BASOPHILS NFR BLD: 0.9 %
BILIRUB SERPL-MCNC: 0.5 MG/DL (ref 0.2–0.7)
BUN SERPL-MCNC: 13 MG/DL (ref 8–23)
CALCIUM SERPL-MCNC: 9 MG/DL (ref 8.5–9.9)
CHLORIDE SERPL-SCNC: 100 MEQ/L (ref 95–107)
CO2 SERPL-SCNC: 24 MEQ/L (ref 20–31)
CREAT SERPL-MCNC: 0.63 MG/DL (ref 0.5–0.9)
EOSINOPHIL # BLD: 0.1 K/UL (ref 0–0.7)
EOSINOPHIL NFR BLD: 0.9 %
ERYTHROCYTE [DISTWIDTH] IN BLOOD BY AUTOMATED COUNT: 12 % (ref 11.5–14.5)
GLOBULIN SER CALC-MCNC: 2.2 G/DL (ref 2.3–3.5)
GLUCOSE SERPL-MCNC: 212 MG/DL (ref 70–99)
HBA1C MFR BLD: 9.2 %
HCT VFR BLD AUTO: 45.5 % (ref 37–47)
HGB BLD-MCNC: 15.2 G/DL (ref 12–16)
LYMPHOCYTES # BLD: 2.6 K/UL (ref 1–4.8)
LYMPHOCYTES NFR BLD: 31.5 %
MCH RBC QN AUTO: 31.5 PG (ref 27–31.3)
MCHC RBC AUTO-ENTMCNC: 33.4 % (ref 33–37)
MCV RBC AUTO: 94.2 FL (ref 79.4–94.8)
MONOCYTES # BLD: 0.6 K/UL (ref 0.2–0.8)
MONOCYTES NFR BLD: 7.3 %
NEUTROPHILS # BLD: 4.9 K/UL (ref 1.4–6.5)
NEUTS SEG NFR BLD: 59.2 %
PLATELET # BLD AUTO: 243 K/UL (ref 130–400)
POTASSIUM SERPL-SCNC: 4.5 MEQ/L (ref 3.4–4.9)
PROT SERPL-MCNC: 6.5 G/DL (ref 6.3–8)
RBC # BLD AUTO: 4.83 M/UL (ref 4.2–5.4)
SODIUM SERPL-SCNC: 137 MEQ/L (ref 135–144)
TSH REFLEX: 0.74 UIU/ML (ref 0.44–3.86)
WBC # BLD AUTO: 8.2 K/UL (ref 4.8–10.8)

## 2025-03-31 PROCEDURE — 3078F DIAST BP <80 MM HG: CPT | Performed by: FAMILY MEDICINE

## 2025-03-31 PROCEDURE — G8400 PT W/DXA NO RESULTS DOC: HCPCS | Performed by: FAMILY MEDICINE

## 2025-03-31 PROCEDURE — 3017F COLORECTAL CA SCREEN DOC REV: CPT | Performed by: FAMILY MEDICINE

## 2025-03-31 PROCEDURE — 2022F DILAT RTA XM EVC RTNOPTHY: CPT | Performed by: FAMILY MEDICINE

## 2025-03-31 PROCEDURE — 1159F MED LIST DOCD IN RCRD: CPT | Performed by: FAMILY MEDICINE

## 2025-03-31 PROCEDURE — 1036F TOBACCO NON-USER: CPT | Performed by: FAMILY MEDICINE

## 2025-03-31 PROCEDURE — 99214 OFFICE O/P EST MOD 30 MIN: CPT | Performed by: FAMILY MEDICINE

## 2025-03-31 PROCEDURE — 1123F ACP DISCUSS/DSCN MKR DOCD: CPT | Performed by: FAMILY MEDICINE

## 2025-03-31 PROCEDURE — 1160F RVW MEDS BY RX/DR IN RCRD: CPT | Performed by: FAMILY MEDICINE

## 2025-03-31 PROCEDURE — G8417 CALC BMI ABV UP PARAM F/U: HCPCS | Performed by: FAMILY MEDICINE

## 2025-03-31 PROCEDURE — 1090F PRES/ABSN URINE INCON ASSESS: CPT | Performed by: FAMILY MEDICINE

## 2025-03-31 PROCEDURE — 3074F SYST BP LT 130 MM HG: CPT | Performed by: FAMILY MEDICINE

## 2025-03-31 PROCEDURE — G8427 DOCREV CUR MEDS BY ELIG CLIN: HCPCS | Performed by: FAMILY MEDICINE

## 2025-03-31 PROCEDURE — 83036 HEMOGLOBIN GLYCOSYLATED A1C: CPT | Performed by: FAMILY MEDICINE

## 2025-03-31 PROCEDURE — 3046F HEMOGLOBIN A1C LEVEL >9.0%: CPT | Performed by: FAMILY MEDICINE

## 2025-03-31 SDOH — ECONOMIC STABILITY: FOOD INSECURITY: WITHIN THE PAST 12 MONTHS, THE FOOD YOU BOUGHT JUST DIDN'T LAST AND YOU DIDN'T HAVE MONEY TO GET MORE.: NEVER TRUE

## 2025-03-31 SDOH — ECONOMIC STABILITY: FOOD INSECURITY: WITHIN THE PAST 12 MONTHS, YOU WORRIED THAT YOUR FOOD WOULD RUN OUT BEFORE YOU GOT MONEY TO BUY MORE.: NEVER TRUE

## 2025-03-31 ASSESSMENT — PATIENT HEALTH QUESTIONNAIRE - PHQ9
SUM OF ALL RESPONSES TO PHQ QUESTIONS 1-9: 0
2. FEELING DOWN, DEPRESSED OR HOPELESS: NOT AT ALL
SUM OF ALL RESPONSES TO PHQ QUESTIONS 1-9: 0
1. LITTLE INTEREST OR PLEASURE IN DOING THINGS: NOT AT ALL

## 2025-03-31 NOTE — PROGRESS NOTES
Obdulia Grove (:  1955) is a 69 y.o. female, Established patient, here for evaluation of the following chief complaint(s):  Fatigue (Patient c/o fatigue for approx 1.5 weeks. Patient states she is getting 6 or less hours of sleep at night, waking up often during the night. Patient does not take any sleep medications. ), Dizziness (Patient c/o dizziness for 3 days. Patient denies fall, vision changes. Patient does not take medication for dizziness. ), Discuss Medications (Patient would like to discuss Ozempic or Mounjaro for diabetes. ), Diabetes (Patient takes Glipizide, Jardiance for diabetes. Patient does test glucose at home, which fluctuates. Patient had A1c last completed on 10.10.24 and was 9.4. A1c is 9.2 today in office ), and Shortness of Breath (Patient c/o SOB happening more frequently. Patient states she notices regardless of sitting or walking. )          Subjective   History of Present Illness    She denies any chest discomfort.  She does get very short of breath with exertion.  She does have a history of coronary artery disease.  She does not see her cardiologist for some time.  Past Medical History:   Diagnosis Date    Atrial fibrillation (HCC)     Congenital heart disease     Diabetes mellitus (HCC)     Hyperlipidemia     Hypertension      Past Surgical History:   Procedure Laterality Date    CARDIAC CATHETERIZATION      stent placement     SECTION      CHOLECYSTECTOMY      KIDNEY STONE SURGERY      UPPER GASTROINTESTINAL ENDOSCOPY N/A 11/15/2023    EGD DIAGNOSTIC ONLY with biopsies performed by Dave Peña MD at [facility]     Social History     Socioeconomic History    Marital status:      Spouse name: Not on file    Number of children: Not on file    Years of education: Not on file    Highest education level: Not on file   Occupational History    Not on file   Tobacco Use    Smoking status: Never    Smokeless tobacco: Never   Vaping Use    Vaping

## 2025-04-01 ENCOUNTER — RESULTS FOLLOW-UP (OUTPATIENT)
Age: 70
End: 2025-04-01

## 2025-04-09 ENCOUNTER — TELEPHONE (OUTPATIENT)
Age: 70
End: 2025-04-09

## 2025-04-09 NOTE — TELEPHONE ENCOUNTER
----- Message from Celia MARTINO sent at 4/8/2025  4:39 PM EDT -----  Regarding: ECC Escalation To Practice  ECC Escalation To Practice      Type of Escalation: Red Flag Symptom  --------------------------------------------------------------------------------------------------------------------------    Information for Provider: is really tired, dizzy, sugar is too high also has heart trouble  Patient is looking for appointment for: Symptom High blood sugar   Reasons for Message: Unable to reach practice     Additional Information:    --------------------------------------------------------------------------------------------------------------------------    Relationship to Patient: Self  Call Back Info: OK to leave message on voicemail  Preferred Call Back Number: Phone [phone]

## 2025-04-09 NOTE — TELEPHONE ENCOUNTER
Left voicemail to please call into office at [phone]  We also have a walk in clinic available

## 2025-04-16 ENCOUNTER — HOSPITAL ENCOUNTER (EMERGENCY)
Age: 70
Discharge: HOME | End: 2025-04-17
Payer: MEDICARE

## 2025-04-16 ENCOUNTER — APPOINTMENT (OUTPATIENT)
Dept: GENERAL RADIOLOGY | Age: 70
End: 2025-04-16
Payer: MEDICARE

## 2025-04-16 ENCOUNTER — APPOINTMENT (OUTPATIENT)
Dept: CT IMAGING | Age: 70
End: 2025-04-16
Payer: MEDICARE

## 2025-04-16 DIAGNOSIS — K44.9 HIATAL HERNIA: Primary | ICD-10-CM

## 2025-04-16 DIAGNOSIS — R11.0 NAUSEA: ICD-10-CM

## 2025-04-16 DIAGNOSIS — R10.13 ABDOMINAL PAIN, EPIGASTRIC: ICD-10-CM

## 2025-04-16 LAB
ALBUMIN SERPL-MCNC: 4.3 G/DL (ref 3.5–4.6)
ALP SERPL-CCNC: 89 U/L (ref 40–130)
ALT SERPL-CCNC: 24 U/L (ref 0–33)
ANION GAP SERPL CALCULATED.3IONS-SCNC: 15 MEQ/L (ref 9–15)
AST SERPL-CCNC: 28 U/L (ref 0–35)
BASOPHILS # BLD: 0.1 K/UL (ref 0–0.2)
BASOPHILS NFR BLD: 0.6 %
BILIRUB SERPL-MCNC: 0.4 MG/DL (ref 0.2–0.7)
BILIRUB UR QL STRIP: NEGATIVE
BNP BLD-MCNC: 37 PG/ML
BUN SERPL-MCNC: 13 MG/DL (ref 8–23)
CALCIUM SERPL-MCNC: 9 MG/DL (ref 8.5–9.9)
CHLORIDE SERPL-SCNC: 99 MEQ/L (ref 95–107)
CLARITY UR: CLEAR
CO2 SERPL-SCNC: 22 MEQ/L (ref 20–31)
COLOR UR: YELLOW
CREAT SERPL-MCNC: 0.38 MG/DL (ref 0.5–0.9)
EOSINOPHIL # BLD: 0 K/UL (ref 0–0.7)
EOSINOPHIL NFR BLD: 0.3 %
ERYTHROCYTE [DISTWIDTH] IN BLOOD BY AUTOMATED COUNT: 11.9 % (ref 11.5–14.5)
GLOBULIN SER CALC-MCNC: 2.5 G/DL (ref 2.3–3.5)
GLUCOSE SERPL-MCNC: 240 MG/DL (ref 70–99)
GLUCOSE UR STRIP-MCNC: >=1000 MG/DL
HCT VFR BLD AUTO: 46.9 % (ref 37–47)
HGB BLD-MCNC: 16.1 G/DL (ref 12–16)
HGB UR QL STRIP: NEGATIVE
KETONES UR STRIP-MCNC: ABNORMAL MG/DL
LACTIC ACID, SEPSIS: 1.2 MMOL/L (ref 0.5–1.9)
LEUKOCYTE ESTERASE UR QL STRIP: NEGATIVE
LIPASE SERPL-CCNC: 20 U/L (ref 12–95)
LYMPHOCYTES # BLD: 1.1 K/UL (ref 1–4.8)
LYMPHOCYTES NFR BLD: 13.5 %
MAGNESIUM SERPL-MCNC: 2 MG/DL (ref 1.7–2.4)
MCH RBC QN AUTO: 31.5 PG (ref 27–31.3)
MCHC RBC AUTO-ENTMCNC: 34.3 % (ref 33–37)
MCV RBC AUTO: 91.8 FL (ref 79.4–94.8)
MONOCYTES # BLD: 0.5 K/UL (ref 0.2–0.8)
MONOCYTES NFR BLD: 6.1 %
NEUTROPHILS # BLD: 6.2 K/UL (ref 1.4–6.5)
NEUTS SEG NFR BLD: 79.2 %
NITRITE UR QL STRIP: NEGATIVE
PH UR STRIP: 6 [PH] (ref 5–9)
PLATELET # BLD AUTO: 217 K/UL (ref 130–400)
POTASSIUM SERPL-SCNC: 3.7 MEQ/L (ref 3.4–4.9)
PROCALCITONIN SERPL IA-MCNC: 0.25 NG/ML (ref 0–0.15)
PROT SERPL-MCNC: 6.8 G/DL (ref 6.3–8)
PROT UR STRIP-MCNC: NEGATIVE MG/DL
RBC # BLD AUTO: 5.11 M/UL (ref 4.2–5.4)
SODIUM SERPL-SCNC: 136 MEQ/L (ref 135–144)
SP GR UR STRIP: 1.04 (ref 1–1.03)
TROPONIN, HIGH SENSITIVITY: 8 NG/L (ref 0–19)
URINE REFLEX TO CULTURE: ABNORMAL
UROBILINOGEN UR STRIP-ACNC: 0.2 E.U./DL
WBC # BLD AUTO: 7.8 K/UL (ref 4.8–10.8)

## 2025-04-16 PROCEDURE — 83735 ASSAY OF MAGNESIUM: CPT

## 2025-04-16 PROCEDURE — 96375 TX/PRO/DX INJ NEW DRUG ADDON: CPT

## 2025-04-16 PROCEDURE — 84484 ASSAY OF TROPONIN QUANT: CPT

## 2025-04-16 PROCEDURE — 96374 THER/PROPH/DIAG INJ IV PUSH: CPT

## 2025-04-16 PROCEDURE — 85025 COMPLETE CBC W/AUTO DIFF WBC: CPT

## 2025-04-16 PROCEDURE — 6360000002 HC RX W HCPCS

## 2025-04-16 PROCEDURE — 83605 ASSAY OF LACTIC ACID: CPT

## 2025-04-16 PROCEDURE — 6370000000 HC RX 637 (ALT 250 FOR IP)

## 2025-04-16 PROCEDURE — 83690 ASSAY OF LIPASE: CPT

## 2025-04-16 PROCEDURE — 71045 X-RAY EXAM CHEST 1 VIEW: CPT

## 2025-04-16 PROCEDURE — 83880 ASSAY OF NATRIURETIC PEPTIDE: CPT

## 2025-04-16 PROCEDURE — 93005 ELECTROCARDIOGRAM TRACING: CPT

## 2025-04-16 PROCEDURE — 80053 COMPREHEN METABOLIC PANEL: CPT

## 2025-04-16 PROCEDURE — 99285 EMERGENCY DEPT VISIT HI MDM: CPT

## 2025-04-16 PROCEDURE — 84145 PROCALCITONIN (PCT): CPT

## 2025-04-16 PROCEDURE — 2580000003 HC RX 258

## 2025-04-16 PROCEDURE — 81003 URINALYSIS AUTO W/O SCOPE: CPT

## 2025-04-16 RX ORDER — IOPAMIDOL 755 MG/ML
75 INJECTION, SOLUTION INTRAVASCULAR
Status: COMPLETED | OUTPATIENT
Start: 2025-04-16 | End: 2025-04-17

## 2025-04-16 RX ORDER — DICYCLOMINE HYDROCHLORIDE 10 MG/1
10 CAPSULE ORAL ONCE
Status: COMPLETED | OUTPATIENT
Start: 2025-04-16 | End: 2025-04-16

## 2025-04-16 RX ORDER — ONDANSETRON 2 MG/ML
4 INJECTION INTRAMUSCULAR; INTRAVENOUS ONCE
Status: COMPLETED | OUTPATIENT
Start: 2025-04-16 | End: 2025-04-16

## 2025-04-16 RX ADMIN — ONDANSETRON 4 MG: 2 INJECTION, SOLUTION INTRAMUSCULAR; INTRAVENOUS at 22:34

## 2025-04-16 RX ADMIN — DICYCLOMINE HYDROCHLORIDE 10 MG: 10 CAPSULE ORAL at 22:34

## 2025-04-16 RX ADMIN — FAMOTIDINE 20 MG: 10 INJECTION, SOLUTION INTRAVENOUS at 22:34

## 2025-04-16 ASSESSMENT — ENCOUNTER SYMPTOMS
VOMITING: 0
DIARRHEA: 0
PHOTOPHOBIA: 0
NAUSEA: 1
SHORTNESS OF BREATH: 1
ABDOMINAL PAIN: 1
COUGH: 0

## 2025-04-16 ASSESSMENT — LIFESTYLE VARIABLES
HOW MANY STANDARD DRINKS CONTAINING ALCOHOL DO YOU HAVE ON A TYPICAL DAY: PATIENT DOES NOT DRINK
HOW OFTEN DO YOU HAVE A DRINK CONTAINING ALCOHOL: NEVER

## 2025-04-16 ASSESSMENT — PAIN - FUNCTIONAL ASSESSMENT: PAIN_FUNCTIONAL_ASSESSMENT: 0-10

## 2025-04-16 ASSESSMENT — PAIN SCALES - GENERAL: PAINLEVEL_OUTOF10: 9

## 2025-04-17 ENCOUNTER — APPOINTMENT (OUTPATIENT)
Dept: CT IMAGING | Age: 70
End: 2025-04-17
Payer: MEDICARE

## 2025-04-17 VITALS
HEART RATE: 90 BPM | WEIGHT: 153.2 LBS | RESPIRATION RATE: 24 BRPM | SYSTOLIC BLOOD PRESSURE: 115 MMHG | TEMPERATURE: 99.9 F | BODY MASS INDEX: 30.08 KG/M2 | DIASTOLIC BLOOD PRESSURE: 75 MMHG | HEIGHT: 60 IN | OXYGEN SATURATION: 100 %

## 2025-04-17 LAB
EKG ATRIAL RATE: 94 BPM
EKG P AXIS: 45 DEGREES
EKG P-R INTERVAL: 148 MS
EKG Q-T INTERVAL: 346 MS
EKG QRS DURATION: 72 MS
EKG QTC CALCULATION (BAZETT): 432 MS
EKG R AXIS: -20 DEGREES
EKG T AXIS: 91 DEGREES
EKG VENTRICULAR RATE: 94 BPM
LACTIC ACID, SEPSIS: 1.1 MMOL/L (ref 0.5–1.9)
REASON FOR REJECTION: NORMAL
REJECTED TEST: NORMAL
TROPONIN, HIGH SENSITIVITY: 8 NG/L (ref 0–19)

## 2025-04-17 PROCEDURE — 87040 BLOOD CULTURE FOR BACTERIA: CPT

## 2025-04-17 PROCEDURE — 36415 COLL VENOUS BLD VENIPUNCTURE: CPT

## 2025-04-17 PROCEDURE — 6360000002 HC RX W HCPCS

## 2025-04-17 PROCEDURE — 71275 CT ANGIOGRAPHY CHEST: CPT

## 2025-04-17 PROCEDURE — 6370000000 HC RX 637 (ALT 250 FOR IP)

## 2025-04-17 PROCEDURE — 83605 ASSAY OF LACTIC ACID: CPT

## 2025-04-17 PROCEDURE — 96375 TX/PRO/DX INJ NEW DRUG ADDON: CPT

## 2025-04-17 PROCEDURE — 74177 CT ABD & PELVIS W/CONTRAST: CPT

## 2025-04-17 PROCEDURE — 6360000004 HC RX CONTRAST MEDICATION

## 2025-04-17 PROCEDURE — 84484 ASSAY OF TROPONIN QUANT: CPT

## 2025-04-17 RX ORDER — DICYCLOMINE HCL 20 MG
20 TABLET ORAL 3 TIMES DAILY PRN
Qty: 30 TABLET | Refills: 0 | Status: SHIPPED | OUTPATIENT
Start: 2025-04-17

## 2025-04-17 RX ORDER — FENTANYL CITRATE 50 UG/ML
50 INJECTION, SOLUTION INTRAMUSCULAR; INTRAVENOUS ONCE
Status: COMPLETED | OUTPATIENT
Start: 2025-04-17 | End: 2025-04-17

## 2025-04-17 RX ORDER — METOCLOPRAMIDE 10 MG/1
10 TABLET ORAL 3 TIMES DAILY PRN
Qty: 120 TABLET | Refills: 0 | Status: SHIPPED | OUTPATIENT
Start: 2025-04-17

## 2025-04-17 RX ORDER — METOCLOPRAMIDE 10 MG/1
10 TABLET ORAL ONCE
Status: COMPLETED | OUTPATIENT
Start: 2025-04-17 | End: 2025-04-17

## 2025-04-17 RX ADMIN — IOPAMIDOL 75 ML: 755 INJECTION, SOLUTION INTRAVENOUS at 00:19

## 2025-04-17 RX ADMIN — METOCLOPRAMIDE 10 MG: 10 TABLET ORAL at 02:07

## 2025-04-17 RX ADMIN — FENTANYL CITRATE 50 MCG: 50 INJECTION INTRAMUSCULAR; INTRAVENOUS at 01:19

## 2025-04-17 NOTE — ED PROVIDER NOTES
[facility] EMERGENCY DEPARTMENT  EMERGENCY DEPARTMENT ENCOUNTER      Pt Name: Obdulia Grove  MRN: [id number]  Birthdate 1955  Date of evaluation: 4/16/2025  Provider: ANYI Cunningham  10:06 PM EDT      CHIEF COMPLAINT       Chief Complaint   Patient presents with    Chest Pain     Took two nitros, chest pain went away.     Abdominal Pain     Started on ozempic         HISTORY OF PRESENT ILLNESS   (Location/Symptom, Timing/Onset, Context/Setting, Quality, Duration, Modifying Factors, Severity)  Note limiting factors.   Obdulia Grove is a 69 y.o. female who presents to the emergency department with PMHx hypertension, coronary artery disease, hyperlipidemia, CKD.  Patient presents to the ED for evaluation of chest pain, abdominal pain, nausea and decreased appetite, lightheadedness.  Patient states that the abdominal pain started 2 days ago.  Has been constant.  Located epigastrium.  States she has been nauseous.  Has not been vomiting.  Has not been eating as much though.  States she did just start Ozempic.  Patient states she is been constipated for the past 2 days as well.  No obstipation.  Patient states last time she had symptoms like this that she was told it was attributed to her hiatal hernia.  She has not had any attempt at repair of this hiatal hernia she denies any intra-abdominal surgeries.  For her chest pain, patient did take 2 nitro prior to arrival and states that her chest pain has gone away.  The chest pain is left-sided and is started at 2 PM earlier this afternoon.  This has been constant since then.  Patient denies associated shortness of breath, states she has been having intermittent episodes of shortness of breath though on her cardiologist is working her up for this.  States history of stents 2 years ago.  Patient denies any recent fever, chills, cough, congestion, rhinorrhea, sore throat.    Most recent cardiology evaluation OV 2/3/2025 appreciated.  Note last cardiac cath April 2022  Emergency Department Physician who either signs or Co-signs this chart in the absence of a cardiologist.    EKG shows normal sinus rhythm heart rate 94 QRS 72 QT/QTc 346/432 nonspecific T wave changes noted no acute ST segment changes noted reviewed by me also reviewed by ED attending    RADIOLOGY:   Non-plain film images such as CT, Ultrasound and MRI are read by the radiologist. Plain radiographic images are visualized and preliminarily interpreted by the emergency physician with the below findings:    ED course    Interpretation per the Radiologist below, if available at the time of this note:    CT ABDOMEN PELVIS W IV CONTRAST Additional Contrast? None   Final Result   1. No acute intra-abdominal or pelvic abnormality.   2. Small hiatal hernia.   3. Fluid throughout the colon may be seen in the setting of a diarrheal   illness.         CTA CHEST W WO CONTRAST   Final Result   1. No acute process.   2. Cardiomegaly.   3. Hiatal hernia.         XR CHEST PORTABLE   Final Result   Atherosclerotic disease. No additional evidence of active cardiopulmonary   pathology.               ED BEDSIDE ULTRASOUND:   Performed by ED Physician - none    LABS:  Labs Reviewed   CBC WITH AUTO DIFFERENTIAL - Abnormal; Notable for the following components:       Result Value    Hemoglobin 16.1 (*)     MCH 31.5 (*)     All other components within normal limits   COMPREHENSIVE METABOLIC PANEL - Abnormal; Notable for the following components:    Glucose 240 (*)     Creatinine 0.38 (*)     All other components within normal limits   PROCALCITONIN - Abnormal; Notable for the following components:    Procalcitonin 0.25 (*)     All other components within normal limits   URINALYSIS WITH REFLEX TO CULTURE - Abnormal; Notable for the following components:    Glucose, Ur >=1000 (*)     Ketones, Urine TRACE (*)     All other components within normal limits   CULTURE, BLOOD 1   CULTURE, BLOOD 2   LACTATE, SEPSIS   LACTATE, SEPSIS   LIPASE

## 2025-04-18 LAB — BACTERIA BLD CULT: NORMAL

## 2025-04-22 LAB — BACTERIA BLD CULT: NORMAL

## 2025-05-02 ENCOUNTER — RESULTS FOLLOW-UP (OUTPATIENT)
Age: 70
End: 2025-05-02

## 2025-05-02 RX ORDER — ERGOCALCIFEROL 1.25 MG/1
50000 CAPSULE, LIQUID FILLED ORAL WEEKLY
Qty: 12 CAPSULE | Refills: 0 | Status: SHIPPED | OUTPATIENT
Start: 2025-05-02

## 2025-05-22 DIAGNOSIS — E78.00 HYPERCHOLESTEREMIA: ICD-10-CM

## 2025-05-22 RX ORDER — ROSUVASTATIN CALCIUM 10 MG/1
10 TABLET, COATED ORAL DAILY
Qty: 90 TABLET | Refills: 0 | Status: SHIPPED | OUTPATIENT
Start: 2025-05-22

## 2025-06-23 DIAGNOSIS — E11.8 TYPE 2 DIABETES MELLITUS WITH COMPLICATION, WITHOUT LONG-TERM CURRENT USE OF INSULIN (HCC): ICD-10-CM

## 2025-06-23 RX ORDER — GLIPIZIDE 5 MG/1
TABLET, FILM COATED, EXTENDED RELEASE ORAL
Qty: 360 TABLET | Refills: 0 | Status: SHIPPED | OUTPATIENT
Start: 2025-06-23

## 2025-06-23 RX ORDER — CELECOXIB 100 MG/1
100 CAPSULE ORAL 2 TIMES DAILY
Qty: 180 CAPSULE | Refills: 0 | Status: SHIPPED | OUTPATIENT
Start: 2025-06-23

## 2025-06-23 NOTE — TELEPHONE ENCOUNTER
Rx requested:  Requested Prescriptions     Pending Prescriptions Disp Refills    glipiZIDE (GLUCOTROL XL) 5 MG extended release tablet [Pharmacy Med Name: glipiZIDE ER Oral Tablet Extended Release 24 Hour 5 MG] 360 tablet 0     Sig: TAKE TWO TABLETS BY MOUTH TWO TIMES A DAY         Last Office Visit:   03/31/25 Dr. Dee      Next Visit Date:  No future appointments.

## 2025-07-07 ENCOUNTER — APPOINTMENT (OUTPATIENT)
Dept: GENERAL RADIOLOGY | Age: 70
End: 2025-07-07
Payer: MEDICARE

## 2025-07-07 ENCOUNTER — HOSPITAL ENCOUNTER (EMERGENCY)
Age: 70
Discharge: HOME | End: 2025-07-07
Payer: MEDICARE

## 2025-07-07 ENCOUNTER — APPOINTMENT (OUTPATIENT)
Dept: CT IMAGING | Age: 70
End: 2025-07-07
Payer: MEDICARE

## 2025-07-07 VITALS
HEART RATE: 90 BPM | TEMPERATURE: 97.7 F | RESPIRATION RATE: 15 BRPM | HEIGHT: 60 IN | SYSTOLIC BLOOD PRESSURE: 155 MMHG | WEIGHT: 150 LBS | OXYGEN SATURATION: 93 % | BODY MASS INDEX: 29.45 KG/M2 | DIASTOLIC BLOOD PRESSURE: 73 MMHG

## 2025-07-07 DIAGNOSIS — R73.9 HYPERGLYCEMIA: Primary | ICD-10-CM

## 2025-07-07 DIAGNOSIS — R10.13 ABDOMINAL PAIN, EPIGASTRIC: ICD-10-CM

## 2025-07-07 LAB
ALBUMIN SERPL-MCNC: 4.3 G/DL (ref 3.5–4.6)
ALP SERPL-CCNC: 93 U/L (ref 40–130)
ALT SERPL-CCNC: 19 U/L (ref 0–33)
ANION GAP SERPL CALCULATED.3IONS-SCNC: 15 MEQ/L (ref 9–15)
AST SERPL-CCNC: 16 U/L (ref 0–35)
BASOPHILS # BLD: 0.1 K/UL (ref 0–0.2)
BASOPHILS NFR BLD: 0.4 %
BILIRUB SERPL-MCNC: 0.3 MG/DL (ref 0.2–0.7)
BUN SERPL-MCNC: 20 MG/DL (ref 8–23)
CALCIUM SERPL-MCNC: 9.3 MG/DL (ref 8.5–9.9)
CHLORIDE SERPL-SCNC: 103 MEQ/L (ref 95–107)
CO2 SERPL-SCNC: 25 MEQ/L (ref 20–31)
CREAT SERPL-MCNC: 0.51 MG/DL (ref 0.5–0.9)
EKG ATRIAL RATE: 84 BPM
EKG DIAGNOSIS: NORMAL
EKG P AXIS: 61 DEGREES
EKG P-R INTERVAL: 154 MS
EKG Q-T INTERVAL: 386 MS
EKG QRS DURATION: 80 MS
EKG QTC CALCULATION (BAZETT): 456 MS
EKG R AXIS: -16 DEGREES
EKG T AXIS: 68 DEGREES
EKG VENTRICULAR RATE: 84 BPM
EOSINOPHIL # BLD: 0.1 K/UL (ref 0–0.7)
EOSINOPHIL NFR BLD: 0.7 %
ERYTHROCYTE [DISTWIDTH] IN BLOOD BY AUTOMATED COUNT: 12.1 % (ref 11.5–14.5)
GLOBULIN SER CALC-MCNC: 2.2 G/DL (ref 2.3–3.5)
GLUCOSE SERPL-MCNC: 312 MG/DL (ref 70–99)
HCT VFR BLD AUTO: 45.2 % (ref 37–47)
HGB BLD-MCNC: 15.3 G/DL (ref 12–16)
LACTATE BLDV-SCNC: 1.5 MMOL/L (ref 0.5–2.2)
LIPASE SERPL-CCNC: 31 U/L (ref 12–95)
LYMPHOCYTES # BLD: 1.5 K/UL (ref 1–4.8)
LYMPHOCYTES NFR BLD: 9.5 %
MCH RBC QN AUTO: 31.4 PG (ref 27–31.3)
MCHC RBC AUTO-ENTMCNC: 33.8 % (ref 33–37)
MCV RBC AUTO: 92.6 FL (ref 79.4–94.8)
MONOCYTES # BLD: 0.9 K/UL (ref 0.2–0.8)
MONOCYTES NFR BLD: 5.3 %
NEUTROPHILS # BLD: 13.6 K/UL (ref 1.4–6.5)
NEUTS SEG NFR BLD: 83.8 %
PERFORMED ON: NORMAL
PLATELET # BLD AUTO: 216 K/UL (ref 130–400)
POC CREATININE: 0.6 MG/DL (ref 0.6–1.2)
POC SAMPLE TYPE: NORMAL
POTASSIUM SERPL-SCNC: 3.7 MEQ/L (ref 3.4–4.9)
PROT SERPL-MCNC: 6.5 G/DL (ref 6.3–8)
RBC # BLD AUTO: 4.88 M/UL (ref 4.2–5.4)
SODIUM SERPL-SCNC: 143 MEQ/L (ref 135–144)
TROPONIN, HIGH SENSITIVITY: 7 NG/L (ref 0–19)
TROPONIN, HIGH SENSITIVITY: <6 NG/L (ref 0–19)
WBC # BLD AUTO: 16.3 K/UL (ref 4.8–10.8)

## 2025-07-07 PROCEDURE — 93005 ELECTROCARDIOGRAM TRACING: CPT | Performed by: PERSONAL EMERGENCY RESPONSE ATTENDANT

## 2025-07-07 PROCEDURE — 83605 ASSAY OF LACTIC ACID: CPT

## 2025-07-07 PROCEDURE — 6360000002 HC RX W HCPCS: Performed by: PERSONAL EMERGENCY RESPONSE ATTENDANT

## 2025-07-07 PROCEDURE — 2580000003 HC RX 258: Performed by: PERSONAL EMERGENCY RESPONSE ATTENDANT

## 2025-07-07 PROCEDURE — 6360000004 HC RX CONTRAST MEDICATION: Performed by: PERSONAL EMERGENCY RESPONSE ATTENDANT

## 2025-07-07 PROCEDURE — 85025 COMPLETE CBC W/AUTO DIFF WBC: CPT

## 2025-07-07 PROCEDURE — 99285 EMERGENCY DEPT VISIT HI MDM: CPT

## 2025-07-07 PROCEDURE — 2500000003 HC RX 250 WO HCPCS: Performed by: PERSONAL EMERGENCY RESPONSE ATTENDANT

## 2025-07-07 PROCEDURE — 80053 COMPREHEN METABOLIC PANEL: CPT

## 2025-07-07 PROCEDURE — 83690 ASSAY OF LIPASE: CPT

## 2025-07-07 PROCEDURE — 71045 X-RAY EXAM CHEST 1 VIEW: CPT

## 2025-07-07 PROCEDURE — 96375 TX/PRO/DX INJ NEW DRUG ADDON: CPT

## 2025-07-07 PROCEDURE — 93010 ELECTROCARDIOGRAM REPORT: CPT | Performed by: INTERNAL MEDICINE

## 2025-07-07 PROCEDURE — 84484 ASSAY OF TROPONIN QUANT: CPT

## 2025-07-07 PROCEDURE — 96374 THER/PROPH/DIAG INJ IV PUSH: CPT

## 2025-07-07 PROCEDURE — 74177 CT ABD & PELVIS W/CONTRAST: CPT

## 2025-07-07 PROCEDURE — 6370000000 HC RX 637 (ALT 250 FOR IP): Performed by: PERSONAL EMERGENCY RESPONSE ATTENDANT

## 2025-07-07 RX ORDER — 0.9 % SODIUM CHLORIDE 0.9 %
500 INTRAVENOUS SOLUTION INTRAVENOUS ONCE
Status: COMPLETED | OUTPATIENT
Start: 2025-07-07 | End: 2025-07-07

## 2025-07-07 RX ORDER — MORPHINE SULFATE 4 MG/ML
4 INJECTION, SOLUTION INTRAMUSCULAR; INTRAVENOUS ONCE
Status: COMPLETED | OUTPATIENT
Start: 2025-07-07 | End: 2025-07-07

## 2025-07-07 RX ORDER — IOPAMIDOL 755 MG/ML
75 INJECTION, SOLUTION INTRAVASCULAR
Status: COMPLETED | OUTPATIENT
Start: 2025-07-07 | End: 2025-07-07

## 2025-07-07 RX ORDER — METOCLOPRAMIDE HYDROCHLORIDE 5 MG/ML
5 INJECTION INTRAMUSCULAR; INTRAVENOUS ONCE
Status: COMPLETED | OUTPATIENT
Start: 2025-07-07 | End: 2025-07-07

## 2025-07-07 RX ORDER — PANTOPRAZOLE SODIUM 40 MG/1
40 TABLET, DELAYED RELEASE ORAL DAILY
Qty: 30 TABLET | Refills: 0 | Status: SHIPPED | OUTPATIENT
Start: 2025-07-07 | End: 2026-07-02

## 2025-07-07 RX ORDER — ONDANSETRON 2 MG/ML
4 INJECTION INTRAMUSCULAR; INTRAVENOUS ONCE
Status: COMPLETED | OUTPATIENT
Start: 2025-07-07 | End: 2025-07-07

## 2025-07-07 RX ADMIN — MORPHINE SULFATE 4 MG: 4 INJECTION, SOLUTION INTRAMUSCULAR; INTRAVENOUS at 04:43

## 2025-07-07 RX ADMIN — FAMOTIDINE 20 MG: 10 INJECTION, SOLUTION INTRAVENOUS at 03:53

## 2025-07-07 RX ADMIN — METOCLOPRAMIDE 5 MG: 5 INJECTION, SOLUTION INTRAMUSCULAR; INTRAVENOUS at 05:57

## 2025-07-07 RX ADMIN — IOPAMIDOL 75 ML: 755 INJECTION, SOLUTION INTRAVENOUS at 04:53

## 2025-07-07 RX ADMIN — ONDANSETRON 4 MG: 2 INJECTION, SOLUTION INTRAMUSCULAR; INTRAVENOUS at 03:52

## 2025-07-07 RX ADMIN — LIDOCAINE HYDROCHLORIDE: 20 SOLUTION ORAL at 06:28

## 2025-07-07 RX ADMIN — SODIUM CHLORIDE 500 ML: 0.9 INJECTION, SOLUTION INTRAVENOUS at 03:51

## 2025-07-07 ASSESSMENT — ENCOUNTER SYMPTOMS
SHORTNESS OF BREATH: 0
RHINORRHEA: 0
VOMITING: 1
COLOR CHANGE: 0
DIARRHEA: 0
COUGH: 0
ABDOMINAL PAIN: 1
SORE THROAT: 0
BLOOD IN STOOL: 0
NAUSEA: 1

## 2025-07-07 ASSESSMENT — PAIN - FUNCTIONAL ASSESSMENT: PAIN_FUNCTIONAL_ASSESSMENT: 0-10

## 2025-07-07 ASSESSMENT — PAIN DESCRIPTION - LOCATION
LOCATION: ABDOMEN
LOCATION: ABDOMEN

## 2025-07-07 ASSESSMENT — PAIN SCALES - GENERAL
PAINLEVEL_OUTOF10: 9
PAINLEVEL_OUTOF10: 10

## 2025-07-07 ASSESSMENT — PAIN DESCRIPTION - PAIN TYPE: TYPE: ACUTE PAIN

## 2025-07-07 ASSESSMENT — PAIN DESCRIPTION - DESCRIPTORS: DESCRIPTORS: ACHING

## 2025-07-07 NOTE — DISCHARGE INSTR - COC
R10.13       Isolation/Infection:   Isolation            No Isolation          Patient Infection Status    None to display              Nurse Assessment:  Last Vital Signs: BP (!) 155/73   Pulse 90   Temp 97.7 °F (36.5 °C) (Oral)   Resp 15   Ht 1.524 m (5')   Wt 68 kg (150 lb)   SpO2 93%   BMI 29.29 kg/m²     Last documented pain score (0-10 scale): Pain Level: 9  Last Weight:   Wt Readings from Last 1 Encounters:   07/07/25 68 kg (150 lb)     Mental Status:  {IP PT MENTAL STATUS:20030}    IV Access:  { LETA IV ACCESS:639707440}    Nursing Mobility/ADLs:  Walking   {CHP DME ADLs:159347510}  Transfer  {CHP DME ADLs:741440797}  Bathing  {CHP DME ADLs:366850459}  Dressing  {CHP DME ADLs:617341004}  Toileting  {CHP DME ADLs:719157958}  Feeding  {CHP DME ADLs:651435351}  Med Admin  {P DME ADLs:846358363}  Med Delivery   { LETA MED Delivery:690918616}    Wound Care Documentation and Therapy:        Elimination:  Continence:   Bowel: {YES / NO:19727}  Bladder: {YES / NO:19727}  Urinary Catheter: {Urinary Catheter:898481104}   Colostomy/Ileostomy/Ileal Conduit: {YES / NO:19727}       Date of Last BM: ***  No intake or output data in the 24 hours ending 07/07/25 0626  No intake/output data recorded.    Safety Concerns:     { LETA Safety Concerns:707834820}    Impairments/Disabilities:      { LETA Impairments/Disabilities:211648672}    Nutrition Therapy:  Current Nutrition Therapy:   { LETA Diet List:359947133}    Routes of Feeding: {P DME Other Feedings:518702467}  Liquids: {Slp liquid thickness:63600}  Daily Fluid Restriction: {CHP DME Yes amt example:959074771}  Last Modified Barium Swallow with Video (Video Swallowing Test): {Done Not Done Date:304088012}    Treatments at the Time of Hospital Discharge:   Respiratory Treatments: ***  Oxygen Therapy:  {Therapy; copd oxygen:85098}  Ventilator:    { CC Vent List:655180451}    Rehab Therapies: {THERAPEUTIC INTERVENTION:4439538056}  Weight Bearing

## 2025-07-07 NOTE — ED PROVIDER NOTES
[facility] EMERGENCY DEPARTMENT  eMERGENCY dEPARTMENT eNCOUnter      Pt Name: Obdulia Grove  MRN: [id number]  Birthdate 1955  Date of evaluation: 2025  Provider: ANYI GREEN  3:31 AM EDT     My attending is Dr. Live.    HISTORY OF PRESENT ILLNESS    Obdulia Grove is a 69 y.o. female with PMHx of Afib, DM2, hyperlipidemia, hypertension presents to the emergency department with abdominal pain.  Around 9 PM patient started with upper abdominal pain and around 1 AM started with nausea and vomiting x 5.  She had lasagna for dinner and has been out of her \"stomach medication\" for 3 weeks that she takes PRN for this type of pain.  Last BM was yesterday morning which was normal.  She denies fevers, chest pain, shortness of breath, diarrhea, constipation, urinary symptoms.  No ill contacts.  Patient on aspirin & Plavix.       HPI    Nursing Notes were reviewed.    REVIEW OF SYSTEMS       Review of Systems   Constitutional:  Negative for appetite change, chills and fever.   HENT:  Negative for congestion, rhinorrhea and sore throat.    Respiratory:  Negative for cough and shortness of breath.    Cardiovascular:  Negative for chest pain.   Gastrointestinal:  Positive for abdominal pain, nausea and vomiting. Negative for blood in stool and diarrhea.   Genitourinary:  Negative for difficulty urinating.   Musculoskeletal:  Negative for neck stiffness.   Skin:  Negative for color change and rash.   Neurological:  Negative for dizziness, syncope, weakness, light-headedness, numbness and headaches.   All other systems reviewed and are negative.            PAST MEDICAL HISTORY     Past Medical History:   Diagnosis Date    Atrial fibrillation (HCC)     Congenital heart disease     DM2 (diabetes mellitus, type 2) (HCC)     Hyperlipidemia     Hypertension     Systolic murmur          SURGICAL HISTORY       Past Surgical History:   Procedure Laterality Date    CARDIAC CATHETERIZATION      stent placement

## 2025-07-07 NOTE — ED TRIAGE NOTES
Patient arrived to ER by private vehicle, drove self   Patient c/o N/V abdominal pain.   Symptoms started last night at 9pm

## 2025-07-21 ENCOUNTER — OFFICE VISIT (OUTPATIENT)
Age: 70
End: 2025-07-21
Payer: MEDICARE

## 2025-07-21 VITALS
OXYGEN SATURATION: 97 % | RESPIRATION RATE: 21 BRPM | BODY MASS INDEX: 29.57 KG/M2 | HEIGHT: 60 IN | SYSTOLIC BLOOD PRESSURE: 128 MMHG | TEMPERATURE: 97.6 F | WEIGHT: 150.6 LBS | DIASTOLIC BLOOD PRESSURE: 60 MMHG | HEART RATE: 67 BPM

## 2025-07-21 DIAGNOSIS — B35.1 ONYCHOMYCOSIS: ICD-10-CM

## 2025-07-21 DIAGNOSIS — E11.65 TYPE 2 DIABETES MELLITUS WITH HYPERGLYCEMIA, WITH LONG-TERM CURRENT USE OF INSULIN (HCC): Primary | ICD-10-CM

## 2025-07-21 DIAGNOSIS — K44.9 HIATAL HERNIA: ICD-10-CM

## 2025-07-21 DIAGNOSIS — M79.674 GREAT TOE PAIN, RIGHT: ICD-10-CM

## 2025-07-21 DIAGNOSIS — Z79.4 TYPE 2 DIABETES MELLITUS WITH HYPERGLYCEMIA, WITH LONG-TERM CURRENT USE OF INSULIN (HCC): Primary | ICD-10-CM

## 2025-07-21 PROBLEM — E11.8 TYPE 2 DIABETES MELLITUS WITH COMPLICATION, WITHOUT LONG-TERM CURRENT USE OF INSULIN (HCC): Status: ACTIVE | Noted: 2025-07-21

## 2025-07-21 LAB — HBA1C MFR BLD: 10.7 %

## 2025-07-21 PROCEDURE — 1090F PRES/ABSN URINE INCON ASSESS: CPT | Performed by: STUDENT IN AN ORGANIZED HEALTH CARE EDUCATION/TRAINING PROGRAM

## 2025-07-21 PROCEDURE — 1036F TOBACCO NON-USER: CPT | Performed by: STUDENT IN AN ORGANIZED HEALTH CARE EDUCATION/TRAINING PROGRAM

## 2025-07-21 PROCEDURE — 3017F COLORECTAL CA SCREEN DOC REV: CPT | Performed by: STUDENT IN AN ORGANIZED HEALTH CARE EDUCATION/TRAINING PROGRAM

## 2025-07-21 PROCEDURE — 3078F DIAST BP <80 MM HG: CPT | Performed by: STUDENT IN AN ORGANIZED HEALTH CARE EDUCATION/TRAINING PROGRAM

## 2025-07-21 PROCEDURE — 2022F DILAT RTA XM EVC RTNOPTHY: CPT | Performed by: STUDENT IN AN ORGANIZED HEALTH CARE EDUCATION/TRAINING PROGRAM

## 2025-07-21 PROCEDURE — 1159F MED LIST DOCD IN RCRD: CPT | Performed by: STUDENT IN AN ORGANIZED HEALTH CARE EDUCATION/TRAINING PROGRAM

## 2025-07-21 PROCEDURE — 3074F SYST BP LT 130 MM HG: CPT | Performed by: STUDENT IN AN ORGANIZED HEALTH CARE EDUCATION/TRAINING PROGRAM

## 2025-07-21 PROCEDURE — 3046F HEMOGLOBIN A1C LEVEL >9.0%: CPT | Performed by: STUDENT IN AN ORGANIZED HEALTH CARE EDUCATION/TRAINING PROGRAM

## 2025-07-21 PROCEDURE — 1160F RVW MEDS BY RX/DR IN RCRD: CPT | Performed by: STUDENT IN AN ORGANIZED HEALTH CARE EDUCATION/TRAINING PROGRAM

## 2025-07-21 PROCEDURE — G2211 COMPLEX E/M VISIT ADD ON: HCPCS | Performed by: STUDENT IN AN ORGANIZED HEALTH CARE EDUCATION/TRAINING PROGRAM

## 2025-07-21 PROCEDURE — G8427 DOCREV CUR MEDS BY ELIG CLIN: HCPCS | Performed by: STUDENT IN AN ORGANIZED HEALTH CARE EDUCATION/TRAINING PROGRAM

## 2025-07-21 PROCEDURE — 99214 OFFICE O/P EST MOD 30 MIN: CPT | Performed by: STUDENT IN AN ORGANIZED HEALTH CARE EDUCATION/TRAINING PROGRAM

## 2025-07-21 PROCEDURE — 83036 HEMOGLOBIN GLYCOSYLATED A1C: CPT | Performed by: STUDENT IN AN ORGANIZED HEALTH CARE EDUCATION/TRAINING PROGRAM

## 2025-07-21 PROCEDURE — 1123F ACP DISCUSS/DSCN MKR DOCD: CPT | Performed by: STUDENT IN AN ORGANIZED HEALTH CARE EDUCATION/TRAINING PROGRAM

## 2025-07-21 PROCEDURE — G8417 CALC BMI ABV UP PARAM F/U: HCPCS | Performed by: STUDENT IN AN ORGANIZED HEALTH CARE EDUCATION/TRAINING PROGRAM

## 2025-07-21 PROCEDURE — G8400 PT W/DXA NO RESULTS DOC: HCPCS | Performed by: STUDENT IN AN ORGANIZED HEALTH CARE EDUCATION/TRAINING PROGRAM

## 2025-07-21 RX ORDER — ORAL SEMAGLUTIDE 3 MG/1
1 TABLET ORAL DAILY
COMMUNITY
Start: 2025-06-30 | End: 2025-07-21

## 2025-07-21 RX ORDER — EMPAGLIFLOZIN 25 MG/1
25 TABLET, FILM COATED ORAL DAILY
Qty: 90 TABLET | Refills: 0 | Status: ACTIVE | OUTPATIENT
Start: 2025-07-21

## 2025-07-21 RX ORDER — LEVOFLOXACIN 750 MG/1
750 TABLET, FILM COATED ORAL DAILY
Qty: 14 TABLET | Refills: 0 | Status: CANCELLED | OUTPATIENT
Start: 2025-07-21 | End: 2025-08-04

## 2025-07-21 RX ORDER — TERBINAFINE HYDROCHLORIDE 250 MG/1
250 TABLET ORAL DAILY
Qty: 84 TABLET | Refills: 0 | Status: SHIPPED | OUTPATIENT
Start: 2025-07-21 | End: 2025-10-13

## 2025-07-21 ASSESSMENT — ENCOUNTER SYMPTOMS: NAUSEA: 1

## 2025-07-21 NOTE — PROGRESS NOTES
Subjective  Obdulia Grove, 69 y.o. female presents today with:  Chief Complaint   Patient presents with    Toe Pain     Patient present today with her right foot,  big toe is black and green and middle toe is in pain  for the past week in a half. She tried tylenol with little relief. Patient is requesting a POCT A1c today.     DM II  Patient is on Jardiance 25 mg daily, glipizide 10 mg BID, and Rybelsus 3 mg daily. Not on Ozempic.  Doing well with medications, no side effects.  Denies any episodes of hypoglycemia including tremors and diaphoresis.  Adhering to low-carb diet.    History of Present Illness  The patient presents for evaluation of a black spot on her toenail, diabetes mellitus, and hiatal hernia.    She reports a black spot on her toenail, which she noticed during a pedicure. The  recommended medical attention due to the severity of the condition. She experiences constant pain in the toe but does not experience any pain while walking. She recalls a previous infection in the same area that caused difficulty in walking. She is uncertain if the current issue is related to neuropathy. Additionally, she mentions having thick and yellow toenails.    She sought emergency care for severe stomach pain and vomiting, which lasted from 9:00 PM to 3:00 AM. She was diagnosed with a small hiatal hernia in her esophagus. The symptoms have been ongoing for two months, with the most recent episode being the most severe. Her symptoms are currently managed with medication, but she plans to seek further medical attention if another episode occurs.    Review of Systems   Gastrointestinal:  Positive for nausea.   Musculoskeletal:         R 3rd toe pain       Past Medical History:   Diagnosis Date    Atrial fibrillation (HCC)     Congenital heart disease     DM2 (diabetes mellitus, type 2) (HCC)     Hyperlipidemia     Hypertension     Systolic murmur      Past Surgical History:   Procedure Laterality Date    CARDIAC

## 2025-07-28 ENCOUNTER — TELEPHONE (OUTPATIENT)
Age: 70
End: 2025-07-28

## 2025-07-28 NOTE — TELEPHONE ENCOUNTER
Patient called requesting the xray results from 7/23. I told her they are taking between 5-7 days.

## 2025-08-03 DIAGNOSIS — E11.9 DIABETES MELLITUS TYPE II, NON INSULIN DEPENDENT (MULTI): ICD-10-CM

## 2025-08-03 DIAGNOSIS — E78.2 MIXED HYPERLIPIDEMIA: ICD-10-CM

## 2025-08-03 DIAGNOSIS — Z95.820 STATUS POST ANGIOPLASTY WITH STENT: ICD-10-CM

## 2025-08-03 DIAGNOSIS — I10 PRIMARY HYPERTENSION: ICD-10-CM

## 2025-08-03 DIAGNOSIS — I25.10 ATHEROSCLEROSIS OF NATIVE CORONARY ARTERY OF NATIVE HEART WITHOUT ANGINA PECTORIS: ICD-10-CM

## 2025-08-18 RX ORDER — PANTOPRAZOLE SODIUM 40 MG/1
40 TABLET, DELAYED RELEASE ORAL DAILY
Qty: 90 TABLET | Refills: 0 | Status: SHIPPED | OUTPATIENT
Start: 2025-08-18 | End: 2026-08-13

## 2025-08-18 RX ORDER — ERGOCALCIFEROL 1.25 MG/1
50000 CAPSULE, LIQUID FILLED ORAL WEEKLY
Qty: 12 CAPSULE | Refills: 0 | Status: SHIPPED | OUTPATIENT
Start: 2025-08-18

## 2025-08-24 DIAGNOSIS — E78.00 HYPERCHOLESTEREMIA: ICD-10-CM

## 2025-08-25 RX ORDER — ROSUVASTATIN CALCIUM 10 MG/1
10 TABLET, COATED ORAL DAILY
Qty: 90 TABLET | Refills: 0 | Status: SHIPPED | OUTPATIENT
Start: 2025-08-25

## 2025-08-26 DIAGNOSIS — E11.65 TYPE 2 DIABETES MELLITUS WITH HYPERGLYCEMIA, WITH LONG-TERM CURRENT USE OF INSULIN (HCC): ICD-10-CM

## 2025-08-26 DIAGNOSIS — Z79.4 TYPE 2 DIABETES MELLITUS WITH HYPERGLYCEMIA, WITH LONG-TERM CURRENT USE OF INSULIN (HCC): ICD-10-CM

## 2025-08-27 RX ORDER — ORAL SEMAGLUTIDE 7 MG/1
1 TABLET ORAL DAILY
Qty: 30 TABLET | Refills: 5 | Status: ACTIVE | OUTPATIENT
Start: 2025-08-27

## 2026-02-06 ENCOUNTER — APPOINTMENT (OUTPATIENT)
Dept: CARDIOLOGY | Facility: CLINIC | Age: 71
End: 2026-02-06
Payer: MEDICARE

## (undated) DEVICE — Device: Brand: ENDO SMARTCAP

## (undated) DEVICE — TUBING, SUCTION, 1/4" X 10', STRAIGHT: Brand: MEDLINE

## (undated) DEVICE — SINGLE PORT MANIFOLD: Brand: NEPTUNE 2

## (undated) DEVICE — TUBE SET 96 MM 64 MM H2O PERISTALTIC STD AUX CHANNEL

## (undated) DEVICE — CONMED SCOPE SAVER BITE BLOCK, 20X27 MM: Brand: SCOPE SAVER

## (undated) DEVICE — BRUSH ENDO CLN L90.5IN SHTH DIA1.7MM BRIST DIA5-7MM 2-6MM

## (undated) DEVICE — ENDO CARRY-ON PROCEDURE KIT: Brand: ENDO CARRY-ON PROCEDURE KIT